# Patient Record
Sex: FEMALE | Race: WHITE | NOT HISPANIC OR LATINO | URBAN - METROPOLITAN AREA
[De-identification: names, ages, dates, MRNs, and addresses within clinical notes are randomized per-mention and may not be internally consistent; named-entity substitution may affect disease eponyms.]

---

## 2023-02-23 ENCOUNTER — APPOINTMENT (OUTPATIENT)
Dept: CT IMAGING | Facility: HOSPITAL | Age: 78
DRG: 884 | End: 2023-02-23
Payer: MEDICARE

## 2023-02-23 ENCOUNTER — HOSPITAL ENCOUNTER (INPATIENT)
Facility: HOSPITAL | Age: 78
LOS: 22 days | Discharge: NURSING, REHAB OR LONG-TERM CARE | DRG: 884 | End: 2023-03-17
Attending: INTERNAL MEDICINE | Admitting: INTERNAL MEDICINE
Payer: MEDICARE

## 2023-02-23 ENCOUNTER — APPOINTMENT (OUTPATIENT)
Dept: GENERAL RADIOLOGY | Facility: HOSPITAL | Age: 78
DRG: 884 | End: 2023-02-23
Payer: MEDICARE

## 2023-02-23 DIAGNOSIS — R41.841 COGNITIVE COMMUNICATION DEFICIT: Primary | ICD-10-CM

## 2023-02-23 DIAGNOSIS — S06.5XAA SUBDURAL HEMATOMA: ICD-10-CM

## 2023-02-23 DIAGNOSIS — R29.6 FREQUENT FALLS: ICD-10-CM

## 2023-02-23 DIAGNOSIS — I63.9 ACUTE ISCHEMIC STROKE: ICD-10-CM

## 2023-02-23 DIAGNOSIS — S62.102A CLOSED FRACTURE OF LEFT WRIST, INITIAL ENCOUNTER: ICD-10-CM

## 2023-02-23 DIAGNOSIS — G93.40 ENCEPHALOPATHY ACUTE: ICD-10-CM

## 2023-02-23 PROBLEM — C85.90 LYMPHOMA: Chronic | Status: ACTIVE | Noted: 2023-02-23

## 2023-02-23 PROBLEM — E03.9 HYPOTHYROIDISM: Status: ACTIVE | Noted: 2023-02-23

## 2023-02-23 PROBLEM — I10 HYPERTENSION: Chronic | Status: ACTIVE | Noted: 2023-02-23

## 2023-02-23 PROBLEM — F41.9 ANXIETY AND DEPRESSION: Status: ACTIVE | Noted: 2023-02-23

## 2023-02-23 PROBLEM — J44.9 COPD (CHRONIC OBSTRUCTIVE PULMONARY DISEASE): Status: ACTIVE | Noted: 2023-02-23

## 2023-02-23 PROBLEM — F19.90 ILLICIT DRUG USE: Status: ACTIVE | Noted: 2023-02-23

## 2023-02-23 PROBLEM — F41.9 ANXIETY AND DEPRESSION: Chronic | Status: ACTIVE | Noted: 2023-02-23

## 2023-02-23 PROBLEM — J44.9 COPD (CHRONIC OBSTRUCTIVE PULMONARY DISEASE): Chronic | Status: ACTIVE | Noted: 2023-02-23

## 2023-02-23 PROBLEM — F32.A ANXIETY AND DEPRESSION: Status: ACTIVE | Noted: 2023-02-23

## 2023-02-23 PROBLEM — M19.90 ARTHRITIS: Status: ACTIVE | Noted: 2023-02-23

## 2023-02-23 PROBLEM — C85.90 LYMPHOMA: Status: ACTIVE | Noted: 2023-02-23

## 2023-02-23 PROBLEM — M19.90 ARTHRITIS: Chronic | Status: ACTIVE | Noted: 2023-02-23

## 2023-02-23 PROBLEM — I10 HYPERTENSION: Status: ACTIVE | Noted: 2023-02-23

## 2023-02-23 PROBLEM — E03.9 HYPOTHYROIDISM: Chronic | Status: ACTIVE | Noted: 2023-02-23

## 2023-02-23 PROBLEM — F32.A ANXIETY AND DEPRESSION: Chronic | Status: ACTIVE | Noted: 2023-02-23

## 2023-02-23 LAB
ALBUMIN SERPL-MCNC: 4.2 G/DL (ref 3.5–5.2)
ALBUMIN/GLOB SERPL: 1.4 G/DL
ALP SERPL-CCNC: 70 U/L (ref 39–117)
ALT SERPL W P-5'-P-CCNC: 18 U/L (ref 1–33)
AMMONIA BLD-SCNC: 14 UMOL/L (ref 11–51)
AMPHET+METHAMPHET UR QL: NEGATIVE
AMPHETAMINES UR QL: NEGATIVE
ANION GAP SERPL CALCULATED.3IONS-SCNC: 11 MMOL/L (ref 5–15)
AST SERPL-CCNC: 23 U/L (ref 1–32)
BARBITURATES UR QL SCN: NEGATIVE
BENZODIAZ UR QL SCN: POSITIVE
BILIRUB SERPL-MCNC: 0.3 MG/DL (ref 0–1.2)
BUN SERPL-MCNC: 6 MG/DL (ref 8–23)
BUN/CREAT SERPL: 9.8 (ref 7–25)
BUPRENORPHINE SERPL-MCNC: NEGATIVE NG/ML
CALCIUM SPEC-SCNC: 9 MG/DL (ref 8.6–10.5)
CANNABINOIDS SERPL QL: NEGATIVE
CHLORIDE SERPL-SCNC: 101 MMOL/L (ref 98–107)
CO2 SERPL-SCNC: 26 MMOL/L (ref 22–29)
COCAINE UR QL: NEGATIVE
CREAT SERPL-MCNC: 0.61 MG/DL (ref 0.57–1)
D-LACTATE SERPL-SCNC: 2 MMOL/L (ref 0.5–2)
DEPRECATED RDW RBC AUTO: 47.5 FL (ref 37–54)
EGFRCR SERPLBLD CKD-EPI 2021: 93.4 ML/MIN/1.73
ERYTHROCYTE [DISTWIDTH] IN BLOOD BY AUTOMATED COUNT: 13.9 % (ref 12.3–15.4)
ETHANOL BLD-MCNC: <10 MG/DL (ref 0–10)
GLOBULIN UR ELPH-MCNC: 3 GM/DL
GLUCOSE BLDC GLUCOMTR-MCNC: 108 MG/DL (ref 70–130)
GLUCOSE BLDC GLUCOMTR-MCNC: 141 MG/DL (ref 70–130)
GLUCOSE SERPL-MCNC: 126 MG/DL (ref 65–99)
HCT VFR BLD AUTO: 37.8 % (ref 34–46.6)
HGB BLD-MCNC: 12.5 G/DL (ref 12–15.9)
MCH RBC QN AUTO: 30.7 PG (ref 26.6–33)
MCHC RBC AUTO-ENTMCNC: 33.1 G/DL (ref 31.5–35.7)
MCV RBC AUTO: 92.9 FL (ref 79–97)
METHADONE UR QL SCN: NEGATIVE
OPIATES UR QL: NEGATIVE
OXYCODONE UR QL SCN: POSITIVE
PCP UR QL SCN: NEGATIVE
PLATELET # BLD AUTO: 263 10*3/MM3 (ref 140–450)
PMV BLD AUTO: 10.1 FL (ref 6–12)
POTASSIUM SERPL-SCNC: 4.4 MMOL/L (ref 3.5–5.2)
PROCALCITONIN SERPL-MCNC: 0.02 NG/ML (ref 0–0.25)
PROPOXYPH UR QL: NEGATIVE
PROT SERPL-MCNC: 7.2 G/DL (ref 6–8.5)
RBC # BLD AUTO: 4.07 10*6/MM3 (ref 3.77–5.28)
SODIUM SERPL-SCNC: 138 MMOL/L (ref 136–145)
T4 FREE SERPL-MCNC: 1.19 NG/DL (ref 0.93–1.7)
TRICYCLICS UR QL SCN: POSITIVE
TSH SERPL DL<=0.05 MIU/L-ACNC: 2.57 UIU/ML (ref 0.27–4.2)
WBC NRBC COR # BLD: 11.18 10*3/MM3 (ref 3.4–10.8)

## 2023-02-23 PROCEDURE — 4A03X5D MEASUREMENT OF ARTERIAL FLOW, INTRACRANIAL, EXTERNAL APPROACH: ICD-10-PCS | Performed by: RADIOLOGY

## 2023-02-23 PROCEDURE — 25010000002 LORAZEPAM PER 2 MG: Performed by: INTERNAL MEDICINE

## 2023-02-23 PROCEDURE — 84145 PROCALCITONIN (PCT): CPT | Performed by: NURSE PRACTITIONER

## 2023-02-23 PROCEDURE — 84439 ASSAY OF FREE THYROXINE: CPT | Performed by: NURSE PRACTITIONER

## 2023-02-23 PROCEDURE — 70498 CT ANGIOGRAPHY NECK: CPT

## 2023-02-23 PROCEDURE — 99222 1ST HOSP IP/OBS MODERATE 55: CPT | Performed by: NURSE PRACTITIONER

## 2023-02-23 PROCEDURE — 0042T HC CT CEREBRAL PERFUSION W/WO CONTRAST: CPT

## 2023-02-23 PROCEDURE — 81003 URINALYSIS AUTO W/O SCOPE: CPT | Performed by: NURSE PRACTITIONER

## 2023-02-23 PROCEDURE — 71045 X-RAY EXAM CHEST 1 VIEW: CPT

## 2023-02-23 PROCEDURE — 70450 CT HEAD/BRAIN W/O DYE: CPT

## 2023-02-23 PROCEDURE — 25010000002 ACYCLOVIR PER 5 MG: Performed by: NURSE PRACTITIONER

## 2023-02-23 PROCEDURE — 82140 ASSAY OF AMMONIA: CPT | Performed by: NURSE PRACTITIONER

## 2023-02-23 PROCEDURE — 80053 COMPREHEN METABOLIC PANEL: CPT | Performed by: NURSE PRACTITIONER

## 2023-02-23 PROCEDURE — 87040 BLOOD CULTURE FOR BACTERIA: CPT | Performed by: NURSE PRACTITIONER

## 2023-02-23 PROCEDURE — 99291 CRITICAL CARE FIRST HOUR: CPT | Performed by: INTERNAL MEDICINE

## 2023-02-23 PROCEDURE — 73110 X-RAY EXAM OF WRIST: CPT

## 2023-02-23 PROCEDURE — 25010000002 ZIPRASIDONE MESYLATE PER 10 MG: Performed by: NURSE PRACTITIONER

## 2023-02-23 PROCEDURE — 70496 CT ANGIOGRAPHY HEAD: CPT

## 2023-02-23 PROCEDURE — 82077 ASSAY SPEC XCP UR&BREATH IA: CPT | Performed by: NURSE PRACTITIONER

## 2023-02-23 PROCEDURE — 85027 COMPLETE CBC AUTOMATED: CPT | Performed by: NURSE PRACTITIONER

## 2023-02-23 PROCEDURE — 84443 ASSAY THYROID STIM HORMONE: CPT | Performed by: NURSE PRACTITIONER

## 2023-02-23 PROCEDURE — 82962 GLUCOSE BLOOD TEST: CPT

## 2023-02-23 PROCEDURE — 83605 ASSAY OF LACTIC ACID: CPT | Performed by: NURSE PRACTITIONER

## 2023-02-23 PROCEDURE — 25010000002 AMPICILLIN PER 500 MG: Performed by: NURSE PRACTITIONER

## 2023-02-23 PROCEDURE — 80306 DRUG TEST PRSMV INSTRMNT: CPT | Performed by: NURSE PRACTITIONER

## 2023-02-23 PROCEDURE — 25010000002 VANCOMYCIN 10 G RECONSTITUTED SOLUTION: Performed by: NURSE PRACTITIONER

## 2023-02-23 PROCEDURE — 25010000002 DEXAMETHASONE SODIUM PHOSPHATE 100 MG/10ML SOLUTION: Performed by: NURSE PRACTITIONER

## 2023-02-23 PROCEDURE — 25510000001 IOPAMIDOL PER 1 ML: Performed by: INTERNAL MEDICINE

## 2023-02-23 PROCEDURE — 25010000002 HALOPERIDOL LACTATE PER 5 MG

## 2023-02-23 PROCEDURE — 25010000002 CEFTRIAXONE PER 250 MG: Performed by: NURSE PRACTITIONER

## 2023-02-23 PROCEDURE — 25010000002 DIAZEPAM PER 5 MG: Performed by: INTERNAL MEDICINE

## 2023-02-23 PROCEDURE — 25010000002 THIAMINE PER 100 MG: Performed by: INTERNAL MEDICINE

## 2023-02-23 PROCEDURE — 25010000002 DIAZEPAM PER 5 MG: Performed by: NURSE PRACTITIONER

## 2023-02-23 RX ORDER — HALOPERIDOL 5 MG/ML
2 INJECTION INTRAMUSCULAR ONCE
Status: COMPLETED | OUTPATIENT
Start: 2023-02-23 | End: 2023-02-23

## 2023-02-23 RX ORDER — SODIUM CHLORIDE 0.9 % (FLUSH) 0.9 %
10 SYRINGE (ML) INJECTION EVERY 12 HOURS SCHEDULED
Status: DISCONTINUED | OUTPATIENT
Start: 2023-02-23 | End: 2023-02-25

## 2023-02-23 RX ORDER — LORAZEPAM 2 MG/ML
2 INJECTION INTRAMUSCULAR
Status: DISCONTINUED | OUTPATIENT
Start: 2023-02-23 | End: 2023-02-28

## 2023-02-23 RX ORDER — WATER 1000 ML/1000ML
INJECTION, SOLUTION INTRAVENOUS
Status: COMPLETED
Start: 2023-02-23 | End: 2023-02-23

## 2023-02-23 RX ORDER — DIAZEPAM 5 MG/ML
10 INJECTION, SOLUTION INTRAMUSCULAR; INTRAVENOUS ONCE
Status: COMPLETED | OUTPATIENT
Start: 2023-02-23 | End: 2023-02-23

## 2023-02-23 RX ORDER — DIAZEPAM 5 MG/ML
5 INJECTION, SOLUTION INTRAMUSCULAR; INTRAVENOUS ONCE
Status: COMPLETED | OUTPATIENT
Start: 2023-02-23 | End: 2023-02-23

## 2023-02-23 RX ORDER — LORAZEPAM 2 MG/ML
1 INJECTION INTRAMUSCULAR
Status: DISCONTINUED | OUTPATIENT
Start: 2023-02-23 | End: 2023-02-28

## 2023-02-23 RX ORDER — LEVOTHYROXINE SODIUM 0.1 MG/1
100 TABLET ORAL
Status: DISCONTINUED | OUTPATIENT
Start: 2023-02-24 | End: 2023-03-17 | Stop reason: HOSPADM

## 2023-02-23 RX ORDER — ASPIRIN 300 MG/1
300 SUPPOSITORY RECTAL DAILY
Status: DISCONTINUED | OUTPATIENT
Start: 2023-02-23 | End: 2023-03-17 | Stop reason: HOSPADM

## 2023-02-23 RX ORDER — ATORVASTATIN CALCIUM 40 MG/1
80 TABLET, FILM COATED ORAL NIGHTLY
Status: DISCONTINUED | OUTPATIENT
Start: 2023-02-23 | End: 2023-03-17 | Stop reason: HOSPADM

## 2023-02-23 RX ORDER — ZIPRASIDONE MESYLATE 20 MG/ML
10 INJECTION, POWDER, LYOPHILIZED, FOR SOLUTION INTRAMUSCULAR ONCE AS NEEDED
Status: COMPLETED | OUTPATIENT
Start: 2023-02-23 | End: 2023-02-23

## 2023-02-23 RX ORDER — SODIUM CHLORIDE 0.9 % (FLUSH) 0.9 %
10 SYRINGE (ML) INJECTION AS NEEDED
Status: DISCONTINUED | OUTPATIENT
Start: 2023-02-23 | End: 2023-02-25

## 2023-02-23 RX ORDER — VANCOMYCIN HYDROCHLORIDE 1 G/200ML
1000 INJECTION, SOLUTION INTRAVENOUS EVERY 12 HOURS
Status: DISCONTINUED | OUTPATIENT
Start: 2023-02-24 | End: 2023-02-25

## 2023-02-23 RX ORDER — DEXMEDETOMIDINE HYDROCHLORIDE 4 UG/ML
.2-1.5 INJECTION, SOLUTION INTRAVENOUS
Status: DISCONTINUED | OUTPATIENT
Start: 2023-02-23 | End: 2023-02-25

## 2023-02-23 RX ORDER — LORAZEPAM 1 MG/1
2 TABLET ORAL
Status: DISCONTINUED | OUTPATIENT
Start: 2023-02-23 | End: 2023-02-28

## 2023-02-23 RX ORDER — THIAMINE HYDROCHLORIDE 100 MG/ML
100 INJECTION, SOLUTION INTRAMUSCULAR; INTRAVENOUS DAILY
Status: DISCONTINUED | OUTPATIENT
Start: 2023-02-23 | End: 2023-02-23

## 2023-02-23 RX ORDER — PANTOPRAZOLE SODIUM 40 MG/10ML
40 INJECTION, POWDER, LYOPHILIZED, FOR SOLUTION INTRAVENOUS
Status: DISCONTINUED | OUTPATIENT
Start: 2023-02-24 | End: 2023-02-27

## 2023-02-23 RX ORDER — LORAZEPAM 2 MG/ML
0.5 INJECTION INTRAMUSCULAR EVERY 6 HOURS
Status: DISCONTINUED | OUTPATIENT
Start: 2023-02-23 | End: 2023-02-23

## 2023-02-23 RX ORDER — LORAZEPAM 2 MG/ML
0.5 INJECTION INTRAMUSCULAR
Status: DISCONTINUED | OUTPATIENT
Start: 2023-02-23 | End: 2023-02-28

## 2023-02-23 RX ORDER — ASPIRIN 81 MG/1
81 TABLET, CHEWABLE ORAL DAILY
Status: DISCONTINUED | OUTPATIENT
Start: 2023-02-23 | End: 2023-03-17 | Stop reason: HOSPADM

## 2023-02-23 RX ORDER — LORAZEPAM 2 MG/ML
0.5 INJECTION INTRAMUSCULAR EVERY 8 HOURS
Status: DISCONTINUED | OUTPATIENT
Start: 2023-02-24 | End: 2023-02-23

## 2023-02-23 RX ORDER — LORAZEPAM 1 MG/1
1 TABLET ORAL
Status: DISCONTINUED | OUTPATIENT
Start: 2023-02-23 | End: 2023-02-28

## 2023-02-23 RX ORDER — LORAZEPAM 2 MG/ML
0.5 INJECTION INTRAMUSCULAR EVERY 8 HOURS
Status: DISCONTINUED | OUTPATIENT
Start: 2023-02-24 | End: 2023-02-25

## 2023-02-23 RX ORDER — LORAZEPAM 2 MG/ML
2 INJECTION INTRAMUSCULAR EVERY 6 HOURS
Status: DISCONTINUED | OUTPATIENT
Start: 2023-02-24 | End: 2023-02-24

## 2023-02-23 RX ORDER — LORAZEPAM 0.5 MG/1
0.5 TABLET ORAL
Status: DISCONTINUED | OUTPATIENT
Start: 2023-02-23 | End: 2023-02-28

## 2023-02-23 RX ORDER — SODIUM CHLORIDE 9 MG/ML
40 INJECTION, SOLUTION INTRAVENOUS AS NEEDED
Status: DISCONTINUED | OUTPATIENT
Start: 2023-02-23 | End: 2023-03-17 | Stop reason: HOSPADM

## 2023-02-23 RX ADMIN — CEFTRIAXONE 2 G: 2 INJECTION, POWDER, FOR SOLUTION INTRAMUSCULAR; INTRAVENOUS at 22:26

## 2023-02-23 RX ADMIN — AMPICILLIN 2 G: 2 INJECTION, POWDER, FOR SOLUTION INTRAVENOUS at 22:26

## 2023-02-23 RX ADMIN — LORAZEPAM 2 MG: 2 INJECTION INTRAMUSCULAR; INTRAVENOUS at 22:20

## 2023-02-23 RX ADMIN — LORAZEPAM 0.5 MG: 2 INJECTION INTRAMUSCULAR; INTRAVENOUS at 18:03

## 2023-02-23 RX ADMIN — ZIPRASIDONE MESYLATE 10 MG: 20 INJECTION, POWDER, LYOPHILIZED, FOR SOLUTION INTRAMUSCULAR at 18:10

## 2023-02-23 RX ADMIN — VANCOMYCIN HYDROCHLORIDE 2000 MG: 10 INJECTION, POWDER, LYOPHILIZED, FOR SOLUTION INTRAVENOUS at 22:24

## 2023-02-23 RX ADMIN — ASPIRIN 300 MG: 300 SUPPOSITORY RECTAL at 20:56

## 2023-02-23 RX ADMIN — DEXMEDETOMIDINE HYDROCHLORIDE 1.5 MCG/KG/HR: 400 INJECTION INTRAVENOUS at 20:22

## 2023-02-23 RX ADMIN — LORAZEPAM 1 MG: 2 INJECTION INTRAMUSCULAR; INTRAVENOUS at 21:04

## 2023-02-23 RX ADMIN — WATER 10 ML: 1 INJECTION INTRAMUSCULAR; INTRAVENOUS; SUBCUTANEOUS at 18:11

## 2023-02-23 RX ADMIN — DIAZEPAM 5 MG: 5 INJECTION, SOLUTION INTRAMUSCULAR; INTRAVENOUS at 17:44

## 2023-02-23 RX ADMIN — HALOPERIDOL LACTATE 2 MG: 5 INJECTION, SOLUTION INTRAMUSCULAR at 23:02

## 2023-02-23 RX ADMIN — LORAZEPAM 1 MG: 2 INJECTION INTRAMUSCULAR; INTRAVENOUS at 20:55

## 2023-02-23 RX ADMIN — IOPAMIDOL 200 ML: 755 INJECTION, SOLUTION INTRAVENOUS at 15:16

## 2023-02-23 RX ADMIN — DIAZEPAM 10 MG: 5 INJECTION, SOLUTION INTRAMUSCULAR; INTRAVENOUS at 19:00

## 2023-02-23 RX ADMIN — ACYCLOVIR SODIUM 960 MG: 50 INJECTION, SOLUTION INTRAVENOUS at 22:11

## 2023-02-23 RX ADMIN — LORAZEPAM 2 MG: 2 INJECTION INTRAMUSCULAR; INTRAVENOUS at 20:15

## 2023-02-23 RX ADMIN — THIAMINE HYDROCHLORIDE 500 MG: 100 INJECTION, SOLUTION INTRAMUSCULAR; INTRAVENOUS at 21:04

## 2023-02-23 RX ADMIN — DIAZEPAM 10 MG: 5 INJECTION, SOLUTION INTRAMUSCULAR; INTRAVENOUS at 18:20

## 2023-02-23 RX ADMIN — DEXAMETHASONE SODIUM PHOSPHATE 10 MG: 10 INJECTION, SOLUTION INTRAMUSCULAR; INTRAVENOUS at 20:57

## 2023-02-24 ENCOUNTER — APPOINTMENT (OUTPATIENT)
Dept: CARDIOLOGY | Facility: HOSPITAL | Age: 78
DRG: 884 | End: 2023-02-24
Payer: MEDICARE

## 2023-02-24 ENCOUNTER — APPOINTMENT (OUTPATIENT)
Dept: NEUROLOGY | Facility: HOSPITAL | Age: 78
DRG: 884 | End: 2023-02-24
Payer: MEDICARE

## 2023-02-24 ENCOUNTER — APPOINTMENT (OUTPATIENT)
Dept: GENERAL RADIOLOGY | Facility: HOSPITAL | Age: 78
DRG: 884 | End: 2023-02-24
Payer: MEDICARE

## 2023-02-24 LAB
ANION GAP SERPL CALCULATED.3IONS-SCNC: 12 MMOL/L (ref 5–15)
ASCENDING AORTA: 3.2 CM
BACTERIA UR QL AUTO: ABNORMAL /HPF
BH CV ECHO MEAS - AO MAX PG: 6.3 MMHG
BH CV ECHO MEAS - AO MEAN PG: 2.9 MMHG
BH CV ECHO MEAS - AO ROOT DIAM: 2.8 CM
BH CV ECHO MEAS - AO V2 MAX: 126 CM/SEC
BH CV ECHO MEAS - AO V2 VTI: 31.9 CM
BH CV ECHO MEAS - AVA(I,D): 2.19 CM2
BH CV ECHO MEAS - EDV(CUBED): 52.6 ML
BH CV ECHO MEAS - EDV(MOD-SP2): 53.6 ML
BH CV ECHO MEAS - EDV(MOD-SP4): 61.4 ML
BH CV ECHO MEAS - EF(MOD-BP): 67.2 %
BH CV ECHO MEAS - EF(MOD-SP2): 60.3 %
BH CV ECHO MEAS - EF(MOD-SP4): 73.6 %
BH CV ECHO MEAS - ESV(CUBED): 8.6 ML
BH CV ECHO MEAS - ESV(MOD-SP2): 21.3 ML
BH CV ECHO MEAS - ESV(MOD-SP4): 16.2 ML
BH CV ECHO MEAS - FS: 45.4 %
BH CV ECHO MEAS - IVS/LVPW: 1.1 CM
BH CV ECHO MEAS - IVSD: 1.1 CM
BH CV ECHO MEAS - LA DIMENSION: 3.8 CM
BH CV ECHO MEAS - LAT PEAK E' VEL: 10.4 CM/SEC
BH CV ECHO MEAS - LV DIASTOLIC VOL/BSA (35-75): 28.5 CM2
BH CV ECHO MEAS - LV MASS(C)D: 124.3 GRAMS
BH CV ECHO MEAS - LV MAX PG: 1.87 MMHG
BH CV ECHO MEAS - LV MEAN PG: 1 MMHG
BH CV ECHO MEAS - LV SYSTOLIC VOL/BSA (12-30): 7.5 CM2
BH CV ECHO MEAS - LV V1 MAX: 68.4 CM/SEC
BH CV ECHO MEAS - LV V1 VTI: 20.9 CM
BH CV ECHO MEAS - LVIDD: 3.7 CM
BH CV ECHO MEAS - LVIDS: 2.05 CM
BH CV ECHO MEAS - LVOT AREA: 3.3 CM2
BH CV ECHO MEAS - LVOT DIAM: 2.06 CM
BH CV ECHO MEAS - LVPWD: 1.01 CM
BH CV ECHO MEAS - MED PEAK E' VEL: 9.4 CM/SEC
BH CV ECHO MEAS - MV A MAX VEL: 112.7 CM/SEC
BH CV ECHO MEAS - MV DEC SLOPE: 284.5 CM/SEC2
BH CV ECHO MEAS - MV DEC TIME: 0.31 MSEC
BH CV ECHO MEAS - MV E MAX VEL: 90.8 CM/SEC
BH CV ECHO MEAS - MV E/A: 0.81
BH CV ECHO MEAS - MV MAX PG: 5 MMHG
BH CV ECHO MEAS - MV MEAN PG: 1.3 MMHG
BH CV ECHO MEAS - MV V2 VTI: 46.5 CM
BH CV ECHO MEAS - MVA(VTI): 1.5 CM2
BH CV ECHO MEAS - PA ACC TIME: 0.07 SEC
BH CV ECHO MEAS - PA PR(ACCEL): 48.9 MMHG
BH CV ECHO MEAS - PA V2 MAX: 91.8 CM/SEC
BH CV ECHO MEAS - PI END-D VEL: 121.6 CM/SEC
BH CV ECHO MEAS - RAP SYSTOLE: 3 MMHG
BH CV ECHO MEAS - RVSP: 20.9 MMHG
BH CV ECHO MEAS - SI(MOD-SP2): 15 ML/M2
BH CV ECHO MEAS - SI(MOD-SP4): 21 ML/M2
BH CV ECHO MEAS - SV(LVOT): 69.7 ML
BH CV ECHO MEAS - SV(MOD-SP2): 32.3 ML
BH CV ECHO MEAS - SV(MOD-SP4): 45.2 ML
BH CV ECHO MEAS - TR MAX PG: 17.9 MMHG
BH CV ECHO MEAS - TR MAX VEL: 210.5 CM/SEC
BH CV ECHO MEASUREMENTS AVERAGE E/E' RATIO: 9.17
BH CV ECHO SHUNT ASSESSMENT PERFORMED (HIDDEN SCRIPTING): 1
BH CV VAS BP LEFT ARM: NORMAL MMHG
BH CV XLRA - RV BASE: 3 CM
BH CV XLRA - RV LENGTH: 8.1 CM
BH CV XLRA - RV MID: 2.34 CM
BH CV XLRA - TDI S': 12.4 CM/SEC
BILIRUB UR QL STRIP: NEGATIVE
BILIRUB UR QL STRIP: NEGATIVE
BUN SERPL-MCNC: 9 MG/DL (ref 8–23)
BUN/CREAT SERPL: 12 (ref 7–25)
CALCIUM SPEC-SCNC: 8.3 MG/DL (ref 8.6–10.5)
CHLORIDE SERPL-SCNC: 102 MMOL/L (ref 98–107)
CHOLEST SERPL-MCNC: 229 MG/DL (ref 0–200)
CLARITY UR: ABNORMAL
CLARITY UR: CLEAR
CO2 SERPL-SCNC: 25 MMOL/L (ref 22–29)
COLOR UR: YELLOW
COLOR UR: YELLOW
CREAT SERPL-MCNC: 0.75 MG/DL (ref 0.57–1)
DEPRECATED RDW RBC AUTO: 49.4 FL (ref 37–54)
EGFRCR SERPLBLD CKD-EPI 2021: 83.1 ML/MIN/1.73
ERYTHROCYTE [DISTWIDTH] IN BLOOD BY AUTOMATED COUNT: 14 % (ref 12.3–15.4)
GLUCOSE BLDC GLUCOMTR-MCNC: 124 MG/DL (ref 70–130)
GLUCOSE BLDC GLUCOMTR-MCNC: 135 MG/DL (ref 70–130)
GLUCOSE BLDC GLUCOMTR-MCNC: 160 MG/DL (ref 70–130)
GLUCOSE BLDC GLUCOMTR-MCNC: 96 MG/DL (ref 70–130)
GLUCOSE SERPL-MCNC: 173 MG/DL (ref 65–99)
GLUCOSE UR STRIP-MCNC: NEGATIVE MG/DL
GLUCOSE UR STRIP-MCNC: NEGATIVE MG/DL
HBA1C MFR BLD: 5.4 % (ref 4.8–5.6)
HCT VFR BLD AUTO: 43.1 % (ref 34–46.6)
HDLC SERPL-MCNC: 63 MG/DL (ref 40–60)
HGB BLD-MCNC: 13.3 G/DL (ref 12–15.9)
HGB UR QL STRIP.AUTO: NEGATIVE
HGB UR QL STRIP.AUTO: NEGATIVE
HYALINE CASTS UR QL AUTO: ABNORMAL /LPF
IVRT: 113 MSEC
KETONES UR QL STRIP: NEGATIVE
KETONES UR QL STRIP: NEGATIVE
LDLC SERPL CALC-MCNC: 150 MG/DL (ref 0–100)
LDLC/HDLC SERPL: 2.35 {RATIO}
LEFT ATRIUM VOLUME INDEX: 24.4 ML/M2
LEUKOCYTE ESTERASE UR QL STRIP.AUTO: NEGATIVE
LEUKOCYTE ESTERASE UR QL STRIP.AUTO: NEGATIVE
MAGNESIUM SERPL-MCNC: 1.5 MG/DL (ref 1.6–2.4)
MAXIMAL PREDICTED HEART RATE: 145 BPM
MCH RBC QN AUTO: 29.7 PG (ref 26.6–33)
MCHC RBC AUTO-ENTMCNC: 30.9 G/DL (ref 31.5–35.7)
MCV RBC AUTO: 96.2 FL (ref 79–97)
NITRITE UR QL STRIP: NEGATIVE
NITRITE UR QL STRIP: NEGATIVE
PH UR STRIP.AUTO: 6 [PH] (ref 5–8)
PH UR STRIP.AUTO: <=5 [PH] (ref 5–8)
PHOSPHATE SERPL-MCNC: 3 MG/DL (ref 2.5–4.5)
PLATELET # BLD AUTO: 201 10*3/MM3 (ref 140–450)
PMV BLD AUTO: 9.7 FL (ref 6–12)
POTASSIUM SERPL-SCNC: 3.9 MMOL/L (ref 3.5–5.2)
PROT UR QL STRIP: ABNORMAL
PROT UR QL STRIP: NEGATIVE
RBC # BLD AUTO: 4.48 10*6/MM3 (ref 3.77–5.28)
RBC # UR STRIP: ABNORMAL /HPF
REF LAB TEST METHOD: ABNORMAL
SODIUM SERPL-SCNC: 139 MMOL/L (ref 136–145)
SP GR UR STRIP: 1.05 (ref 1–1.03)
SP GR UR STRIP: 1.07 (ref 1–1.03)
SQUAMOUS #/AREA URNS HPF: ABNORMAL /HPF
STRESS TARGET HR: 123 BPM
TRIGL SERPL-MCNC: 90 MG/DL (ref 0–150)
UROBILINOGEN UR QL STRIP: ABNORMAL
UROBILINOGEN UR QL STRIP: ABNORMAL
VLDLC SERPL-MCNC: 16 MG/DL (ref 5–40)
WBC # UR STRIP: ABNORMAL /HPF
WBC NRBC COR # BLD: 9.7 10*3/MM3 (ref 3.4–10.8)

## 2023-02-24 PROCEDURE — 80048 BASIC METABOLIC PNL TOTAL CA: CPT | Performed by: NURSE PRACTITIONER

## 2023-02-24 PROCEDURE — 99233 SBSQ HOSP IP/OBS HIGH 50: CPT | Performed by: PSYCHIATRY & NEUROLOGY

## 2023-02-24 PROCEDURE — 25010000002 DEXAMETHASONE SODIUM PHOSPHATE 100 MG/10ML SOLUTION: Performed by: NURSE PRACTITIONER

## 2023-02-24 PROCEDURE — 99291 CRITICAL CARE FIRST HOUR: CPT | Performed by: INTERNAL MEDICINE

## 2023-02-24 PROCEDURE — 84100 ASSAY OF PHOSPHORUS: CPT | Performed by: NURSE PRACTITIONER

## 2023-02-24 PROCEDURE — 25010000002 AMPICILLIN PER 500 MG: Performed by: NURSE PRACTITIONER

## 2023-02-24 PROCEDURE — 97165 OT EVAL LOW COMPLEX 30 MIN: CPT

## 2023-02-24 PROCEDURE — 25010000002 ACYCLOVIR PER 5 MG: Performed by: NURSE PRACTITIONER

## 2023-02-24 PROCEDURE — 25010000002 VANCOMYCIN PER 500 MG: Performed by: NURSE PRACTITIONER

## 2023-02-24 PROCEDURE — 80061 LIPID PANEL: CPT | Performed by: NURSE PRACTITIONER

## 2023-02-24 PROCEDURE — 25010000002 THIAMINE PER 100 MG: Performed by: INTERNAL MEDICINE

## 2023-02-24 PROCEDURE — 83735 ASSAY OF MAGNESIUM: CPT | Performed by: NURSE PRACTITIONER

## 2023-02-24 PROCEDURE — C1894 INTRO/SHEATH, NON-LASER: HCPCS

## 2023-02-24 PROCEDURE — 97161 PT EVAL LOW COMPLEX 20 MIN: CPT

## 2023-02-24 PROCEDURE — 25010000002 CEFTRIAXONE PER 250 MG: Performed by: NURSE PRACTITIONER

## 2023-02-24 PROCEDURE — 93306 TTE W/DOPPLER COMPLETE: CPT | Performed by: INTERNAL MEDICINE

## 2023-02-24 PROCEDURE — 92610 EVALUATE SWALLOWING FUNCTION: CPT

## 2023-02-24 PROCEDURE — C1751 CATH, INF, PER/CENT/MIDLINE: HCPCS

## 2023-02-24 PROCEDURE — 85027 COMPLETE CBC AUTOMATED: CPT | Performed by: NURSE PRACTITIONER

## 2023-02-24 PROCEDURE — 81001 URINALYSIS AUTO W/SCOPE: CPT | Performed by: PSYCHIATRY & NEUROLOGY

## 2023-02-24 PROCEDURE — 82962 GLUCOSE BLOOD TEST: CPT

## 2023-02-24 PROCEDURE — 95819 EEG AWAKE AND ASLEEP: CPT

## 2023-02-24 PROCEDURE — 02HV33Z INSERTION OF INFUSION DEVICE INTO SUPERIOR VENA CAVA, PERCUTANEOUS APPROACH: ICD-10-PCS | Performed by: INTERNAL MEDICINE

## 2023-02-24 PROCEDURE — 83036 HEMOGLOBIN GLYCOSYLATED A1C: CPT | Performed by: NURSE PRACTITIONER

## 2023-02-24 PROCEDURE — 92523 SPEECH SOUND LANG COMPREHEN: CPT

## 2023-02-24 PROCEDURE — 71045 X-RAY EXAM CHEST 1 VIEW: CPT

## 2023-02-24 PROCEDURE — 93306 TTE W/DOPPLER COMPLETE: CPT

## 2023-02-24 RX ORDER — SODIUM CHLORIDE 0.9 % (FLUSH) 0.9 %
10 SYRINGE (ML) INJECTION EVERY 12 HOURS SCHEDULED
Status: DISCONTINUED | OUTPATIENT
Start: 2023-02-24 | End: 2023-03-17 | Stop reason: HOSPADM

## 2023-02-24 RX ORDER — SODIUM CHLORIDE 0.9 % (FLUSH) 0.9 %
10 SYRINGE (ML) INJECTION AS NEEDED
Status: DISCONTINUED | OUTPATIENT
Start: 2023-02-24 | End: 2023-03-17 | Stop reason: HOSPADM

## 2023-02-24 RX ADMIN — DEXAMETHASONE SODIUM PHOSPHATE 10 MG: 10 INJECTION, SOLUTION INTRAMUSCULAR; INTRAVENOUS at 15:32

## 2023-02-24 RX ADMIN — ACYCLOVIR SODIUM 960 MG: 50 INJECTION, SOLUTION INTRAVENOUS at 21:15

## 2023-02-24 RX ADMIN — THIAMINE HYDROCHLORIDE 500 MG: 100 INJECTION, SOLUTION INTRAMUSCULAR; INTRAVENOUS at 11:16

## 2023-02-24 RX ADMIN — VANCOMYCIN HYDROCHLORIDE 1000 MG: 1 INJECTION, SOLUTION INTRAVENOUS at 20:07

## 2023-02-24 RX ADMIN — AMPICILLIN 2 G: 2 INJECTION, POWDER, FOR SOLUTION INTRAVENOUS at 00:37

## 2023-02-24 RX ADMIN — Medication 10 ML: at 20:13

## 2023-02-24 RX ADMIN — Medication 10 ML: at 13:58

## 2023-02-24 RX ADMIN — AMPICILLIN 2 G: 2 INJECTION, POWDER, FOR SOLUTION INTRAVENOUS at 12:47

## 2023-02-24 RX ADMIN — VANCOMYCIN HYDROCHLORIDE 1000 MG: 1 INJECTION, SOLUTION INTRAVENOUS at 14:39

## 2023-02-24 RX ADMIN — ACYCLOVIR SODIUM 960 MG: 50 INJECTION, SOLUTION INTRAVENOUS at 05:18

## 2023-02-24 RX ADMIN — CEFTRIAXONE 2 G: 2 INJECTION, POWDER, FOR SOLUTION INTRAMUSCULAR; INTRAVENOUS at 20:05

## 2023-02-24 RX ADMIN — PANTOPRAZOLE SODIUM 40 MG: 40 INJECTION, POWDER, FOR SOLUTION INTRAVENOUS at 05:18

## 2023-02-24 RX ADMIN — AMPICILLIN 2 G: 2 INJECTION, POWDER, FOR SOLUTION INTRAVENOUS at 03:11

## 2023-02-24 RX ADMIN — THIAMINE HYDROCHLORIDE 500 MG: 100 INJECTION, SOLUTION INTRAMUSCULAR; INTRAVENOUS at 20:11

## 2023-02-24 RX ADMIN — AMPICILLIN 2 G: 2 INJECTION, POWDER, FOR SOLUTION INTRAVENOUS at 15:51

## 2023-02-24 RX ADMIN — CEFTRIAXONE 2 G: 2 INJECTION, POWDER, FOR SOLUTION INTRAMUSCULAR; INTRAVENOUS at 09:50

## 2023-02-24 RX ADMIN — DEXMEDETOMIDINE 1.5 MCG/KG/HR: 100 INJECTION, SOLUTION, CONCENTRATE INTRAVENOUS at 00:29

## 2023-02-24 RX ADMIN — AMPICILLIN 2 G: 2 INJECTION, POWDER, FOR SOLUTION INTRAVENOUS at 20:05

## 2023-02-24 RX ADMIN — DEXAMETHASONE SODIUM PHOSPHATE 10 MG: 10 INJECTION, SOLUTION INTRAMUSCULAR; INTRAVENOUS at 02:21

## 2023-02-24 RX ADMIN — AMPICILLIN 2 G: 2 INJECTION, POWDER, FOR SOLUTION INTRAVENOUS at 10:28

## 2023-02-24 RX ADMIN — DEXAMETHASONE SODIUM PHOSPHATE 10 MG: 10 INJECTION, SOLUTION INTRAMUSCULAR; INTRAVENOUS at 20:10

## 2023-02-24 RX ADMIN — Medication 10 ML: at 09:56

## 2023-02-24 RX ADMIN — ATORVASTATIN CALCIUM 80 MG: 40 TABLET, FILM COATED ORAL at 20:04

## 2023-02-24 RX ADMIN — ASPIRIN 81 MG CHEWABLE TABLET 81 MG: 81 TABLET CHEWABLE at 09:49

## 2023-02-24 RX ADMIN — DEXAMETHASONE SODIUM PHOSPHATE 10 MG: 10 INJECTION, SOLUTION INTRAMUSCULAR; INTRAVENOUS at 09:14

## 2023-02-24 RX ADMIN — ACYCLOVIR SODIUM 960 MG: 50 INJECTION, SOLUTION INTRAVENOUS at 15:51

## 2023-02-25 ENCOUNTER — APPOINTMENT (OUTPATIENT)
Dept: MRI IMAGING | Facility: HOSPITAL | Age: 78
DRG: 884 | End: 2023-02-25
Payer: MEDICARE

## 2023-02-25 ENCOUNTER — APPOINTMENT (OUTPATIENT)
Dept: GENERAL RADIOLOGY | Facility: HOSPITAL | Age: 78
DRG: 884 | End: 2023-02-25
Payer: MEDICARE

## 2023-02-25 LAB
ANION GAP SERPL CALCULATED.3IONS-SCNC: 10 MMOL/L (ref 5–15)
BASOPHILS # BLD AUTO: 0.01 10*3/MM3 (ref 0–0.2)
BASOPHILS NFR BLD AUTO: 0.1 % (ref 0–1.5)
BUN SERPL-MCNC: 12 MG/DL (ref 8–23)
BUN/CREAT SERPL: 20.7 (ref 7–25)
CALCIUM SPEC-SCNC: 7.8 MG/DL (ref 8.6–10.5)
CHLORIDE SERPL-SCNC: 107 MMOL/L (ref 98–107)
CO2 SERPL-SCNC: 22 MMOL/L (ref 22–29)
CREAT SERPL-MCNC: 0.58 MG/DL (ref 0.57–1)
DEPRECATED RDW RBC AUTO: 50.6 FL (ref 37–54)
EGFRCR SERPLBLD CKD-EPI 2021: 94.5 ML/MIN/1.73
EOSINOPHIL # BLD AUTO: 0 10*3/MM3 (ref 0–0.4)
EOSINOPHIL NFR BLD AUTO: 0 % (ref 0.3–6.2)
ERYTHROCYTE [DISTWIDTH] IN BLOOD BY AUTOMATED COUNT: 14.3 % (ref 12.3–15.4)
GLUCOSE BLDC GLUCOMTR-MCNC: 103 MG/DL (ref 70–130)
GLUCOSE BLDC GLUCOMTR-MCNC: 113 MG/DL (ref 70–130)
GLUCOSE BLDC GLUCOMTR-MCNC: 131 MG/DL (ref 70–130)
GLUCOSE BLDC GLUCOMTR-MCNC: 133 MG/DL (ref 70–130)
GLUCOSE SERPL-MCNC: 132 MG/DL (ref 65–99)
HCT VFR BLD AUTO: 39.1 % (ref 34–46.6)
HGB BLD-MCNC: 12.1 G/DL (ref 12–15.9)
IMM GRANULOCYTES # BLD AUTO: 0.06 10*3/MM3 (ref 0–0.05)
IMM GRANULOCYTES NFR BLD AUTO: 0.4 % (ref 0–0.5)
LYMPHOCYTES # BLD AUTO: 0.59 10*3/MM3 (ref 0.7–3.1)
LYMPHOCYTES NFR BLD AUTO: 4.3 % (ref 19.6–45.3)
MAGNESIUM SERPL-MCNC: 1.4 MG/DL (ref 1.6–2.4)
MCH RBC QN AUTO: 29.7 PG (ref 26.6–33)
MCHC RBC AUTO-ENTMCNC: 30.9 G/DL (ref 31.5–35.7)
MCV RBC AUTO: 96.1 FL (ref 79–97)
MONOCYTES # BLD AUTO: 0.47 10*3/MM3 (ref 0.1–0.9)
MONOCYTES NFR BLD AUTO: 3.5 % (ref 5–12)
NEUTROPHILS NFR BLD AUTO: 12.48 10*3/MM3 (ref 1.7–7)
NEUTROPHILS NFR BLD AUTO: 91.7 % (ref 42.7–76)
NRBC BLD AUTO-RTO: 0 /100 WBC (ref 0–0.2)
PHOSPHATE SERPL-MCNC: 1.8 MG/DL (ref 2.5–4.5)
PHOSPHATE SERPL-MCNC: 1.9 MG/DL (ref 2.5–4.5)
PLATELET # BLD AUTO: 223 10*3/MM3 (ref 140–450)
PMV BLD AUTO: 9.8 FL (ref 6–12)
POTASSIUM SERPL-SCNC: 3.3 MMOL/L (ref 3.5–5.2)
POTASSIUM SERPL-SCNC: 4.8 MMOL/L (ref 3.5–5.2)
RBC # BLD AUTO: 4.07 10*6/MM3 (ref 3.77–5.28)
SODIUM SERPL-SCNC: 139 MMOL/L (ref 136–145)
VANCOMYCIN TROUGH SERPL-MCNC: 20.7 MCG/ML (ref 5–20)
WBC NRBC COR # BLD: 13.61 10*3/MM3 (ref 3.4–10.8)

## 2023-02-25 PROCEDURE — 0 GADOBENATE DIMEGLUMINE 529 MG/ML SOLUTION: Performed by: INTERNAL MEDICINE

## 2023-02-25 PROCEDURE — A9577 INJ MULTIHANCE: HCPCS | Performed by: INTERNAL MEDICINE

## 2023-02-25 PROCEDURE — 84100 ASSAY OF PHOSPHORUS: CPT | Performed by: INTERNAL MEDICINE

## 2023-02-25 PROCEDURE — 84132 ASSAY OF SERUM POTASSIUM: CPT

## 2023-02-25 PROCEDURE — 80202 ASSAY OF VANCOMYCIN: CPT | Performed by: NURSE PRACTITIONER

## 2023-02-25 PROCEDURE — 73100 X-RAY EXAM OF WRIST: CPT

## 2023-02-25 PROCEDURE — 85025 COMPLETE CBC W/AUTO DIFF WBC: CPT | Performed by: INTERNAL MEDICINE

## 2023-02-25 PROCEDURE — 83735 ASSAY OF MAGNESIUM: CPT | Performed by: INTERNAL MEDICINE

## 2023-02-25 PROCEDURE — 25010000002 LORAZEPAM PER 2 MG: Performed by: NURSE PRACTITIONER

## 2023-02-25 PROCEDURE — 84100 ASSAY OF PHOSPHORUS: CPT

## 2023-02-25 PROCEDURE — 25010000002 MAGNESIUM SULFATE 2 GM/50ML SOLUTION

## 2023-02-25 PROCEDURE — 25010000002 AMPICILLIN PER 500 MG: Performed by: NURSE PRACTITIONER

## 2023-02-25 PROCEDURE — 99232 SBSQ HOSP IP/OBS MODERATE 35: CPT | Performed by: INTERNAL MEDICINE

## 2023-02-25 PROCEDURE — 25010000002 CEFTRIAXONE PER 250 MG: Performed by: NURSE PRACTITIONER

## 2023-02-25 PROCEDURE — 25010000002 ACYCLOVIR PER 5 MG: Performed by: NURSE PRACTITIONER

## 2023-02-25 PROCEDURE — 25010000002 DEXAMETHASONE SODIUM PHOSPHATE 100 MG/10ML SOLUTION: Performed by: NURSE PRACTITIONER

## 2023-02-25 PROCEDURE — 80048 BASIC METABOLIC PNL TOTAL CA: CPT | Performed by: INTERNAL MEDICINE

## 2023-02-25 PROCEDURE — 25010000002 THIAMINE PER 100 MG: Performed by: INTERNAL MEDICINE

## 2023-02-25 PROCEDURE — 82962 GLUCOSE BLOOD TEST: CPT

## 2023-02-25 PROCEDURE — 70553 MRI BRAIN STEM W/O & W/DYE: CPT

## 2023-02-25 PROCEDURE — 99233 SBSQ HOSP IP/OBS HIGH 50: CPT | Performed by: PSYCHIATRY & NEUROLOGY

## 2023-02-25 PROCEDURE — 25010000002 VANCOMYCIN PER 500 MG: Performed by: NURSE PRACTITIONER

## 2023-02-25 RX ORDER — MAGNESIUM SULFATE HEPTAHYDRATE 40 MG/ML
2 INJECTION, SOLUTION INTRAVENOUS
Status: COMPLETED | OUTPATIENT
Start: 2023-02-25 | End: 2023-02-25

## 2023-02-25 RX ORDER — FENTANYL/ROPIVACAINE/NS/PF 2-625MCG/1
15 PLASTIC BAG, INJECTION (ML) EPIDURAL ONCE
Status: COMPLETED | OUTPATIENT
Start: 2023-02-25 | End: 2023-02-25

## 2023-02-25 RX ORDER — POTASSIUM CHLORIDE 750 MG/1
40 CAPSULE, EXTENDED RELEASE ORAL EVERY 4 HOURS
Status: COMPLETED | OUTPATIENT
Start: 2023-02-25 | End: 2023-02-25

## 2023-02-25 RX ORDER — OXYCODONE HYDROCHLORIDE AND ACETAMINOPHEN 5; 325 MG/1; MG/1
1 TABLET ORAL EVERY 4 HOURS PRN
Status: DISPENSED | OUTPATIENT
Start: 2023-02-25 | End: 2023-03-04

## 2023-02-25 RX ADMIN — CEFTRIAXONE 2 G: 2 INJECTION, POWDER, FOR SOLUTION INTRAMUSCULAR; INTRAVENOUS at 09:26

## 2023-02-25 RX ADMIN — Medication 10 ML: at 09:00

## 2023-02-25 RX ADMIN — OXYCODONE HYDROCHLORIDE AND ACETAMINOPHEN 1 TABLET: 5; 325 TABLET ORAL at 17:27

## 2023-02-25 RX ADMIN — DEXAMETHASONE SODIUM PHOSPHATE 10 MG: 10 INJECTION, SOLUTION INTRAMUSCULAR; INTRAVENOUS at 10:42

## 2023-02-25 RX ADMIN — ATORVASTATIN CALCIUM 80 MG: 40 TABLET, FILM COATED ORAL at 21:34

## 2023-02-25 RX ADMIN — ASPIRIN 81 MG CHEWABLE TABLET 81 MG: 81 TABLET CHEWABLE at 09:24

## 2023-02-25 RX ADMIN — THIAMINE HYDROCHLORIDE 500 MG: 100 INJECTION, SOLUTION INTRAMUSCULAR; INTRAVENOUS at 10:42

## 2023-02-25 RX ADMIN — POTASSIUM CHLORIDE 40 MEQ: 10 CAPSULE, COATED, EXTENDED RELEASE ORAL at 07:43

## 2023-02-25 RX ADMIN — AMPICILLIN 2 G: 2 INJECTION, POWDER, FOR SOLUTION INTRAVENOUS at 13:14

## 2023-02-25 RX ADMIN — LORAZEPAM 0.5 MG: 2 INJECTION INTRAMUSCULAR; INTRAVENOUS at 00:45

## 2023-02-25 RX ADMIN — POTASSIUM CHLORIDE 40 MEQ: 10 CAPSULE, COATED, EXTENDED RELEASE ORAL at 11:50

## 2023-02-25 RX ADMIN — ACYCLOVIR SODIUM 960 MG: 50 INJECTION, SOLUTION INTRAVENOUS at 05:34

## 2023-02-25 RX ADMIN — PANTOPRAZOLE SODIUM 40 MG: 40 INJECTION, POWDER, FOR SOLUTION INTRAVENOUS at 05:34

## 2023-02-25 RX ADMIN — OXYCODONE HYDROCHLORIDE AND ACETAMINOPHEN 1 TABLET: 5; 325 TABLET ORAL at 21:34

## 2023-02-25 RX ADMIN — LORAZEPAM 0.5 MG: 0.5 TABLET ORAL at 21:55

## 2023-02-25 RX ADMIN — MAGNESIUM SULFATE HEPTAHYDRATE 2 G: 40 INJECTION, SOLUTION INTRAVENOUS at 13:15

## 2023-02-25 RX ADMIN — DEXAMETHASONE SODIUM PHOSPHATE 10 MG: 10 INJECTION, SOLUTION INTRAMUSCULAR; INTRAVENOUS at 14:37

## 2023-02-25 RX ADMIN — MAGNESIUM SULFATE HEPTAHYDRATE 2 G: 40 INJECTION, SOLUTION INTRAVENOUS at 07:43

## 2023-02-25 RX ADMIN — SODIUM PHOSPHATE, MONOBASIC, MONOHYDRATE AND SODIUM PHOSPHATE, DIBASIC, ANHYDROUS 15 MMOL: 276; 142 INJECTION, SOLUTION INTRAVENOUS at 21:55

## 2023-02-25 RX ADMIN — OXYCODONE HYDROCHLORIDE AND ACETAMINOPHEN 1 TABLET: 5; 325 TABLET ORAL at 09:24

## 2023-02-25 RX ADMIN — MAGNESIUM SULFATE HEPTAHYDRATE 2 G: 40 INJECTION, SOLUTION INTRAVENOUS at 10:47

## 2023-02-25 RX ADMIN — ACYCLOVIR SODIUM 960 MG: 50 INJECTION, SOLUTION INTRAVENOUS at 14:37

## 2023-02-25 RX ADMIN — AMPICILLIN 2 G: 2 INJECTION, POWDER, FOR SOLUTION INTRAVENOUS at 03:25

## 2023-02-25 RX ADMIN — POTASSIUM PHOSPHATE, MONOBASIC POTASSIUM PHOSPHATE, DIBASIC 15 MMOL: 224; 236 INJECTION, SOLUTION, CONCENTRATE INTRAVENOUS at 08:12

## 2023-02-25 RX ADMIN — GADOBENATE DIMEGLUMINE 20 ML: 529 INJECTION, SOLUTION INTRAVENOUS at 12:58

## 2023-02-25 RX ADMIN — AMPICILLIN 2 G: 2 INJECTION, POWDER, FOR SOLUTION INTRAVENOUS at 00:45

## 2023-02-25 RX ADMIN — LEVOTHYROXINE SODIUM 100 MCG: 0.1 TABLET ORAL at 05:34

## 2023-02-25 RX ADMIN — DEXAMETHASONE SODIUM PHOSPHATE 10 MG: 10 INJECTION, SOLUTION INTRAMUSCULAR; INTRAVENOUS at 03:27

## 2023-02-25 RX ADMIN — AMPICILLIN 2 G: 2 INJECTION, POWDER, FOR SOLUTION INTRAVENOUS at 09:24

## 2023-02-25 RX ADMIN — Medication 10 ML: at 21:34

## 2023-02-25 RX ADMIN — VANCOMYCIN HYDROCHLORIDE 1000 MG: 1 INJECTION, SOLUTION INTRAVENOUS at 13:14

## 2023-02-26 LAB
ANION GAP SERPL CALCULATED.3IONS-SCNC: 9 MMOL/L (ref 5–15)
BASOPHILS # BLD AUTO: 0.01 10*3/MM3 (ref 0–0.2)
BASOPHILS NFR BLD AUTO: 0.1 % (ref 0–1.5)
BUN SERPL-MCNC: 11 MG/DL (ref 8–23)
BUN/CREAT SERPL: 17.2 (ref 7–25)
CALCIUM SPEC-SCNC: 7.9 MG/DL (ref 8.6–10.5)
CHLORIDE SERPL-SCNC: 106 MMOL/L (ref 98–107)
CO2 SERPL-SCNC: 25 MMOL/L (ref 22–29)
CREAT SERPL-MCNC: 0.64 MG/DL (ref 0.57–1)
DEPRECATED RDW RBC AUTO: 51 FL (ref 37–54)
EGFRCR SERPLBLD CKD-EPI 2021: 92.3 ML/MIN/1.73
EOSINOPHIL # BLD AUTO: 0.01 10*3/MM3 (ref 0–0.4)
EOSINOPHIL NFR BLD AUTO: 0.1 % (ref 0.3–6.2)
ERYTHROCYTE [DISTWIDTH] IN BLOOD BY AUTOMATED COUNT: 14.6 % (ref 12.3–15.4)
GLUCOSE BLDC GLUCOMTR-MCNC: 102 MG/DL (ref 70–130)
GLUCOSE BLDC GLUCOMTR-MCNC: 73 MG/DL (ref 70–130)
GLUCOSE BLDC GLUCOMTR-MCNC: 79 MG/DL (ref 70–130)
GLUCOSE SERPL-MCNC: 98 MG/DL (ref 65–99)
HCT VFR BLD AUTO: 37.9 % (ref 34–46.6)
HGB BLD-MCNC: 12 G/DL (ref 12–15.9)
IMM GRANULOCYTES # BLD AUTO: 0.11 10*3/MM3 (ref 0–0.05)
IMM GRANULOCYTES NFR BLD AUTO: 0.8 % (ref 0–0.5)
LYMPHOCYTES # BLD AUTO: 1.3 10*3/MM3 (ref 0.7–3.1)
LYMPHOCYTES NFR BLD AUTO: 10 % (ref 19.6–45.3)
MAGNESIUM SERPL-MCNC: 2.2 MG/DL (ref 1.6–2.4)
MCH RBC QN AUTO: 30.2 PG (ref 26.6–33)
MCHC RBC AUTO-ENTMCNC: 31.7 G/DL (ref 31.5–35.7)
MCV RBC AUTO: 95.2 FL (ref 79–97)
MONOCYTES # BLD AUTO: 0.99 10*3/MM3 (ref 0.1–0.9)
MONOCYTES NFR BLD AUTO: 7.6 % (ref 5–12)
NEUTROPHILS NFR BLD AUTO: 10.56 10*3/MM3 (ref 1.7–7)
NEUTROPHILS NFR BLD AUTO: 81.4 % (ref 42.7–76)
NRBC BLD AUTO-RTO: 0 /100 WBC (ref 0–0.2)
PHOSPHATE SERPL-MCNC: 2.2 MG/DL (ref 2.5–4.5)
PHOSPHATE SERPL-MCNC: 2.4 MG/DL (ref 2.5–4.5)
PLATELET # BLD AUTO: 238 10*3/MM3 (ref 140–450)
PMV BLD AUTO: 10.1 FL (ref 6–12)
POTASSIUM SERPL-SCNC: 3.8 MMOL/L (ref 3.5–5.2)
RBC # BLD AUTO: 3.98 10*6/MM3 (ref 3.77–5.28)
SODIUM SERPL-SCNC: 140 MMOL/L (ref 136–145)
WBC NRBC COR # BLD: 12.98 10*3/MM3 (ref 3.4–10.8)

## 2023-02-26 PROCEDURE — 84100 ASSAY OF PHOSPHORUS: CPT | Performed by: HOSPITALIST

## 2023-02-26 PROCEDURE — 82962 GLUCOSE BLOOD TEST: CPT

## 2023-02-26 PROCEDURE — 83735 ASSAY OF MAGNESIUM: CPT | Performed by: INTERNAL MEDICINE

## 2023-02-26 PROCEDURE — 99232 SBSQ HOSP IP/OBS MODERATE 35: CPT | Performed by: NURSE PRACTITIONER

## 2023-02-26 PROCEDURE — 84100 ASSAY OF PHOSPHORUS: CPT | Performed by: INTERNAL MEDICINE

## 2023-02-26 PROCEDURE — 80048 BASIC METABOLIC PNL TOTAL CA: CPT | Performed by: INTERNAL MEDICINE

## 2023-02-26 PROCEDURE — 99232 SBSQ HOSP IP/OBS MODERATE 35: CPT | Performed by: HOSPITALIST

## 2023-02-26 PROCEDURE — 85025 COMPLETE CBC W/AUTO DIFF WBC: CPT | Performed by: INTERNAL MEDICINE

## 2023-02-26 RX ORDER — HYDROXYZINE HYDROCHLORIDE 25 MG/1
25 TABLET, FILM COATED ORAL NIGHTLY PRN
Status: DISCONTINUED | OUTPATIENT
Start: 2023-02-26 | End: 2023-03-14

## 2023-02-26 RX ORDER — HYDROXYZINE PAMOATE 25 MG/1
25 CAPSULE ORAL NIGHTLY PRN
Status: DISCONTINUED | OUTPATIENT
Start: 2023-02-26 | End: 2023-02-26 | Stop reason: CLARIF

## 2023-02-26 RX ADMIN — POTASSIUM & SODIUM PHOSPHATES POWDER PACK 280-160-250 MG 2 PACKET: 280-160-250 PACK at 11:29

## 2023-02-26 RX ADMIN — ASPIRIN 81 MG CHEWABLE TABLET 81 MG: 81 TABLET CHEWABLE at 09:07

## 2023-02-26 RX ADMIN — HYDROXYZINE HYDROCHLORIDE 25 MG: 25 TABLET ORAL at 21:34

## 2023-02-26 RX ADMIN — OXYCODONE HYDROCHLORIDE AND ACETAMINOPHEN 1 TABLET: 5; 325 TABLET ORAL at 21:40

## 2023-02-26 RX ADMIN — Medication 10 ML: at 21:40

## 2023-02-26 RX ADMIN — ATORVASTATIN CALCIUM 80 MG: 40 TABLET, FILM COATED ORAL at 21:34

## 2023-02-26 RX ADMIN — LEVOTHYROXINE SODIUM 100 MCG: 0.1 TABLET ORAL at 05:41

## 2023-02-26 RX ADMIN — OXYCODONE HYDROCHLORIDE AND ACETAMINOPHEN 1 TABLET: 5; 325 TABLET ORAL at 05:41

## 2023-02-26 RX ADMIN — Medication 10 ML: at 09:07

## 2023-02-26 RX ADMIN — OXYCODONE HYDROCHLORIDE AND ACETAMINOPHEN 1 TABLET: 5; 325 TABLET ORAL at 17:26

## 2023-02-26 RX ADMIN — PANTOPRAZOLE SODIUM 40 MG: 40 INJECTION, POWDER, FOR SOLUTION INTRAVENOUS at 05:41

## 2023-02-26 RX ADMIN — OXYCODONE HYDROCHLORIDE AND ACETAMINOPHEN 1 TABLET: 5; 325 TABLET ORAL at 10:03

## 2023-02-27 ENCOUNTER — APPOINTMENT (OUTPATIENT)
Dept: GENERAL RADIOLOGY | Facility: HOSPITAL | Age: 78
DRG: 884 | End: 2023-02-27
Payer: MEDICARE

## 2023-02-27 LAB
ANION GAP SERPL CALCULATED.3IONS-SCNC: 7 MMOL/L (ref 5–15)
APPEARANCE CSF: CLEAR
APPEARANCE CSF: CLEAR
BASOPHILS # BLD AUTO: 0.02 10*3/MM3 (ref 0–0.2)
BASOPHILS NFR BLD AUTO: 0.2 % (ref 0–1.5)
BUN SERPL-MCNC: 9 MG/DL (ref 8–23)
BUN/CREAT SERPL: 13.6 (ref 7–25)
C GATTII+NEOFOR DNA CSF QL NAA+NON-PROBE: NOT DETECTED
CALCIUM SPEC-SCNC: 8.4 MG/DL (ref 8.6–10.5)
CHLORIDE SERPL-SCNC: 103 MMOL/L (ref 98–107)
CMV DNA CSF QL NAA+PROBE: NOT DETECTED
CO2 SERPL-SCNC: 30 MMOL/L (ref 22–29)
COLOR CSF: COLORLESS
COLOR CSF: COLORLESS
COLOR SPUN CSF: COLORLESS
COLOR SPUN CSF: COLORLESS
CREAT SERPL-MCNC: 0.66 MG/DL (ref 0.57–1)
CRYPTOC AG CSF QL LA: NEGATIVE
DEPRECATED RDW RBC AUTO: 50.2 FL (ref 37–54)
E COLI K1 DNA CSF QL NAA+NON-PROBE: NOT DETECTED
EGFRCR SERPLBLD CKD-EPI 2021: 91.6 ML/MIN/1.73
EOSINOPHIL # BLD AUTO: 0.1 10*3/MM3 (ref 0–0.4)
EOSINOPHIL NFR BLD AUTO: 1 % (ref 0.3–6.2)
ERYTHROCYTE [DISTWIDTH] IN BLOOD BY AUTOMATED COUNT: 14.5 % (ref 12.3–15.4)
EV RNA CSF QL NAA+PROBE: NOT DETECTED
GLUCOSE BLDC GLUCOMTR-MCNC: 75 MG/DL (ref 70–130)
GLUCOSE BLDC GLUCOMTR-MCNC: 82 MG/DL (ref 70–130)
GLUCOSE BLDC GLUCOMTR-MCNC: 91 MG/DL (ref 70–130)
GLUCOSE CSF-MCNC: 51 MG/DL (ref 40–70)
GLUCOSE SERPL-MCNC: 97 MG/DL (ref 65–99)
GP B STREP DNA SPEC QL NAA+PROBE: NOT DETECTED
HAEM INFLU SEROTYP DNA SPEC NAA+PROBE: NOT DETECTED
HCT VFR BLD AUTO: 40.5 % (ref 34–46.6)
HGB BLD-MCNC: 12.9 G/DL (ref 12–15.9)
HHV6 DNA CSF QL NAA+PROBE: NOT DETECTED
HSV1 DNA CSF QL NAA+PROBE: NOT DETECTED
HSV2 DNA CSF QL NAA+PROBE: NOT DETECTED
IMM GRANULOCYTES # BLD AUTO: 0.03 10*3/MM3 (ref 0–0.05)
IMM GRANULOCYTES NFR BLD AUTO: 0.3 % (ref 0–0.5)
L MONOCYTOG RRNA SPEC QL PROBE: NOT DETECTED
LYMPHOCYTES # BLD AUTO: 1.79 10*3/MM3 (ref 0.7–3.1)
LYMPHOCYTES NFR BLD AUTO: 17.2 % (ref 19.6–45.3)
MCH RBC QN AUTO: 29.7 PG (ref 26.6–33)
MCHC RBC AUTO-ENTMCNC: 31.9 G/DL (ref 31.5–35.7)
MCV RBC AUTO: 93.3 FL (ref 79–97)
MONOCYTES # BLD AUTO: 0.94 10*3/MM3 (ref 0.1–0.9)
MONOCYTES NFR BLD AUTO: 9.1 % (ref 5–12)
N MEN DNA SPEC QL NAA+PROBE: NOT DETECTED
NEUTROPHILS NFR BLD AUTO: 7.5 10*3/MM3 (ref 1.7–7)
NEUTROPHILS NFR BLD AUTO: 72.2 % (ref 42.7–76)
NRBC BLD AUTO-RTO: 0 /100 WBC (ref 0–0.2)
PARECHOVIRUS A RNA CSF QL NAA+NON-PROBE: NOT DETECTED
PLATELET # BLD AUTO: 235 10*3/MM3 (ref 140–450)
PMV BLD AUTO: 9.6 FL (ref 6–12)
POTASSIUM SERPL-SCNC: 4 MMOL/L (ref 3.5–5.2)
PROT CSF-MCNC: 24.3 MG/DL (ref 15–45)
RBC # BLD AUTO: 4.34 10*6/MM3 (ref 3.77–5.28)
RBC # CSF MANUAL: 155 /MM3 (ref 0–5)
RBC # CSF MANUAL: 5 /MM3 (ref 0–5)
S PNEUM DNA CSF QL NAA+NON-PROBE: NOT DETECTED
SODIUM SERPL-SCNC: 140 MMOL/L (ref 136–145)
SPECIMEN VOL CSF: 10.5 ML
SPECIMEN VOL CSF: 10.5 ML
TUBE # CSF: 1
TUBE # CSF: 4
VZV DNA CSF QL NAA+PROBE: NOT DETECTED
WBC # CSF MANUAL: 2 /MM3 (ref 0–5)
WBC # CSF MANUAL: 3 /MM3 (ref 0–5)
WBC NRBC COR # BLD: 10.38 10*3/MM3 (ref 3.4–10.8)

## 2023-02-27 PROCEDURE — 82945 GLUCOSE OTHER FLUID: CPT | Performed by: PSYCHIATRY & NEUROLOGY

## 2023-02-27 PROCEDURE — 92507 TX SP LANG VOICE COMM INDIV: CPT

## 2023-02-27 PROCEDURE — 84157 ASSAY OF PROTEIN OTHER: CPT | Performed by: PSYCHIATRY & NEUROLOGY

## 2023-02-27 PROCEDURE — 87015 SPECIMEN INFECT AGNT CONCNTJ: CPT | Performed by: PSYCHIATRY & NEUROLOGY

## 2023-02-27 PROCEDURE — 92526 ORAL FUNCTION THERAPY: CPT

## 2023-02-27 PROCEDURE — 99232 SBSQ HOSP IP/OBS MODERATE 35: CPT | Performed by: NURSE PRACTITIONER

## 2023-02-27 PROCEDURE — 87070 CULTURE OTHR SPECIMN AEROBIC: CPT | Performed by: PSYCHIATRY & NEUROLOGY

## 2023-02-27 PROCEDURE — 0 LIDOCAINE 1 % SOLUTION: Performed by: PHYSICIAN ASSISTANT

## 2023-02-27 PROCEDURE — 89050 BODY FLUID CELL COUNT: CPT | Performed by: PSYCHIATRY & NEUROLOGY

## 2023-02-27 PROCEDURE — 80048 BASIC METABOLIC PNL TOTAL CA: CPT | Performed by: INTERNAL MEDICINE

## 2023-02-27 PROCEDURE — 97110 THERAPEUTIC EXERCISES: CPT

## 2023-02-27 PROCEDURE — 82962 GLUCOSE BLOOD TEST: CPT

## 2023-02-27 PROCEDURE — 88112 CYTOPATH CELL ENHANCE TECH: CPT

## 2023-02-27 PROCEDURE — 87327 CRYPTOCOCCUS NEOFORM AG IA: CPT | Performed by: PSYCHIATRY & NEUROLOGY

## 2023-02-27 PROCEDURE — 87205 SMEAR GRAM STAIN: CPT | Performed by: PSYCHIATRY & NEUROLOGY

## 2023-02-27 PROCEDURE — 86592 SYPHILIS TEST NON-TREP QUAL: CPT | Performed by: PSYCHIATRY & NEUROLOGY

## 2023-02-27 PROCEDURE — 99232 SBSQ HOSP IP/OBS MODERATE 35: CPT | Performed by: HOSPITALIST

## 2023-02-27 PROCEDURE — 87102 FUNGUS ISOLATION CULTURE: CPT | Performed by: PSYCHIATRY & NEUROLOGY

## 2023-02-27 PROCEDURE — 97116 GAIT TRAINING THERAPY: CPT

## 2023-02-27 PROCEDURE — 85025 COMPLETE CBC W/AUTO DIFF WBC: CPT | Performed by: INTERNAL MEDICINE

## 2023-02-27 PROCEDURE — 87483 CNS DNA AMP PROBE TYPE 12-25: CPT | Performed by: PSYCHIATRY & NEUROLOGY

## 2023-02-27 RX ORDER — OMEPRAZOLE 40 MG/1
1 CAPSULE, DELAYED RELEASE ORAL DAILY
COMMUNITY
Start: 2022-12-19

## 2023-02-27 RX ORDER — IBUPROFEN 800 MG/1
1 TABLET ORAL 3 TIMES DAILY
COMMUNITY
Start: 2023-02-14 | End: 2023-03-17 | Stop reason: HOSPADM

## 2023-02-27 RX ORDER — LIDOCAINE HYDROCHLORIDE 10 MG/ML
5 INJECTION, SOLUTION INFILTRATION; PERINEURAL ONCE
Status: COMPLETED | OUTPATIENT
Start: 2023-02-27 | End: 2023-02-27

## 2023-02-27 RX ORDER — LEVOTHYROXINE SODIUM 0.1 MG/1
1 TABLET ORAL DAILY
COMMUNITY
Start: 2022-11-17

## 2023-02-27 RX ORDER — AMITRIPTYLINE HYDROCHLORIDE 100 MG/1
1 TABLET, FILM COATED ORAL
COMMUNITY
Start: 2022-11-17 | End: 2023-03-17 | Stop reason: HOSPADM

## 2023-02-27 RX ORDER — OXYCODONE HYDROCHLORIDE AND ACETAMINOPHEN 5; 325 MG/1; MG/1
1 TABLET ORAL EVERY 6 HOURS PRN
COMMUNITY
Start: 2023-02-13 | End: 2023-03-17 | Stop reason: HOSPADM

## 2023-02-27 RX ORDER — TRAZODONE HYDROCHLORIDE 150 MG/1
1 TABLET ORAL
COMMUNITY
Start: 2022-11-17 | End: 2023-03-17 | Stop reason: HOSPADM

## 2023-02-27 RX ORDER — PROMETHAZINE HYDROCHLORIDE 25 MG/1
25 TABLET ORAL
COMMUNITY
Start: 2022-12-19

## 2023-02-27 RX ORDER — PANTOPRAZOLE SODIUM 40 MG/1
40 TABLET, DELAYED RELEASE ORAL
Status: DISCONTINUED | OUTPATIENT
Start: 2023-02-28 | End: 2023-03-17 | Stop reason: HOSPADM

## 2023-02-27 RX ORDER — FUROSEMIDE 20 MG/1
1 TABLET ORAL DAILY
COMMUNITY
Start: 2022-11-17 | End: 2023-03-17 | Stop reason: HOSPADM

## 2023-02-27 RX ORDER — CALCIUM GLUCONATE 45(500) MG
TABLET ORAL
COMMUNITY
End: 2023-03-17 | Stop reason: HOSPADM

## 2023-02-27 RX ADMIN — OXYCODONE HYDROCHLORIDE AND ACETAMINOPHEN 1 TABLET: 5; 325 TABLET ORAL at 05:55

## 2023-02-27 RX ADMIN — OXYCODONE HYDROCHLORIDE AND ACETAMINOPHEN 1 TABLET: 5; 325 TABLET ORAL at 15:34

## 2023-02-27 RX ADMIN — HYDROXYZINE HYDROCHLORIDE 25 MG: 25 TABLET ORAL at 21:06

## 2023-02-27 RX ADMIN — PANTOPRAZOLE SODIUM 40 MG: 40 INJECTION, POWDER, FOR SOLUTION INTRAVENOUS at 05:55

## 2023-02-27 RX ADMIN — LIDOCAINE HYDROCHLORIDE 5 ML: 10 INJECTION, SOLUTION INFILTRATION; PERINEURAL at 12:45

## 2023-02-27 RX ADMIN — ATORVASTATIN CALCIUM 80 MG: 40 TABLET, FILM COATED ORAL at 21:07

## 2023-02-27 RX ADMIN — Medication 10 ML: at 21:07

## 2023-02-27 RX ADMIN — OXYCODONE HYDROCHLORIDE AND ACETAMINOPHEN 1 TABLET: 5; 325 TABLET ORAL at 21:06

## 2023-02-27 RX ADMIN — LEVOTHYROXINE SODIUM 100 MCG: 0.1 TABLET ORAL at 05:55

## 2023-02-28 LAB
ALBUMIN SERPL-MCNC: 3.5 G/DL (ref 3.5–5.2)
ALBUMIN/GLOB SERPL: 1.5 G/DL
ALP SERPL-CCNC: 68 U/L (ref 39–117)
ALT SERPL W P-5'-P-CCNC: 19 U/L (ref 1–33)
ANION GAP SERPL CALCULATED.3IONS-SCNC: 10 MMOL/L (ref 5–15)
AST SERPL-CCNC: 25 U/L (ref 1–32)
BACTERIA SPEC AEROBE CULT: NORMAL
BACTERIA SPEC AEROBE CULT: NORMAL
BILIRUB SERPL-MCNC: 0.3 MG/DL (ref 0–1.2)
BUN SERPL-MCNC: 9 MG/DL (ref 8–23)
BUN/CREAT SERPL: 17.3 (ref 7–25)
CALCIUM SPEC-SCNC: 8.7 MG/DL (ref 8.6–10.5)
CHLORIDE SERPL-SCNC: 103 MMOL/L (ref 98–107)
CO2 SERPL-SCNC: 27 MMOL/L (ref 22–29)
CREAT SERPL-MCNC: 0.52 MG/DL (ref 0.57–1)
DEPRECATED RDW RBC AUTO: 48.4 FL (ref 37–54)
EGFRCR SERPLBLD CKD-EPI 2021: 97 ML/MIN/1.73
ERYTHROCYTE [DISTWIDTH] IN BLOOD BY AUTOMATED COUNT: 14.3 % (ref 12.3–15.4)
GLOBULIN UR ELPH-MCNC: 2.4 GM/DL
GLUCOSE SERPL-MCNC: 96 MG/DL (ref 65–99)
HCT VFR BLD AUTO: 42.3 % (ref 34–46.6)
HGB BLD-MCNC: 13.5 G/DL (ref 12–15.9)
MCH RBC QN AUTO: 29.3 PG (ref 26.6–33)
MCHC RBC AUTO-ENTMCNC: 31.9 G/DL (ref 31.5–35.7)
MCV RBC AUTO: 91.8 FL (ref 79–97)
PLATELET # BLD AUTO: 245 10*3/MM3 (ref 140–450)
PMV BLD AUTO: 9.7 FL (ref 6–12)
POTASSIUM SERPL-SCNC: 3.7 MMOL/L (ref 3.5–5.2)
PROT SERPL-MCNC: 5.9 G/DL (ref 6–8.5)
RBC # BLD AUTO: 4.61 10*6/MM3 (ref 3.77–5.28)
REF LAB TEST METHOD: NORMAL
SODIUM SERPL-SCNC: 140 MMOL/L (ref 136–145)
WBC NRBC COR # BLD: 10.7 10*3/MM3 (ref 3.4–10.8)

## 2023-02-28 PROCEDURE — 97116 GAIT TRAINING THERAPY: CPT

## 2023-02-28 PROCEDURE — 99232 SBSQ HOSP IP/OBS MODERATE 35: CPT | Performed by: NURSE PRACTITIONER

## 2023-02-28 PROCEDURE — 92507 TX SP LANG VOICE COMM INDIV: CPT

## 2023-02-28 PROCEDURE — 99232 SBSQ HOSP IP/OBS MODERATE 35: CPT | Performed by: HOSPITALIST

## 2023-02-28 PROCEDURE — 85027 COMPLETE CBC AUTOMATED: CPT | Performed by: HOSPITALIST

## 2023-02-28 PROCEDURE — 80053 COMPREHEN METABOLIC PANEL: CPT | Performed by: HOSPITALIST

## 2023-02-28 PROCEDURE — 97535 SELF CARE MNGMENT TRAINING: CPT

## 2023-02-28 RX ORDER — CHOLECALCIFEROL (VITAMIN D3) 125 MCG
10 CAPSULE ORAL NIGHTLY
Status: DISCONTINUED | OUTPATIENT
Start: 2023-02-28 | End: 2023-03-17 | Stop reason: HOSPADM

## 2023-02-28 RX ADMIN — OXYCODONE HYDROCHLORIDE AND ACETAMINOPHEN 1 TABLET: 5; 325 TABLET ORAL at 21:39

## 2023-02-28 RX ADMIN — Medication 10 MG: at 20:08

## 2023-02-28 RX ADMIN — Medication 10 ML: at 20:09

## 2023-02-28 RX ADMIN — ASPIRIN 81 MG CHEWABLE TABLET 81 MG: 81 TABLET CHEWABLE at 09:39

## 2023-02-28 RX ADMIN — SERTRALINE HYDROCHLORIDE 50 MG: 50 TABLET ORAL at 14:18

## 2023-02-28 RX ADMIN — Medication 10 ML: at 09:39

## 2023-02-28 RX ADMIN — LEVOTHYROXINE SODIUM 100 MCG: 0.1 TABLET ORAL at 05:27

## 2023-02-28 RX ADMIN — HYDROXYZINE HYDROCHLORIDE 25 MG: 25 TABLET ORAL at 21:39

## 2023-02-28 RX ADMIN — ATORVASTATIN CALCIUM 80 MG: 40 TABLET, FILM COATED ORAL at 20:08

## 2023-02-28 RX ADMIN — OXYCODONE HYDROCHLORIDE AND ACETAMINOPHEN 1 TABLET: 5; 325 TABLET ORAL at 17:01

## 2023-02-28 RX ADMIN — PANTOPRAZOLE SODIUM 40 MG: 40 TABLET, DELAYED RELEASE ORAL at 05:27

## 2023-02-28 RX ADMIN — OXYCODONE HYDROCHLORIDE AND ACETAMINOPHEN 1 TABLET: 5; 325 TABLET ORAL at 09:39

## 2023-03-01 LAB
ALBUMIN SERPL-MCNC: 3.8 G/DL (ref 3.5–5.2)
ALBUMIN/GLOB SERPL: 1.7 G/DL
ALP SERPL-CCNC: 80 U/L (ref 39–117)
ALT SERPL W P-5'-P-CCNC: 22 U/L (ref 1–33)
ANION GAP SERPL CALCULATED.3IONS-SCNC: 11 MMOL/L (ref 5–15)
AST SERPL-CCNC: 31 U/L (ref 1–32)
BILIRUB SERPL-MCNC: 0.3 MG/DL (ref 0–1.2)
BUN SERPL-MCNC: 11 MG/DL (ref 8–23)
BUN/CREAT SERPL: 20 (ref 7–25)
CALCIUM SPEC-SCNC: 8.5 MG/DL (ref 8.6–10.5)
CHLORIDE SERPL-SCNC: 105 MMOL/L (ref 98–107)
CO2 SERPL-SCNC: 24 MMOL/L (ref 22–29)
CREAT SERPL-MCNC: 0.55 MG/DL (ref 0.57–1)
DEPRECATED RDW RBC AUTO: 50.1 FL (ref 37–54)
EGFRCR SERPLBLD CKD-EPI 2021: 95.7 ML/MIN/1.73
ERYTHROCYTE [DISTWIDTH] IN BLOOD BY AUTOMATED COUNT: 14.5 % (ref 12.3–15.4)
GLOBULIN UR ELPH-MCNC: 2.3 GM/DL
GLUCOSE SERPL-MCNC: 97 MG/DL (ref 65–99)
HCT VFR BLD AUTO: 43.5 % (ref 34–46.6)
HGB BLD-MCNC: 13.8 G/DL (ref 12–15.9)
MCH RBC QN AUTO: 29.8 PG (ref 26.6–33)
MCHC RBC AUTO-ENTMCNC: 31.7 G/DL (ref 31.5–35.7)
MCV RBC AUTO: 94 FL (ref 79–97)
PLATELET # BLD AUTO: 264 10*3/MM3 (ref 140–450)
PMV BLD AUTO: 10 FL (ref 6–12)
POTASSIUM SERPL-SCNC: 4.2 MMOL/L (ref 3.5–5.2)
PROT SERPL-MCNC: 6.1 G/DL (ref 6–8.5)
RBC # BLD AUTO: 4.63 10*6/MM3 (ref 3.77–5.28)
REAGIN AB CSF QL: NON REACTIVE
SODIUM SERPL-SCNC: 140 MMOL/L (ref 136–145)
WBC NRBC COR # BLD: 10.79 10*3/MM3 (ref 3.4–10.8)

## 2023-03-01 PROCEDURE — 92507 TX SP LANG VOICE COMM INDIV: CPT | Performed by: SPEECH-LANGUAGE PATHOLOGIST

## 2023-03-01 PROCEDURE — 85027 COMPLETE CBC AUTOMATED: CPT | Performed by: HOSPITALIST

## 2023-03-01 PROCEDURE — 99231 SBSQ HOSP IP/OBS SF/LOW 25: CPT | Performed by: HOSPITALIST

## 2023-03-01 PROCEDURE — 97116 GAIT TRAINING THERAPY: CPT

## 2023-03-01 PROCEDURE — 97535 SELF CARE MNGMENT TRAINING: CPT

## 2023-03-01 PROCEDURE — 80053 COMPREHEN METABOLIC PANEL: CPT | Performed by: HOSPITALIST

## 2023-03-01 PROCEDURE — 97530 THERAPEUTIC ACTIVITIES: CPT

## 2023-03-01 RX ADMIN — ASPIRIN 81 MG CHEWABLE TABLET 81 MG: 81 TABLET CHEWABLE at 09:00

## 2023-03-01 RX ADMIN — OXYCODONE HYDROCHLORIDE AND ACETAMINOPHEN 1 TABLET: 5; 325 TABLET ORAL at 15:15

## 2023-03-01 RX ADMIN — Medication 10 ML: at 20:02

## 2023-03-01 RX ADMIN — Medication 10 MG: at 20:02

## 2023-03-01 RX ADMIN — LEVOTHYROXINE SODIUM 100 MCG: 0.1 TABLET ORAL at 05:56

## 2023-03-01 RX ADMIN — OXYCODONE HYDROCHLORIDE AND ACETAMINOPHEN 1 TABLET: 5; 325 TABLET ORAL at 09:30

## 2023-03-01 RX ADMIN — SERTRALINE HYDROCHLORIDE 50 MG: 50 TABLET ORAL at 09:00

## 2023-03-01 RX ADMIN — ATORVASTATIN CALCIUM 80 MG: 40 TABLET, FILM COATED ORAL at 20:02

## 2023-03-01 RX ADMIN — PANTOPRAZOLE SODIUM 40 MG: 40 TABLET, DELAYED RELEASE ORAL at 05:56

## 2023-03-01 NOTE — PLAN OF CARE
Goal Outcome Evaluation:  Plan of Care Reviewed With: patient        Progress: improving   SLP treatment completed. Will continue to address cog-comm deficits. Please see note for further details and recommendations.

## 2023-03-01 NOTE — THERAPY TREATMENT NOTE
Patient Name: Dorothy Zavala  : 1945    MRN: [id number]                              Today's Date: 3/1/2023       Admit Date: 2023    Visit Dx:     ICD-10-CM ICD-9-CM   1. Cognitive communication deficit  R41.841 799.52   2. Dysphagia, unspecified type  R13.10 787.20     Patient Active Problem List   Diagnosis   • Suspected acute ischemic stroke    • Hypertension   • Anxiety and depression   • Hypothyroidism   • Arthritis   • COPD   • B-cell lymphoma    • ? Subdural hematoma   • Frequent falls   • Left wrist fracture   • Illicit drug use (UDS + opiates, BZDs, and TCAs)   • Encephalopathy acute     Past Medical History:   Diagnosis Date   • Anxiety and depression 2023   • Arthritis 2023   • B-cell lymphoma  2023   • COPD 2023   • Hypertension 2023   • Hypothyroidism 2023     History reviewed. No pertinent surgical history.   General Information     Row Name 23 [facility]          OT Time and Intention    Document Type therapy note (daily note)  -AN     Mode of Treatment occupational therapy  -AN     Row Name 23 [facility]          General Information    Patient Profile Reviewed yes  -AN     Existing Precautions/Restrictions fall;other (see comments)  NWB L wrist in brace, ok to WB through elbow  -AN     Barriers to Rehab medically complex  -AN     Row Name 23 [facility]          Cognition    Orientation Status (Cognition) oriented x 4  -AN     Row Name 23 [facility]          Safety Issues, Functional Mobility    Safety Issues Affecting Function (Mobility) safety precautions follow-through/compliance;safety precaution awareness  -AN     Impairments Affecting Function (Mobility) balance;coordination;strength;pain;range of motion (ROM);endurance/activity tolerance;motor planning  -AN     Cognitive Impairments, Mobility Safety/Performance safety precaution awareness;safety precaution follow-through  -AN     Comment, Safety Issues/Impairments (Mobility) cues to maintain NWB of L  wrist throughout with all functional tasks  -AN           User Key  (r) = Recorded By, (t) = Taken By, (c) = Cosigned By    Initials Name Provider Type    Alejandra Calderon OT Occupational Therapist                 Mobility/ADL's     Row Name 03/01/23 1434          Bed Mobility    Bed Mobility supine-sit  -AN     Supine-Sit [place] (Bed Mobility) supervision;verbal cues  -AN     Bed Mobility, Safety Issues impaired trunk control for bed mobility;decreased use of arms for pushing/pulling  -AN     Assistive Device (Bed Mobility) head of bed elevated  -AN     Row Name 03/01/23 1434          Transfers    Transfers sit-stand transfer;stand-sit transfer;bed-chair transfer  -AN     Comment, (Transfers) cues to maintain NWB of L wrist with functional transfers  -AN     Row Name 03/01/23 [facility]          Bed-Chair Transfer    Bed-Chair [place] (Transfers) contact guard  -AN     Assistive Device (Bed-Chair Transfers) other (see comments)  RUE support  -AN     Row Name 03/01/23 [facility]          Sit-Stand Transfer    Sit-Stand [place] (Transfers) contact guard;verbal cues  -AN     Row Name 03/01/23 [facility]          Stand-Sit Transfer    Stand-Sit [place] (Transfers) contact guard;verbal cues  -AN     Row Name 03/01/23 143          Functional Mobility    Functional Mobility- Ind. Level contact guard assist  -AN     Functional Mobility-Distance (Feet) --  household distance  -AN     Functional Mobility- Comment pt ambulated household distance in prep for sink side ADLs requiring RUE assist for stability  -AN     Row Name 03/01/23 1434          Activities of Daily Living    BADL Assessment/Intervention upper body dressing;grooming  -AN     Row Name 03/01/23 1434          Lower Body Dressing Assessment/Training    [place] Level (Lower Body Dressing) don;socks;dependent (less than 25% patient effort)  -AN     Position (Lower Body Dressing) edge of bed sitting  -AN     Row Name 03/01/23 1434          Upper Body  Dressing Assessment/Training    [place] Level (Upper Body Dressing) don;minimum assist (75% patient effort)  gown  -AN     Position (Upper Body Dressing) edge of bed sitting  -AN     Row Name 03/01/23 1434          Grooming Assessment/Training    [place] Level (Grooming) oral care regimen;standby assist  -AN     Position (Grooming) sink side  -AN           User Key  (r) = Recorded By, (t) = Taken By, (c) = Cosigned By    Initials Name Provider Type    Alejandra Calderon OT Occupational Therapist               Obj/Interventions     Row Name 03/01/23 1438          Balance    Balance Assessment sitting static balance;sitting dynamic balance;sit to stand dynamic balance;standing static balance;standing dynamic balance  -AN     Static Sitting Balance supervision  -AN     Dynamic Sitting Balance supervision  -AN     Position, Sitting Balance sitting edge of bed  -AN     Sit to Stand Dynamic Balance verbal cues;contact guard  -AN     Static Standing Balance contact guard  -AN     Dynamic Standing Balance contact guard;verbal cues  -AN     Position/Device Used, Standing Balance supported  -AN     Balance Interventions standing;sit to stand;supported;static;dynamic;minimal challenge;occupation based/functional task  -AN           User Key  (r) = Recorded By, (t) = Taken By, (c) = Cosigned By    Initials Name Provider Type    Alejandra Calderon OT Occupational Therapist               Goals/Plan    No documentation.                Clinical Impression     Row Name 03/01/23 1439          Pain Assessment    Additional Documentation Pain Scale: FACES Pre/Post-Treatment (Group)  -AN     Row Name 03/01/23 1439          Pain Scale: FACES Pre/Post-Treatment    Pain: FACES Scale, Pretreatment 2-->hurts little bit  -AN     Posttreatment Pain Rating 2-->hurts little bit  -AN     Pain Location - Side/Orientation Left  -AN     Pain Location upper  -AN     Pain Location - wrist  -AN     Pre/Posttreatment Pain Comment  tolerated  -AN     Row Name 03/01/23 1439          Plan of Care Review    Plan of Care Reviewed With patient  -AN     Progress improving  -AN     Outcome Evaluation Pt continues to be limited by impaired balance and decreased activity tolerance warranting skilled OT services. Pt requires frequent cues to maintain NWB of LUE throughout with functional tasks. Cont POC.  -AN     Row Name 03/01/23 1439          Therapy Plan Review/Discharge Plan (OT)    Anticipated Discharge Disposition (OT) skilled nursing facility  -AN     Row Name 03/01/23 1439          Vital Signs    Pre Systolic BP Rehab --  VSS  -AN     O2 Delivery Pre Treatment room air  -AN     O2 Delivery Intra Treatment room air  -AN     O2 Delivery Post Treatment room air  -AN     Pre Patient Position Supine  -AN     Intra Patient Position Standing  -AN     Post Patient Position Sitting  -AN     Row Name 03/01/23 1439          Positioning and Restraints    Pre-Treatment Position in bed  -AN     Post Treatment Position chair  -AN     In Chair notified nsg;reclined;call light within reach;exit alarm on;encouraged to call for assist;legs elevated;waffle cushion  -AN           User Key  (r) = Recorded By, (t) = Taken By, (c) = Cosigned By    Initials Name Provider Type    AN Alejandra Jaimes, OT Occupational Therapist               Outcome Measures     Row Name 03/01/23 9426          How much help from another is currently needed...    Putting on and taking off regular lower body clothing? 2  -AN     Bathing (including washing, rinsing, and drying) 2  -AN     Toileting (which includes using toilet bed pan or urinal) 3  -AN     Putting on and taking off regular upper body clothing 3  -AN     Taking care of personal grooming (such as brushing teeth) 3  -AN     Eating meals 3  -AN     AM-PAC 6 Clicks Score (OT) 16  -AN     Row Name 03/01/23 1450          Functional Assessment    Outcome Measure Options AM-PAC 6 Clicks Daily Activity (OT)  -AN           User Key   (r) = Recorded By, (t) = Taken By, (c) = Cosigned By    Initials Name Provider Type    Alejandra Calderon OT Occupational Therapist                Occupational Therapy Education     Title: PT OT SLP Therapies (In Progress)     Topic: Occupational Therapy (In Progress)     Point: ADL training (Done)     Description:   Instruct learner(s) on proper safety adaptation and remediation techniques during self care or transfers.   Instruct in proper use of assistive devices.              Learning Progress Summary           Patient Acceptance, E, VU by AN at 3/1/2023 1455    Acceptance, E, VU by  at 2/28/2023 1025    Acceptance, E, NR by  at 2/24/2023 1425                   Point: Home exercise program (Not Started)     Description:   Instruct learner(s) on appropriate technique for monitoring, assisting and/or progressing therapeutic exercises/activities.              Learner Progress:  Not documented in this visit.          Point: Precautions (Done)     Description:   Instruct learner(s) on prescribed precautions during self-care and functional transfers.              Learning Progress Summary           Patient Acceptance, E, VU by  at 3/1/2023 1455    Acceptance, E, VU by AN at 2/28/2023 1025    Acceptance, E, NR by  at 2/24/2023 1425                   Point: Body mechanics (Done)     Description:   Instruct learner(s) on proper positioning and spine alignment during self-care, functional mobility activities and/or exercises.              Learning Progress Summary           Patient Acceptance, E, VU by AN at 3/1/2023 1455    Acceptance, E, VU by AN at 2/28/2023 1025    Acceptance, E, NR by  at 2/24/2023 1425                               User Key     Initials Effective Dates Name Provider Type Discipline    ANITA 09/02/21 -  Maci Flores OT Occupational Therapist OT    UTE 09/21/21 -  Alejandra Jaimes OT Occupational Therapist OT              OT Recommendation and Plan     Plan of Care Review  Plan of Care  Reviewed With: patient  Progress: improving  Outcome Evaluation: Pt continues to be limited by impaired balance and decreased activity tolerance warranting skilled OT services. Pt requires frequent cues to maintain NWB of LUE throughout with functional tasks. Cont POC.     Time Calculation:    Time Calculation- OT     Row Name 03/01/23 1455             Time Calculation- OT    OT Start Time 1350  -AN      OT Received On 03/01/23  -AN         Timed Charges    73744 - OT Self Care/Mgmt Minutes 15  -AN         Total Minutes    Timed Charges Total Minutes 15  -AN       Total Minutes 15  -AN            User Key  (r) = Recorded By, (t) = Taken By, (c) = Cosigned By    Initials Name Provider Type    AN Alejandra Jaimes OT Occupational Therapist              Therapy Charges for Today     Code Description Service Date Service Provider Modifiers Qty    57175528555 HC OT SELF CARE/MGMT/TRAIN EA 15 MIN 2/28/2023 Alejandra Jaimes OT GO 2    42364948851 HC OT SELF CARE/MGMT/TRAIN EA 15 MIN 3/1/2023 Alejandra Jaimes OT GO 1               Alejandraneeta Jaimes OT  3/1/2023

## 2023-03-01 NOTE — PLAN OF CARE
Goal Outcome Evaluation:  Plan of Care Reviewed With: patient        Progress: improving  Outcome Evaluation: Pt continues to be limited by impaired balance and decreased activity tolerance warranting skilled OT services. Pt requires frequent cues to maintain NWB of LUE throughout with functional tasks. Cont POC.

## 2023-03-01 NOTE — PLAN OF CARE
Goal Outcome Evaluation:  Plan of Care Reviewed With: patient        Progress: no change  Outcome Evaluation: Pt needed cues to maintain NWB L wrist. Pt practiced sit <> stand x5 reps from recliner without LUE use, progressed to no UE use at all. Cues for safety with cane placement during t/f's. Pt amb in hallway with cane in RUE. Pt unsteady, even with cane, and needed cues to avoid using LUE on railing in the hallway. Distance limited by c/o dizziness. Pt able to sit down and was taken back to room in recliner. BP elev. If continues with unsteadiness, may need trial of RW with LUE platform.

## 2023-03-01 NOTE — THERAPY TREATMENT NOTE
Acute Care - Speech Language Pathology Treatment Note  [facility]     Patient Name: Dorothy Zavala  : 1945  MRN: [id number]  Today's Date: 3/1/2023               Admit Date: 2023     Visit Dx:    ICD-10-CM ICD-9-CM   1. Cognitive communication deficit  R41.841 799.52   2. Dysphagia, unspecified type  R13.10 787.20     Patient Active Problem List   Diagnosis   • Suspected acute ischemic stroke    • Hypertension   • Anxiety and depression   • Hypothyroidism   • Arthritis   • COPD   • B-cell lymphoma    • ? Subdural hematoma   • Frequent falls   • Left wrist fracture   • Illicit drug use (UDS + opiates, BZDs, and TCAs)   • Encephalopathy acute     Past Medical History:   Diagnosis Date   • Anxiety and depression 2023   • Arthritis 2023   • B-cell lymphoma  2023   • COPD 2023   • Hypertension 2023   • Hypothyroidism 2023     History reviewed. No pertinent surgical history.    SLP Recommendation and Plan                 Anticipated Discharge Disposition (SLP): unknown, anticipate therapy at next level of care (23)        Predicted Duration Therapy Intervention (Days): until discharge (23)     Daily Summary of Progress (SLP): progress toward functional goals is good (23)           Treatment Assessment (SLP): continued, cognitive-linguistic disorder (23)  Treatment Assessment Comments (SLP): Pt w/ good participation during tx today. pt reports some improvement and is eager for discharge (23)  Plan for Continued Treatment (SLP): continue treatment per plan of care (23)  Plan of Care Reviewed With: patient (23)  Progress: improving (23)      SLP EVALUATION (last 72 hours)     SLP SLC Evaluation     Row Name 23       Communication Assessment/Intervention    Document Type therapy note (daily note)  -CJ    Subjective Information no complaints  -CJ    Patient Observations  alert;cooperative;agree to therapy  -    Patient/Family/Caregiver Comments/Observations no family present  -    Patient Effort good  -    Symptoms Noted During/After Treatment none  -       Pain    Additional Documentation --       Pain Scale: FACES Pre/Post-Treatment    Pain: FACES Scale, Pretreatment 0-->no hurt  -CJ    Posttreatment Pain Rating 0-->no hurt  -       SLP Treatment Clinical Impressions    Treatment Assessment (SLP) continued;cognitive-linguistic disorder  -    Treatment Assessment Comments (SLP) Pt w/ good participation during tx today. pt reports some improvement and is eager for discharge  -    Daily Summary of Progress (SLP) progress toward functional goals is good  -    Plan for Continued Treatment (SLP) continue treatment per plan of care  -    Care Plan Review evaluation/treatment results reviewed;care plan/treatment goals reviewed  -       Recommendations    Therapy Frequency (SLP SLC) 5 days per week  -    Predicted Duration Therapy Intervention (Days) until discharge  -    Anticipated Discharge Disposition (SLP) unknown;anticipate therapy at next level of care  -          User Key  (r) = Recorded By, (t) = Taken By, (c) = Cosigned By    Initials Name Effective Dates     Mena Muhammad, MS CCC-SLP 06/16/21 -     MP Alexander Olmstead, MS CCC-SLP 12/28/21 -     LL Sonal Franz, Speech Therapy Student 01/20/23 -                    EDUCATION  The patient has been educated in the following areas:     Cognitive Impairment Communication Impairment.           [facility] GOALS     Row Name 03/01/23 0905 02/28/23 1045 02/27/23 1550       (LTG) Patient will demonstrate functional swallow for    Diet Texture (Demonstrate functional swallow) -- -- regular textures  -    Liquid viscosity (Demonstrate functional swallow) -- -- thin liquids  -    [place] (Demonstrate functional swallow) -- -- independently (over 90% accuracy)  -    Time Frame (Demonstrate functional  swallow) -- -- 1 week  -    Progress/Outcomes (Demonstrate functional swallow) -- -- goal met  -    Comment (Demonstrate functional swallow) -- -- No s/s of aspiration w/ thin liquid, regular solid, or mixed consistency trials  -       ([facility]) Patient will tolerate trials of    Consistencies Trialed (Tolerate trials) -- -- regular textures;thin liquids  -    Desired Outcome (Tolerate trials) -- -- without signs/symptoms of aspiration  -    [place] (Tolerate trials) -- -- with minimal cues (75-90% accuracy)  -    Time Frame (Tolerate trials) -- -- 1 week  -    Progress/Outcomes (Tolerate trials) -- -- goal met  -       Patient will demonstrate functional speech skills for return to discharge environment    [place] -- with minimal cues  -MP (r) LL (t) MP (c) with minimal cues  -    Time frame -- by discharge  -MP (r) LL (t) MP (c) by [facility]  -    Progress/Outcomes -- goal met  -MP (r) LL (t) MP (c) continuing progress toward goal  -       Patient will demonstrate functional language skills for return to discharge environment     [place] with minimal cues  -CJ with minimal cues  -MP (r) LL (t) MP (c) with minimal cues  -    Time frame by discharge  -CJ by discharge  -MP (r) LL (t) MP (c) by discharge  -    Progress/Outcomes continuing progress toward goal  - continuing progress toward goal  -MP (r) LL (t) MP (c) continuing progress toward goal  -       Patient will demonstrate functional cognitive-linguistic skills for return to discharge environment    [place] -- with minimal cues  -MP (r) LL (t) MP (c) with minimal cues  -    Time frame -- by discharge  -MP (r) LL (t) MP (c) by [facility]  -    Progress/Outcomes -- continuing progress toward goal  -MP (r) LL (t) MP (c) continuing progress toward goal  -       Connected Speech to Express Thoughts Goal 1 (SLP)    Improve Narrative Discourse to Express Thoughts By Goal 1 (SLP) giving multiple definitions of a  word;describing a picture;explaining a proverb or idiom;conversational task on a given topic;80%;with minimal cues (75-90%)  -CJ giving multiple definitions of a word;describing a picture;explaining a proverb or idiom;conversational task on a given topic;80%;with minimal cues (75-90%)  -MP (r) LL (t) MP (c) giving multiple definitions of a word;describing a picture;explaining a proverb or idiom;conversational task on a given topic;80%;with minimal cues (75-90%)  -MH    Time Frame (Connected Speech Goal 1, SLP) short term goal (STG)  -CJ short term goal (STG)  -MP (r) LL (t) MP (c) short term goal (STG)  -MH    Progress (Connected Speech Goal 1, SLP) 50%;70%;with minimal cues (75-90%)  -CJ 0%;60%  -MP (r) LL (t) MP (c) 70%;with minimal cues (75-90%)  -MH    Progress/Outcomes (Connected Speech Goal 1, SLP) continuing progress toward goal  -CJ continuing progress toward goal  -MP (r) LL (t) MP (c) continuing progress toward goal  -MH    Comment (Connected Speech Goal 1, SLP) -- 0/2 proverbs/idiom, 3/5 multiple defintions  -MP (r) LL (t) MP (c) Conversational task on given topic  -       Articulation Goal 1 (SLP)    Improve Articulation Goal 1 (SLP) -- by over-articulating at phrase level;by over-articulating in connected speech;80%;with minimal cues (75-90%)  -MP (r) LL (t) MP (c) by over-articulating at phrase level;by over-articulating in connected speech;80%;with minimal cues (75-90%)  -MH    Time Frame (Articulation Goal 1, SLP) -- short term goal (STG)  -MP (r) LL (t) MP (c) short term goal (STG)  -MH    Progress (Articulation Goal 1, SLP) -- 100%;independently (over 90% accuracy)  -MP (r) LL (t) MP (c) 80%;with minimal cues (75-90%)  -MH    Progress/Outcomes (Articulation Goal 1, SLP) -- goal met  -MP (r) LL (t) MP (c) continuing progress toward goal  -MH    Comment (Articulation Goal 1, SLP) -- 100% intelligbility to unfamiliar listener.  -MP (r) LL (t) MP (c) --       Awareness of Basic Personal Information  Goal 1 (SLP)    Improve Awareness of Basic Personal Information Goal 1 (SLP) naming self, family members;answering open-ended questions regarding personal/biographical information;80%;with minimal cues (75-90%)  -CJ naming self, family members;answering open-ended questions regarding personal/biographical information;80%;with minimal cues (75-90%)  -MP (r) LL (t) MP (c) naming self, family members;answering open-ended questions regarding personal/biographical information;80%;with minimal cues (75-90%)  -MH    Time Frame (Awareness of Basic Personal Information Goal 1, SLP) short term goal (STG)  -CJ short term goal (STG)  -MP (r) LL (t) MP (c) short term goal (STG)  -MH    Progress (Awareness of Basic Personal Information Goal 1, SLP) 70%;with minimal cues (75-90%)  -CJ -- 70%;with minimal cues (75-90%)  -MH    Progress/Outcomes (Awareness of Basic Personal Information Goal 1, SLP) continuing progress toward goal  -CJ goal ongoing  -MP (r) LL (t) MP (c) continuing progress toward goal  -MH       Orientation Goal 1 ([facility])    Improve Orientation Through Goal 1 (SLP) -- demonstrating orientation to place;demonstrating orientation to disease/impairment;80%;with minimal cues (75-90%)  -MP (r) LL (t) MP (c) demonstrating orientation to place;demonstrating orientation to disease/impairment;80%;with minimal cues (75-90%)  -MH    Time Frame (Orientation Goal 1, SLP) -- short term goal (STG)  -MP (r) LL (t) MP (c) short term goal (STG)  -MH    Progress (Orientation Goal 1, SLP) -- 100%;independently (over 90% accuracy)  -MP (r) LL (t) MP (c) 100%;with minimal cues (75-90%)  -MH    Progress/Outcomes (Orientation Goal 1, SLP) -- goal met  -MP (r) LL (t) MP (c) continuing progress toward goal  -MH    Comment (Orientation Goal 1, SLP) -- -- Oriented to place & impairment. F/u for carryover  -MH          User Key  (r) = Recorded By, (t) = Taken By, (c) = Cosigned By    Initials Name Provider Type    Mena Caceres, MS CCC-SLP  Speech and Language Pathologist    Alexander Herrera, MS CCC-SLP Speech and Language Pathologist    MH Estephanie Magaña, MS, CFY-SLP Speech and Language Pathologist    LL Sonal Franz, Speech Therapy Student SLP Student                        Time Calculation:      Time Calculation- SLP     Row Name 03/01/23 1318             Time Calculation- SLP    SLP Start Time 0905  -      SLP Received On 03/01/23  -         Untimed Charges    88515-ZY Treatment/ST Modification Prosth Aug Alter  38  -CJ         Total Minutes    Untimed Charges Total Minutes 38  -CJ       Total Minutes 38  -CJ            User Key  (r) = Recorded By, (t) = Taken By, (c) = Cosigned By    Initials Name Provider Type    Mena Caceres MS CCC-SLP Speech and Language Pathologist                Therapy Charges for Today     Code Description Service Date Service Provider Modifiers Qty    [id number]  ST TREATMENT SPEECH 3 3/1/2023 Mena Muhammad MS CCC-SLP GN 1                     Mena Muhammad MS CCC-SLP  3/1/2023

## 2023-03-01 NOTE — PLAN OF CARE
Goal Outcome Evaluation:   Patient Aox4 upon assessment. Patient took PO medications whole with water with no complications noted. Purewick is in use. Bed alarm is on. Call light is within reach.        Progress: no change

## 2023-03-01 NOTE — CASE MANAGEMENT/SOCIAL WORK
Continued Stay Note  [facility]     Patient Name: Dorothy Zavala  MRN: [id number]  Today's Date: 3/1/2023    Admit Date: 2/23/2023    Plan: [facility]   Discharge Plan     Row Name 03/01/23 1402       Plan    Plan [facility]    Patient/Family in Agreement with Plan yes    Plan Comments Faxed in add'l updated clinicals to Jacqueline at [facility] for review. CM continuing to follow.    Final Discharge Disposition Code 03 - skilled nursing facility (SNF)                            Expected Discharge Date and Time     Expected Discharge Date Expected Discharge Time    Mar 3, 2023             Cortney Coleman RN

## 2023-03-01 NOTE — THERAPY TREATMENT NOTE
Patient Name: Dorothy Zavala  : 1945    MRN: [id number]                              Today's Date: 3/1/2023       Admit Date: 2023    Visit Dx:     ICD-10-CM ICD-9-CM   1. Cognitive communication deficit  R41.841 799.52   2. Dysphagia, unspecified type  R13.10 787.20     Patient Active Problem List   Diagnosis   • Suspected acute ischemic stroke    • Hypertension   • Anxiety and depression   • Hypothyroidism   • Arthritis   • COPD   • B-cell lymphoma    • ? Subdural hematoma   • Frequent falls   • Left wrist fracture   • Illicit drug use (UDS + opiates, BZDs, and TCAs)   • Encephalopathy acute     Past Medical History:   Diagnosis Date   • Anxiety and depression 2023   • Arthritis 2023   • B-cell lymphoma  2023   • COPD 2023   • Hypertension 2023   • Hypothyroidism 2023     History reviewed. No pertinent surgical history.   General Information     Row Name 23 1532          Physical Therapy Time and Intention    Document Type therapy note (daily note)  -     Mode of Treatment physical therapy  -     Row Name 23 1532          General Information    Existing Precautions/Restrictions fall;other (see comments)  NWB L wrist in brace, OK to WB through elbow  -     Row Name 23 1532          Cognition    Orientation Status (Cognition) oriented x 4  -     Row Name 23 1532          Safety Issues, Functional Mobility    Safety Issues Affecting Function (Mobility) safety precaution awareness  -     Impairments Affecting Function (Mobility) balance;strength;pain;endurance/activity tolerance  -           User Key  (r) = Recorded By, (t) = Taken By, (c) = Cosigned By    Initials Name Provider Type    SJ Alyssia Saini, PT Physical Therapist               Mobility     Row Name 23 1614          Bed Mobility    Comment, (Bed Mobility) UIC  -     Row Name 23 1614          Transfers    Comment, (Transfers) Pt needed cues to maintain NWB L wrist.  Pt practiced sit <> stand x5 reps from recliner without LUE use, progressed to no UE use at all. Cues for safety with cane placement during t/f's.  -     Row Name 03/01/23 1614          Sit-Stand Transfer    Sit-Stand Nobles (Transfers) standby assist  -     Assistive Device (Sit-Stand Transfers) cane, straight  -SJ     Row Name 03/01/23 1614          Gait/Stairs (Locomotion)    Nobles Level (Gait) verbal cues;minimum assist (75% patient effort)  -     Assistive Device (Gait) cane, straight  -SJ     Distance in Feet (Gait) 55  -SJ     Deviations/Abnormal Patterns (Gait) bilateral deviations;cuba decreased;gait speed decreased;stride length decreased;weight shifting decreased  -     Bilateral Gait Deviations forward flexed posture  -     Comment, (Gait/Stairs) Pt amb in hallway with cane in RUE. Pt unsteady, even with cane, and needed cues to avoid using LUE on railing in the hallway. Distance limited by c/o dizziness. Pt able to sit down and was taken back to room in recliner. BP elev.  -     Row Name 03/01/23 1614          Mobility    Extremity Weight-bearing Status left upper extremity  -SJ     Left Upper Extremity (Weight-bearing Status) non weight-bearing (NWB)  NWB L wrist  -SJ           User Key  (r) = Recorded By, (t) = Taken By, (c) = Cosigned By    Initials Name Provider Type    Alyssia Villalobos, PT Physical Therapist               Obj/Interventions    No documentation.                Goals/Plan    No documentation.                Clinical Impression     Row Name 03/01/23 1618          Pain    Pretreatment Pain Rating 8/10  -SJ     Posttreatment Pain Rating 8/10  -SJ     Pain Location - Side/Orientation Left  -SJ     Pain Location - wrist  -SJ     Pain Intervention(s) Repositioned;Medication (See MAR)  -     Row Name 03/01/23 1618          Plan of Care Review    Plan of Care Reviewed With patient  -     Progress no change  -     Outcome Evaluation Pt needed cues to  maintain NWB L wrist. Pt practiced sit <> stand x5 reps from recliner without LUE use, progressed to no UE use at all. Cues for safety with cane placement during t/f's. Pt amb in hallway with cane in RUE. Pt unsteady, even with cane, and needed cues to avoid using LUE on railing in the hallway. Distance limited by c/o dizziness. Pt able to sit down and was taken back to room in recliner. BP elev. If continues with unsteadiness, may need trial of RW with LUE platform.  -     Row Name 03/01/23 1618          Vital Signs    Pre Systolic BP Rehab 145  -SJ     Pre Treatment Diastolic BP 81  -SJ     Intra Systolic BP Rehab 169  -SJ     Intra Treatment Diastolic BP 88  -SJ     Post SpO2 (%) 95  -SJ     O2 Delivery Post Treatment room air  -SJ     Pre Patient Position Sitting  -SJ     Post Patient Position Sitting  -     Row Name 03/01/23 1618          Positioning and Restraints    Pre-Treatment Position sitting in chair/recliner  -SJ     Post Treatment Position chair  -SJ     In Chair notified nsg;reclined;call light within reach;encouraged to call for assist;exit alarm on;waffle cushion;heels elevated  -SJ           User Key  (r) = Recorded By, (t) = Taken By, (c) = Cosigned By    Initials Name Provider Type    Alyssia Villalobos, PT Physical Therapist               Outcome Measures     Row Name 03/01/23 1623          How much help from another person do you currently need...    Turning from your back to your side while in flat bed without using bedrails? 4  -SJ     Moving from lying on back to sitting on the side of a flat bed without bedrails? 4  -SJ     Moving to and from a bed to a chair (including a wheelchair)? 3  -SJ     Standing up from a chair using your arms (e.g., wheelchair, bedside chair)? 4  -SJ     Climbing 3-5 steps with a railing? 3  -SJ     To walk in hospital room? 3  -SJ     AM-PAC 6 Clicks Score (PT) 21  -     Highest level of mobility 6 --> Walked 10 steps or more  -     Row Name 03/01/23  1455          Functional Assessment    Outcome Measure Options AM-PAC 6 Clicks Daily Activity (OT)  -AN           User Key  (r) = Recorded By, (t) = Taken By, (c) = Cosigned By    Initials Name Provider Type    Alyssia Villalobos, PT Physical Therapist    Alejandra Calderon, OT Occupational Therapist                             Physical Therapy Education     Title: PT OT SLP Therapies (In Progress)     Topic: Physical Therapy (Done)     Point: Mobility training (Done)     Learning Progress Summary           Patient Acceptance, E, VU by  at 3/1/2023 1623    Acceptance, E, NR by AS at 2/27/2023 0958    Acceptance, E, VU by CM at 2/24/2023 1449   Family Acceptance, E, VU by CM at 2/24/2023 1449                   Point: Home exercise program (Done)     Learning Progress Summary           Patient Acceptance, E, VU by  at 3/1/2023 1623    Acceptance, E, NR by AS at 2/27/2023 0958                   Point: Body mechanics (Done)     Learning Progress Summary           Patient Acceptance, E, VU by  at 3/1/2023 1623    Acceptance, E, NR by AS at 2/27/2023 0958                   Point: Precautions (Done)     Learning Progress Summary           Patient Acceptance, E, VU by  at 3/1/2023 1623    Acceptance, E, NR by AS at 2/27/2023 0958    Acceptance, E, VU by CM at 2/24/2023 1449   Family Acceptance, E, VU by CM at 2/24/2023 1449                               User Key     Initials Effective Dates Name Provider Type [facility] 02/03/23 -  Alyssia Saini, PT Physical Therapist PT    AS 02/03/23 -  Danisha Rai, PTA Physical Therapist Assistant PT    CM 09/22/22 -  Kiana Davis PT Physical Therapist PT              PT Recommendation and Plan     Plan of Care Reviewed With: patient  Progress: no change  Outcome Evaluation: Pt needed cues to maintain NWB L wrist. Pt practiced sit <> stand x5 reps from recliner without LUE use, progressed to no UE use at all. Cues for safety with cane placement during  t/f's. Pt amb in hallway with cane in RUE. Pt unsteady, even with cane, and needed cues to avoid using LUE on railing in the hallway. Distance limited by c/o dizziness. Pt able to sit down and was taken back to room in recliner. BP elev. If continues with unsteadiness, may need trial of RW with LUE platform.     Time Calculation:    PT Charges     Row Name 03/01/23 1623             Time Calculation    Start Time 1532  -SJ      PT Received On 03/01/23  -      PT Goal Re-Cert Due Date 03/06/23  -         Time Calculation- PT    Total Timed Code Minutes- PT 23 minute(s)  -SJ         Timed Charges    97484 - Gait Training Minutes  18  -SJ      88332 - PT Therapeutic Activity Minutes 5  -SJ         Total Minutes    Timed Charges Total Minutes 23  -SJ       Total Minutes 23  -SJ            User Key  (r) = Recorded By, (t) = Taken By, (c) = Cosigned By    Initials Name Provider Type     Alyssia Saini, PT Physical Therapist              Therapy Charges for Today     Code Description Service Date Service Provider Modifiers Qty    71107922380 HC GAIT TRAINING EA 15 MIN 3/1/2023 Alyssia Saini, PT GP 1    46143202137 HC PT THERAPEUTIC ACT EA 15 MIN 3/1/2023 Alyssia Saini, PT GP 1          PT G-Codes  Outcome Measure Options: AM-PAC 6 Clicks Daily Activity (OT)  AM-PAC 6 Clicks Score (PT): 21  AM-PAC 6 Clicks Score (OT): 16  Modified Barnwell Scale: 4 - Moderately severe disability.  Unable to walk without assistance, and unable to attend to own bodily needs without assistance.  PT Discharge Summary  Anticipated Discharge Disposition (PT): skilled nursing facility    Alyssia Saini PT  3/1/2023

## 2023-03-01 NOTE — PROGRESS NOTES
[facility] Medicine Services  PROGRESS NOTE    Patient Name: Dorothy Zavala  : 1945  MRN: [id number]    Date of Admission: 2023  Primary Care Physician: Provider, No Known    Subjective   Subjective     CC: AMS    HPI:   Patient resting in bed. States she continued to have diarrhea overnight and was given imodium this morning, which has helped. Ongoing left wrist pain present.    ROS:  Gen- No fevers, chills  CV- No chest pain, palpitations  Resp- No cough, dyspnea  GI- No N/V, abd pain  +wrist pain  +diarrhea    Objective   Objective     Vital Signs:   Temp:  [98 °F (36.7 °C)-98.4 °F (36.9 °C)] 98.2 °F (36.8 °C)  Heart Rate:  [74-99] 93  Resp:  [16-18] 18  BP: (133-183)/(83-94) 133/83  Flow (L/min):  [2] 2     Physical Exam:  Constitutional: No acute distress, awake, alert  HENT: NCAT, mucous membranes moist  Respiratory: Clear to auscultation bilaterally, respiratory effort normal   Cardiovascular: RRR, no murmurs, rubs, or gallops  Gastrointestinal: Positive bowel sounds, soft, nontender, nondistended  Musculoskeletal: No bilateral ankle edema, left wrist in soft brace  Psychiatric: Appropriate affect, cooperative  Neurologic: Oriented x 3, strength symmetric in all extremities, Cranial Nerves grossly intact to confrontation, speech clear  Skin: No rashes      Results Reviewed:  LAB RESULTS:      Lab 23  0529 23  0659 23  1429 23  0624 23  0546 23  0534 23  2122   WBC 10.79 10.70 10.38 12.98* 13.61*   < >  --    HEMOGLOBIN 13.8 13.5 12.9 12.0 12.1   < >  --    HEMATOCRIT 43.5 42.3 40.5 37.9 39.1   < >  --    PLATELETS 264 245 235 238 223   < >  --    NEUTROS ABS  --   --  7.50* 10.56* 12.48*  --   --    IMMATURE GRANS (ABS)  --   --  0.03 0.11* 0.06*  --   --    LYMPHS ABS  --   --  1.79 1.30 0.59*  --   --    MONOS ABS  --   --  0.94* 0.99* 0.47  --   --    EOS ABS  --   --  0.10 0.01 0.00  --   --    MCV 94.0 91.8 93.3 95.2 96.1   < >   --    PROCALCITONIN  --   --   --   --   --   --  0.02   LACTATE  --   --   --   --   --   --  2.0    < > = values in this interval not displayed.         Lab 03/01/23  0529 02/28/23  0659 02/27/23  1429 02/26/23  1837 02/26/23  0623 02/25/23  1431 02/25/23  0551 02/24/23  0534 02/24/23  0533 02/23/23  1708   SODIUM 140 140 140  --  140  --  139  --  139 138   POTASSIUM 4.2 3.7 4.0  --  3.8 4.8 3.3*  --  3.9 4.4   CHLORIDE 105 103 103  --  106  --  107  --  102 101   CO2 24.0 27.0 30.0*  --  25.0  --  22.0  --  25.0 26.0   ANION GAP 11.0 10.0 7.0  --  9.0  --  10.0  --  12.0 11.0   BUN 11 9 9  --  11  --  12  --  9 6*   CREATININE 0.55* 0.52* 0.66  --  0.64  --  0.58  --  0.75 0.61   EGFR 95.7 97.0 91.6  --  92.3  --  94.5  --  83.1 93.4   GLUCOSE 97 96 97  --  98  --  132*  --  173* 126*   CALCIUM 8.5* 8.7 8.4*  --  7.9*  --  7.8*  --  8.3* 9.0   MAGNESIUM  --   --   --   --  2.2  --  1.4*  --  1.5*  --    PHOSPHORUS  --   --   --  2.4* 2.2* 1.9* 1.8*  --  3.0  --    HEMOGLOBIN A1C  --   --   --   --   --   --   --  5.40  --   --    TSH  --   --   --   --   --   --   --   --   --  2.570         Lab 03/01/23 0529 02/28/23  0659 02/23/23  1708   TOTAL PROTEIN 6.1 5.9* 7.2   ALBUMIN 3.8 3.5 4.2   GLOBULIN 2.3 2.4 3.0   ALT (SGPT) 22 19 18   AST (SGOT) 31 25 23   BILIRUBIN 0.3 0.3 0.3   ALK PHOS 80 68 70             Lab 02/24/23  0533   CHOLESTEROL 229*   LDL CHOL 150*   HDL CHOL 63*   TRIGLYCERIDES 90             Brief Urine Lab Results  (Last result in the past 365 days)      Color   Clarity   Blood   Leuk Est   Nitrite   Protein   CREAT   Urine HCG        02/24/23 1449 Yellow   Cloudy   Negative   Negative   Negative   Trace                 Microbiology Results Abnormal     Procedure Component Value - Date/Time    Culture, CSF - Cerebrospinal Fluid, Lumbar Puncture [[id number]] Collected: 02/27/23 1246    Lab Status: Preliminary result Specimen: Cerebrospinal Fluid from Lumbar Puncture Updated: 03/01/23 0946      CSF Culture No growth at 2 days     Gram Stain Rare (1+) WBCs seen      No organisms seen    Blood Culture - Blood, Hand, Right [[id number]]  (Normal) Collected: 02/23/23 2122    Lab Status: Final result Specimen: Blood from Hand, Right Updated: 02/28/23 2146     Blood Culture No growth at 5 days    Narrative:      Aerobic bottle only      Blood Culture - Blood, Hand, Left [[id number]]  (Normal) Collected: 02/23/23 2122    Lab Status: Final result Specimen: Blood from Hand, Left Updated: 02/28/23 2146     Blood Culture No growth at 5 days    Narrative:      Aerobic bottle only      Meningitis / Encephalitis Panel, PCR - Cerebrospinal Fluid, Lumbar Puncture [[id number]]  (Normal) Collected: 02/27/23 1246    Lab Status: Final result Specimen: Cerebrospinal Fluid from Lumbar Puncture Updated: 02/27/23 1458     ESCHERICHIA COLI K1, PCR Not Detected     HAEMOPHILUS INFLUENZAE, PCR Not Detected     LISTERIA MONOCYTOGENES, PCR Not Detected     NEISSERIA MENINGITIDIS, PCR Not Detected     STREPTOCOCCUS AGALACTIAE, PCR Not Detected     STREPTOCOCCUS PNEUMONIAE, PCR Not Detected     CYTOMEGALOVIRUS (CMV), PCR Not Detected     ENTEROVIRUS, PCR Not Detected     HERPES SIMPLEX VIRUS 1 (HSV-1), PCR Not Detected     HERPES SIMPLEX VIRUS 2 (HSV-2), PCR Not Detected     HUMAN PARECHOVIRUS, PCR Not Detected     VARICELLA ZOSTER VIRUS (VZV), PCR Not Detected     CRYPTOCOCCUS NEOFORMANS / GATTII, PCR Not Detected     HUMAN HERPES VIRUS 6 PCR Not Detected    Cryptococcal AG, CSF - Cerebrospinal Fluid, Lumbar Puncture [[id number]]  (Normal) Collected: 02/27/23 1246    Lab Status: Final result Specimen: Cerebrospinal Fluid from Lumbar Puncture Updated: 02/27/23 1435     Cryptococcal Antigen, CSF Negative          IR LUMBAR PUNCTURE DIAGNOSTIC    Result Date: 3/1/2023  IR LUMBAR PUNCTURE DIAGNOSTIC Date of Exam: 2/27/2023 12:15 PM EST Indication: encephalopathy. Comparison: None available. Technique: A detailed explanation of the procedure,  including the risks, benefits, and alternatives was provided.  A preprocedure timeout was performed. Procedure, risks, complications, and side effects of lumbar puncture were discussed and reviewed in detail with the patient including the possibility for bleeding, infection, needle damage to surrounding structures, and the possibility of a spinal headache. The patient indicated understanding and consented to the procedure. The patient was placed in the prone position on the table. The back was prepped and draped in a sterile fashion. 1% lidocaine was used as a local anesthetic to the skin and subcutaneous tissues. Under fluoroscopic guidance a 22-gauge spinal needle was advanced at the L3-L4 level to the CSF space. Opening pressure was measured recorded at 14 cm H2O. Approximately 10 cc of clear colorless CSF was returned and collected in 4 numbered vials. The sample vials were labeled and sent to pathology for further analysis. Closing pressure was measured and recorded at 12 cm H2O. The needle was removed. The patient tolerated the procedure well, and no immediate complications occurred. Fluoroscopic Time: 12 seconds Number of images saved: 1 Findings: Opening pressure was 14 cm H2O. Closing pressure was 12 cm H2O.     Impression: Impression: Successful fluoroscopic-guided lumbar puncture as above. Opening pressure was 14 cm H2O. Closing pressure was 12 cm H2O. Electronically Signed: Ang Dorman  3/1/2023 3:09 AM EST  Workstation ID: [id number]      Results for orders placed during the hospital encounter of 02/23/23    Adult Transthoracic Echo Complete W/ Cont if Necessary Per Protocol (With Agitated Saline)    Interpretation Summary  •  Left ventricular systolic function is normal. Calculated left ventricular EF = 67.2%  •  Left ventricular diastolic function was normal.  •  Saline test results are negative for right to left atrial level shunt.  •  Estimated right ventricular systolic pressure from tricuspid  regurgitation is normal (<35 mmHg).      Current medications:  Scheduled Meds:aspirin, 81 mg, Oral, Daily   Or  aspirin, 300 mg, Rectal, Daily  atorvastatin, 80 mg, Oral, Nightly  levothyroxine, 100 mcg, Oral, Q AM  melatonin, 10 mg, Oral, Nightly  pantoprazole, 40 mg, Oral, Q AM  sertraline, 50 mg, Oral, Daily  sodium chloride, 10 mL, Intravenous, Q12H      Continuous Infusions:   PRN Meds:.•  hydrOXYzine  •  Magnesium Standard Dose Replacement - Initiate Nurse / BPA Driven Protocol  •  oxyCODONE-acetaminophen  •  Phosphorus Replacement - Initiate Nurse / BPA Driven Protocol  •  Potassium Replacement - Initiate Nurse / BPA Driven Protocol  •  sodium chloride  •  sodium chloride    Assessment & Plan   Assessment & Plan     Active Hospital Problems    Diagnosis  POA   • **Encephalopathy acute [G93.40]  Yes   • Suspected acute ischemic stroke  [I63.9]  Yes   • Hypertension [I10]  Yes   • Anxiety and depression [F41.9, F32.A]  Yes   • Hypothyroidism [E03.9]  Yes   • Arthritis [M19.90]  Yes   • COPD [J44.9]  Yes   • B-cell lymphoma  [C85.90]  Yes   • ? Subdural hematoma [S06.5XAA]  Yes   • Frequent falls [R29.6]  Not Applicable   • Left wrist fracture [S62.102A]  Yes   • Illicit drug use (UDS + opiates, BZDs, and TCAs) [F19.90]  Yes      Resolved Hospital Problems   No resolved problems to display.        Brief Hospital Course to date:  77 y.o. 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 Dukes in [facility]), current falls with numerous fractures, who presents to [facility] on 2/23/2023 was transferred from [facility] for evaluation of stroke.     Stroke imaging was negative.     Per report, history from son (who is apparently estranged from his mother) suggests that she has had declining neurologic status.     Patient started on antibiotics  initially for empiric coverage of meningitis per neurology.  They no longer feels her symptoms are concerning for meningitis and their note states that it is okay to stop antibiotics, off antibiotics and observing     Encephalopathy is improved but neurology concerned for chronic meningitis. LP in IR planned for 2/27/23.    This patient's problems and plans were partially entered by my partner and updated as appropriate by me 03/01/23.    Encephalopathy  - improved  - possible alcohol/medication withdrawal  - neurology concerned for chronic meningitis. LP per IR on 2/27/23 stable- culture negative at 48 hrs and meningitis/encephalitis panel negative  - PT/OT/SLP    Depression  - patient complaining of depression but states she no longer wants to take her Trazodone.  - plan to start Zoloft 50mg PO daily    Possible CVA/TIA  -asa/statin  -stroke team following    Hpyothyroidism  -synthroid    Lymphoma  -needs oncology follow up    Wrist fracture, L  -with recent cast/splint removal, ask orthopedic surgery if need to replace splint/cast  - seen by orthopedic surgery, Dr. Ramos-  Recommends patient return in 1 to 2 weeks to clinic for repeat radiographs.  Okay for weightbearing through elbow left upper extremity, otherwise nonweightbearing left upper extremity     Expected Discharge Location and Transportation: Rehab  Expected Discharge   Expected Discharge Date and Time     Expected Discharge Date Expected Discharge Time    Mar 3, 2023            DVT prophylaxis:  Mechanical DVT prophylaxis orders are present.     AM-PAC 6 Clicks Score (PT): 18 (02/28/23 1146)    CODE STATUS:   Code Status and Medical Interventions:   Ordered at: 02/23/23 1517     Code Status (Patient has no pulse and is not breathing):    CPR (Attempt to Resuscitate)     Medical Interventions (Patient has pulse or is breathing):    Full Support       Sarah Valle DO  03/01/23

## 2023-03-02 LAB
ALBUMIN SERPL-MCNC: 3.7 G/DL (ref 3.5–5.2)
ALBUMIN/GLOB SERPL: 1.5 G/DL
ALP SERPL-CCNC: 78 U/L (ref 39–117)
ALT SERPL W P-5'-P-CCNC: 22 U/L (ref 1–33)
ANION GAP SERPL CALCULATED.3IONS-SCNC: 9 MMOL/L (ref 5–15)
AST SERPL-CCNC: 21 U/L (ref 1–32)
BACTERIA SPEC AEROBE CULT: NORMAL
BILIRUB SERPL-MCNC: 0.4 MG/DL (ref 0–1.2)
BUN SERPL-MCNC: 14 MG/DL (ref 8–23)
BUN/CREAT SERPL: 21.5 (ref 7–25)
CALCIUM SPEC-SCNC: 9 MG/DL (ref 8.6–10.5)
CHLORIDE SERPL-SCNC: 102 MMOL/L (ref 98–107)
CO2 SERPL-SCNC: 27 MMOL/L (ref 22–29)
CREAT SERPL-MCNC: 0.65 MG/DL (ref 0.57–1)
DEPRECATED RDW RBC AUTO: 48.8 FL (ref 37–54)
EGFRCR SERPLBLD CKD-EPI 2021: 91.9 ML/MIN/1.73
ERYTHROCYTE [DISTWIDTH] IN BLOOD BY AUTOMATED COUNT: 14.3 % (ref 12.3–15.4)
GLOBULIN UR ELPH-MCNC: 2.4 GM/DL
GLUCOSE SERPL-MCNC: 95 MG/DL (ref 65–99)
GRAM STN SPEC: NORMAL
GRAM STN SPEC: NORMAL
HCT VFR BLD AUTO: 43.6 % (ref 34–46.6)
HGB BLD-MCNC: 13.8 G/DL (ref 12–15.9)
MCH RBC QN AUTO: 29.4 PG (ref 26.6–33)
MCHC RBC AUTO-ENTMCNC: 31.7 G/DL (ref 31.5–35.7)
MCV RBC AUTO: 92.8 FL (ref 79–97)
PLATELET # BLD AUTO: 273 10*3/MM3 (ref 140–450)
PMV BLD AUTO: 9.9 FL (ref 6–12)
POTASSIUM SERPL-SCNC: 4.4 MMOL/L (ref 3.5–5.2)
PROT SERPL-MCNC: 6.1 G/DL (ref 6–8.5)
RBC # BLD AUTO: 4.7 10*6/MM3 (ref 3.77–5.28)
SODIUM SERPL-SCNC: 138 MMOL/L (ref 136–145)
WBC NRBC COR # BLD: 11.09 10*3/MM3 (ref 3.4–10.8)

## 2023-03-02 PROCEDURE — 85027 COMPLETE CBC AUTOMATED: CPT | Performed by: HOSPITALIST

## 2023-03-02 PROCEDURE — 99231 SBSQ HOSP IP/OBS SF/LOW 25: CPT | Performed by: HOSPITALIST

## 2023-03-02 PROCEDURE — 80053 COMPREHEN METABOLIC PANEL: CPT | Performed by: HOSPITALIST

## 2023-03-02 PROCEDURE — 92507 TX SP LANG VOICE COMM INDIV: CPT

## 2023-03-02 RX ADMIN — ATORVASTATIN CALCIUM 80 MG: 40 TABLET, FILM COATED ORAL at 21:20

## 2023-03-02 RX ADMIN — HYDROXYZINE HYDROCHLORIDE 25 MG: 25 TABLET ORAL at 21:21

## 2023-03-02 RX ADMIN — LEVOTHYROXINE SODIUM 100 MCG: 0.1 TABLET ORAL at 05:16

## 2023-03-02 RX ADMIN — SERTRALINE HYDROCHLORIDE 50 MG: 50 TABLET ORAL at 08:20

## 2023-03-02 RX ADMIN — Medication 10 ML: at 08:21

## 2023-03-02 RX ADMIN — Medication 10 ML: at 21:22

## 2023-03-02 RX ADMIN — Medication 10 MG: at 21:21

## 2023-03-02 RX ADMIN — OXYCODONE HYDROCHLORIDE AND ACETAMINOPHEN 1 TABLET: 5; 325 TABLET ORAL at 21:27

## 2023-03-02 RX ADMIN — PANTOPRAZOLE SODIUM 40 MG: 40 TABLET, DELAYED RELEASE ORAL at 05:16

## 2023-03-02 RX ADMIN — OXYCODONE HYDROCHLORIDE AND ACETAMINOPHEN 1 TABLET: 5; 325 TABLET ORAL at 17:54

## 2023-03-02 RX ADMIN — OXYCODONE HYDROCHLORIDE AND ACETAMINOPHEN 1 TABLET: 5; 325 TABLET ORAL at 08:20

## 2023-03-02 RX ADMIN — ASPIRIN 81 MG CHEWABLE TABLET 81 MG: 81 TABLET CHEWABLE at 08:20

## 2023-03-02 NOTE — PROGRESS NOTES
[facility] Medicine Services  PROGRESS NOTE    Patient Name: Dorothy Zavala  : 1945  MRN: [id number]    Date of Admission: 2023  Primary Care Physician: Provider, No Known    Subjective   Subjective     CC: AMS    HPI:   Patient resting in bed. States diarrhea is much better after taking Imodium. She states she continues to have left wrist pain.    ROS:  Gen- No fevers, chills  CV- No chest pain, palpitations  Resp- No cough, dyspnea  GI- No N/V, abd pain  +wrist pain    Objective   Objective     Vital Signs:   Temp:  [97.6 °F (36.4 °C)-98.3 °F (36.8 °C)] 98.3 °F (36.8 °C)  Heart Rate:  [] 72  Resp:  [18-20] 18  BP: (149-169)/(78-98) 149/78     Physical Exam:  Constitutional: No acute distress, awake, alert  HENT: NCAT, mucous membranes moist  Respiratory: Clear to auscultation bilaterally, respiratory effort normal   Cardiovascular: RRR, no murmurs, rubs, or gallops  Gastrointestinal: Positive bowel sounds, soft, nontender, nondistended  Musculoskeletal: No bilateral ankle edema, left wrist in soft brace  Psychiatric: Appropriate affect, cooperative  Neurologic: Oriented x 3, strength symmetric in all extremities, Cranial Nerves grossly intact to confrontation, speech clear  Skin: No rashes      Results Reviewed:  LAB RESULTS:      Lab 23  0600 23  0529 23  0659 23  1429 23  0624 23  0546 23  0534 23  2122   WBC 11.09* 10.79 10.70 10.38 12.98* 13.61*   < >  --    HEMOGLOBIN 13.8 13.8 13.5 12.9 12.0 12.1   < >  --    HEMATOCRIT 43.6 43.5 42.3 40.5 37.9 39.1   < >  --    PLATELETS 273 264 245 235 238 223   < >  --    NEUTROS ABS  --   --   --  7.50* 10.56* 12.48*  --   --    IMMATURE GRANS (ABS)  --   --   --  0.03 0.11* 0.06*  --   --    LYMPHS ABS  --   --   --  1.79 1.30 0.59*  --   --    MONOS ABS  --   --   --  0.94* 0.99* 0.47  --   --    EOS ABS  --   --   --  0.10 0.01 0.00  --   --    MCV 92.8 94.0 91.8 93.3 95.2 96.1    < >  --    PROCALCITONIN  --   --   --   --   --   --   --  0.02   LACTATE  --   --   --   --   --   --   --  2.0    < > = values in this interval not displayed.         Lab 03/02/23  0600 03/01/23  0529 02/28/23  0659 02/27/23  1429 02/26/23  1837 02/26/23  0623 02/25/23  1431 02/25/23  0551 02/24/23  0534 02/24/23  0533 02/23/23  1708   SODIUM 138 140 140 140  --  140  --  139  --  139 138   POTASSIUM 4.4 4.2 3.7 4.0  --  3.8 4.8 3.3*  --  3.9 4.4   CHLORIDE 102 105 103 103  --  106  --  107  --  102 101   CO2 27.0 24.0 27.0 30.0*  --  25.0  --  22.0  --  25.0 26.0   ANION GAP 9.0 11.0 10.0 7.0  --  9.0  --  10.0  --  12.0 11.0   BUN 14 11 9 9  --  11  --  12  --  9 6*   CREATININE 0.65 0.55* 0.52* 0.66  --  0.64  --  0.58  --  0.75 0.61   EGFR 91.9 95.7 97.0 91.6  --  92.3  --  94.5  --  83.1 93.4   GLUCOSE 95 97 96 97  --  98  --  132*  --  173* 126*   CALCIUM 9.0 8.5* 8.7 8.4*  --  7.9*  --  7.8*  --  8.3* 9.0   MAGNESIUM  --   --   --   --   --  2.2  --  1.4*  --  1.5*  --    PHOSPHORUS  --   --   --   --  2.4* 2.2* 1.9* 1.8*  --  3.0  --    HEMOGLOBIN A1C  --   --   --   --   --   --   --   --  5.40  --   --    TSH  --   --   --   --   --   --   --   --   --   --  2.570         Lab 03/02/23  0600 03/01/23  0529 02/28/23  0659 02/23/23  1708   TOTAL PROTEIN 6.1 6.1 5.9* 7.2   ALBUMIN 3.7 3.8 3.5 4.2   GLOBULIN 2.4 2.3 2.4 3.0   ALT (SGPT) 22 22 19 18   AST (SGOT) 21 31 25 23   BILIRUBIN 0.4 0.3 0.3 0.3   ALK PHOS 78 80 68 70             Lab 02/24/23  0533   CHOLESTEROL 229*   LDL CHOL 150*   HDL CHOL 63*   TRIGLYCERIDES 90             Brief Urine Lab Results  (Last result in the past 365 days)      Color   Clarity   Blood   Leuk Est   Nitrite   Protein   CREAT   Urine HCG        02/24/23 1449 Yellow   Cloudy   Negative   Negative   Negative   Trace                 Microbiology Results Abnormal     Procedure Component Value - Date/Time    Culture, CSF - Cerebrospinal Fluid, Lumbar Puncture [[id number]]  Collected: 02/27/23 1246    Lab Status: Final result Specimen: Cerebrospinal Fluid from Lumbar Puncture Updated: 03/02/23 1008     CSF Culture No growth at 3 days     Gram Stain Rare (1+) WBCs seen      No organisms seen    Blood Culture - Blood, Hand, Right [[id number]]  (Normal) Collected: 02/23/23 2122    Lab Status: Final result Specimen: Blood from Hand, Right Updated: 02/28/23 2146     Blood Culture No growth at 5 days    Narrative:      Aerobic bottle only      Blood Culture - Blood, Hand, Left [[id number]]  (Normal) Collected: 02/23/23 2122    Lab Status: Final result Specimen: Blood from Hand, Left Updated: 02/28/23 2146     Blood Culture No growth at 5 days    Narrative:      Aerobic bottle only      Meningitis / Encephalitis Panel, PCR - Cerebrospinal Fluid, Lumbar Puncture [[id number]]  (Normal) Collected: 02/27/23 1246    Lab Status: Final result Specimen: Cerebrospinal Fluid from Lumbar Puncture Updated: 02/27/23 1458     ESCHERICHIA COLI K1, PCR Not Detected     HAEMOPHILUS INFLUENZAE, PCR Not Detected     LISTERIA MONOCYTOGENES, PCR Not Detected     NEISSERIA MENINGITIDIS, PCR Not Detected     STREPTOCOCCUS AGALACTIAE, PCR Not Detected     STREPTOCOCCUS PNEUMONIAE, PCR Not Detected     CYTOMEGALOVIRUS (CMV), PCR Not Detected     ENTEROVIRUS, PCR Not Detected     HERPES SIMPLEX VIRUS 1 (HSV-1), PCR Not Detected     HERPES SIMPLEX VIRUS 2 (HSV-2), PCR Not Detected     HUMAN PARECHOVIRUS, PCR Not Detected     VARICELLA ZOSTER VIRUS (VZV), PCR Not Detected     CRYPTOCOCCUS NEOFORMANS / GATTII, PCR Not Detected     HUMAN HERPES VIRUS 6 PCR Not Detected    Cryptococcal AG, CSF - Cerebrospinal Fluid, Lumbar Puncture [[id number]]  (Normal) Collected: 02/27/23 1246    Lab Status: Final result Specimen: Cerebrospinal Fluid from Lumbar Puncture Updated: 02/27/23 1435     Cryptococcal Antigen, CSF Negative          No radiology results from the last 24 hrs    Results for orders placed during the hospital encounter  of 02/23/23    Adult Transthoracic Echo Complete W/ Cont if Necessary Per Protocol (With Agitated Saline)    Interpretation Summary  •  Left ventricular systolic function is normal. Calculated left ventricular EF = 67.2%  •  Left ventricular diastolic function was normal.  •  Saline test results are negative for right to left atrial level shunt.  •  Estimated right ventricular systolic pressure from tricuspid regurgitation is normal (<35 mmHg).      Current medications:  Scheduled Meds:aspirin, 81 mg, Oral, Daily   Or  aspirin, 300 mg, Rectal, Daily  atorvastatin, 80 mg, Oral, Nightly  levothyroxine, 100 mcg, Oral, Q AM  melatonin, 10 mg, Oral, Nightly  pantoprazole, 40 mg, Oral, Q AM  sertraline, 50 mg, Oral, Daily  sodium chloride, 10 mL, Intravenous, Q12H      Continuous Infusions:   PRN Meds:.•  hydrOXYzine  •  Magnesium Standard Dose Replacement - Initiate Nurse / BPA Driven Protocol  •  oxyCODONE-acetaminophen  •  Phosphorus Replacement - Initiate Nurse / BPA Driven Protocol  •  Potassium Replacement - Initiate Nurse / BPA Driven Protocol  •  sodium chloride  •  sodium chloride    Assessment & Plan   Assessment & Plan     Active Hospital Problems    Diagnosis  POA   • **Encephalopathy acute [G93.40]  Yes   • Suspected acute ischemic stroke  [I63.9]  Yes   • Hypertension [I10]  Yes   • Anxiety and depression [F41.9, F32.A]  Yes   • Hypothyroidism [E03.9]  Yes   • Arthritis [M19.90]  Yes   • COPD [J44.9]  Yes   • B-cell lymphoma  [C85.90]  Yes   • ? Subdural hematoma [S06.5XAA]  Yes   • Frequent falls [R29.6]  Not Applicable   • Left wrist fracture [S62.102A]  Yes   • Illicit drug use (UDS + opiates, BZDs, and TCAs) [F19.90]  Yes      Resolved Hospital Problems   No resolved problems to display.        Brief Hospital Course to date:  77 y.o. with history of hypothyroidism, chronic back pain, osteoarthritis, COPD, anxiety/depression, suspected low-grade B-cell lymphoma (diagnosed 2021 via gastric biopsy  previously followed by Dr. Dukes in [facility]), current falls with numerous fractures, who presents to [facility] on 2/23/2023 was transferred from [facility] for evaluation of stroke.     Stroke imaging was negative.     Per report, history from son (who is apparently estranged from his mother) suggests that she has had declining neurologic status.     Patient started on antibiotics initially for empiric coverage of meningitis per neurology.  They no longer feels her symptoms are concerning for meningitis and their note states that it is okay to stop antibiotics, off antibiotics and observing     Encephalopathy is improved but neurology concerned for chronic meningitis. LP in IR planned for 2/27/23.    This patient's problems and plans were partially entered by my partner and updated as appropriate by me 03/02/23.    Encephalopathy  - improved  - possible alcohol/medication withdrawal  - neurology concerned for chronic meningitis. LP per IR on 2/27/23 stable- culture negative at 48 hrs and meningitis/encephalitis panel negative  - PT/OT/SLP    Depression  - patient complaining of depression but states she no longer wants to take her Trazodone.  - plan to start Zoloft 50mg PO daily    Possible CVA/TIA  -asa/statin  -stroke team following    Hpyothyroidism  -synthroid    Lymphoma  -needs oncology follow up    Wrist fracture, L  -with recent cast/splint removal, ask orthopedic surgery if need to replace splint/cast  - seen by orthopedic surgery, Dr. Ramos-  Recommends patient return in 1 to 2 weeks to clinic for repeat radiographs.  Okay for weightbearing through elbow left upper extremity, otherwise nonweightbearing left upper extremity     Expected Discharge Location and Transportation: Rehab- [facility] pending  Expected Discharge   Expected Discharge Date and Time     Expected Discharge Date Expected Discharge Time    Mar 3, 2023            DVT prophylaxis:  Mechanical DVT  prophylaxis orders are present.     AM-PAC 6 Clicks Score (PT): 21 (03/02/23 0820)    CODE STATUS:   Code Status and Medical Interventions:   Ordered at: 02/23/23 1511     Code Status (Patient has no pulse and is not breathing):    CPR (Attempt to Resuscitate)     Medical Interventions (Patient has pulse or is breathing):    Full Support       Sarah Valle DO  03/02/23

## 2023-03-02 NOTE — THERAPY TREATMENT NOTE
Acute Care - Speech Language Pathology Treatment Note  [facility]     Patient Name: Dorothy Zavala  : 1945  MRN: [id number]  Today's Date: 3/2/2023               Admit Date: 2023     Visit Dx:    ICD-10-CM ICD-9-CM   1. Cognitive communication deficit  R41.841 799.52   2. Dysphagia, unspecified type  R13.10 787.20     Patient Active Problem List   Diagnosis   • Suspected acute ischemic stroke    • Hypertension   • Anxiety and depression   • Hypothyroidism   • Arthritis   • COPD   • B-cell lymphoma    • ? Subdural hematoma   • Frequent falls   • Left wrist fracture   • Illicit drug use (UDS + opiates, BZDs, and TCAs)   • Encephalopathy acute     Past Medical History:   Diagnosis Date   • Anxiety and depression 2023   • Arthritis 2023   • B-cell lymphoma  2023   • COPD 2023   • Hypertension 2023   • Hypothyroidism 2023     History reviewed. No pertinent surgical history.    SLP Recommendation and Plan                 Anticipated Discharge Disposition (SLP): unknown, anticipate therapy at next level of care (23 1400)        Predicted Duration Therapy Intervention (Days): until discharge (23 1400)     Daily Summary of Progress (SLP): progress toward functional goals is good (23 1400)           Treatment Assessment (SLP): continued, cognitive-linguistic disorder (23 1400)     Plan for Continued Treatment (SLP): continue treatment per plan of care (23 1400)  Plan of Care Reviewed With: patient (23 0771)      SLP EVALUATION (last 72 hours)     SLP SLC Evaluation     Row Name 23 1400 23 0905 23 1045             Communication Assessment/Intervention    Document Type therapy note (daily note)  -MP therapy note (daily note)  -CJ therapy note (daily note)  -MP (r) LL (t) MP (c)      Subjective Information no complaints  -MP no complaints  -CJ no complaints  -MP (r) LL (t) MP (c)      Patient Observations alert;cooperative  -MP  alert;cooperative;agree to therapy  -CJ alert;cooperative  -MP (r) LL (t) MP (c)      Patient/Family/Caregiver Comments/Observations No family present  -MP no family present  -CJ No family present  -MP (r) LL (t) MP (c)      Patient Effort good  -MP good  -CJ good  -MP (r) LL (t) MP (c)      Symptoms Noted During/After Treatment -- none  -CJ --         Pain    Additional Documentation Pain Scale: FACES Pre/Post-Treatment (Group)  -MP -- Pain Scale: FACES Pre/Post-Treatment (Group)  -MP (r) LL (t) MP (c)         Pain Scale: FACES Pre/Post-Treatment    Pain: FACES Scale, Pretreatment 0-->no hurt  -MP 0-->no hurt  -CJ 0-->no hurt  -MP (r) LL (t) MP (c)      Posttreatment Pain Rating 0-->no hurt  -MP 0-->no hurt  -CJ 0-->no hurt  -MP (r) LL (t) MP (c)         SLP Treatment Clinical Impressions    Treatment Assessment (SLP) continued;cognitive-linguistic disorder  -MP continued;cognitive-linguistic disorder  -CJ continued;cognitive-linguistic disorder;no clinical signs of;dysarthria  -MP (r) LL (t) MP (c)      Treatment Assessment Comments (SLP) -- Pt w/ good participation during tx today. pt reports some improvement and is eager for discharge  -CJ Targeted cognitive/communication tasks. Improvements in dysarthria.  -MP (r) LL (t) MP (c)      Daily Summary of Progress (SLP) progress toward functional goals is good  -MP progress toward functional goals is good  -CJ progress toward functional goals is good  -MP (r) LL (t) MP (c)      Plan for Continued Treatment (SLP) continue treatment per plan of care  -MP continue treatment per plan of care  -CJ continue treatment per plan of care  -MP (r) LL (t) MP (c)      Care Plan Review care plan/treatment goals reviewed;patient/other agree to care plan  -MP evaluation/treatment results reviewed;care plan/treatment goals reviewed  -CJ care plan/treatment goals reviewed  -MP (r) LL (t) MP (c)         Recommendations    Therapy Frequency (SLP SLC) 5 days per week  -MP 5 days per week   -CJ 5 days per week  -MP (r) LL (t) MP (c)      Predicted Duration Therapy Intervention (Days) until discharge  -MP until discharge  -CJ until discharge  -MP (r) LL (t) MP (c)      Anticipated Discharge Disposition (SLP) unknown;anticipate therapy at next level of care  -MP unknown;anticipate therapy at next level of care  -CJ unknown;anticipate therapy at next level of care  -MP (r) LL (t) MP (c)            User Key  (r) = Recorded By, (t) = Taken By, (c) = Cosigned By    Initials Name Effective Dates    CJ Mena Muhammad, MS CCC-SLP 06/16/21 -     MP Alexander Olmstead, MS CCC-SLP 12/28/21 -     LL Sonal Franz, Speech Therapy Student 01/20/23 -                    EDUCATION  The patient has been educated in the following areas:     Cognitive Impairment Communication Impairment.           SLP GOALS     Row Name 03/02/23 1400 03/01/23 0905 02/28/23 1045       Patient will demonstrate functional speech skills for return to discharge environment    [place] -- -- with minimal cues  -MP (r) LL (t) MP (c)    Time frame -- -- by discharge  -MP (r) LL (t) MP (c)    Progress/Outcomes -- -- goal met  -MP (r) LL (t) MP (c)       Patient will demonstrate functional language skills for return to discharge environment     [place] with minimal cues  -MP with minimal cues  -CJ with minimal cues  -MP (r) LL (t) MP (c)    Time frame by discharge  -MP by discharge  -CJ by discharge  -MP (r) LL (t) MP (c)    Progress/Outcomes continuing progress toward goal  -MP continuing progress toward goal  -CJ continuing progress toward goal  -MP (r) LL (t) MP (c)       Patient will demonstrate functional cognitive-linguistic skills for return to discharge environment    [place] with minimal cues  -MP -- with minimal cues  -MP (r) LL (t) MP (c)    Time frame by discharge  -MP -- by discharge  -MP (r) LL (t) MP (c)    Progress/Outcomes continuing progress toward goal  -MP -- continuing progress toward goal  -MP (r) LL (t)  MP (c)       Connected Speech to Express Thoughts Goal 1 (SLP)    Improve Narrative Discourse to Express Thoughts By Goal 1 (SLP) giving multiple definitions of a word;describing a picture;explaining a proverb or idiom;conversational task on a given topic;80%;with minimal cues (75-90%)  -MP giving multiple definitions of a word;describing a picture;explaining a proverb or idiom;conversational task on a given topic;80%;with minimal cues (75-90%)  -CJ giving multiple definitions of a word;describing a picture;explaining a proverb or idiom;conversational task on a given topic;80%;with minimal cues (75-90%)  -MP (r) LL (t) MP (c)    Time Frame (Connected Speech Goal 1, SLP) short term goal (STG)  -MP short term goal (STG)  -CJ short term goal (STG)  -MP (r) LL (t) MP (c)    Progress (Connected Speech Goal 1, SLP) 50%;60%;with minimal cues (75-90%)  -MP 50%;70%;with minimal cues (75-90%)  -CJ 0%;60%  -MP (r) LL (t) MP (c)    Progress/Outcomes (Connected Speech Goal 1, SLP) continuing progress toward goal  -MP continuing progress toward goal  -CJ continuing progress toward goal  -MP (r) LL (t) MP (c)    Comment (Connected Speech Goal 1, SLP) Multiple definitions, proverbs  -MP -- 0/2 proverbs/idiom, 3/5 multiple defintions  -MP (r) LL (t) MP (c)       Articulation Goal 1 (SLP)    Improve Articulation Goal 1 (SLP) -- -- by over-articulating at phrase level;by over-articulating in connected speech;80%;with minimal cues (75-90%)  -MP (r) LL (t) MP (c)    Time Frame (Articulation Goal 1, SLP) -- -- short term goal (STG)  -MP (r) LL (t) MP (c)    Progress (Articulation Goal 1, SLP) -- -- 100%;independently (over 90% accuracy)  -MP (r) LL (t) MP (c)    Progress/Outcomes (Articulation Goal 1, SLP) -- -- goal met  -MP (r) LL (t) MP (c)    Comment (Articulation Goal 1, SLP) -- -- 100% intelligbility to unfamiliar listener.  -MP (r) LL (t) MP (c)       Awareness of Basic Personal Information Goal 1 (SLP)    Improve Awareness of  Basic Personal Information Goal 1 (SLP) naming self, family members;answering open-ended questions regarding personal/biographical information;80%;with minimal cues (75-90%)  -MP naming self, family members;answering open-ended questions regarding personal/biographical information;80%;with minimal cues (75-90%)  -CJ naming self, family members;answering open-ended questions regarding personal/biographical information;80%;with minimal cues (75-90%)  -MP (r) LL (t) MP (c)    Time Frame (Awareness of Basic Personal Information Goal 1, SLP) short term goal (STG)  -MP short term goal (STG)  -CJ short term goal (STG)  -MP (r) LL (t) MP (c)    Progress (Awareness of Basic Personal Information Goal 1, SLP) -- 70%;with minimal cues (75-90%)  -CJ --    Progress/Outcomes (Awareness of Basic Personal Information Goal 1, SLP) goal ongoing  -MP continuing progress toward goal  -CJ goal ongoing  -MP (r) LL (t) MP (c)       Orientation Goal 1 ([facility])    Improve Orientation Through Goal 1 (SLP) -- -- demonstrating orientation to place;demonstrating orientation to disease/impairment;80%;with minimal cues (75-90%)  -MP (r) LL (t) MP (c)    Time Frame (Orientation Goal 1, SLP) -- -- short term goal (STG)  -MP (r) LL (t) MP (c)    Progress (Orientation Goal 1, SLP) -- -- 100%;independently (over 90% accuracy)  -MP (r) LL (t) MP (c)    Progress/Outcomes (Orientation Goal 1, SLP) -- -- goal met  -MP (r) LL (t) MP (c)    Row Name 02/27/23 1550             ([facility]) Patient will demonstrate functional swallow for    Diet Texture (Demonstrate functional swallow) regular textures  -      Liquid viscosity (Demonstrate functional swallow) thin liquids  -      [place] (Demonstrate functional swallow) independently (over 90% accuracy)  -      Time Frame (Demonstrate functional swallow) 1 week  -      Progress/Outcomes (Demonstrate functional swallow) goal met  -      Comment (Demonstrate functional swallow) No s/s of aspiration w/ thin  liquid, regular solid, or mixed consistency trials  -         (STG) Patient will tolerate trials of    Consistencies Trialed (Tolerate trials) regular textures;thin liquids  -      Desired Outcome (Tolerate trials) without signs/symptoms of aspiration  -      [place] (Tolerate trials) with minimal cues (75-90% accuracy)  -      Time Frame (Tolerate trials) 1 week  -      Progress/Outcomes (Tolerate trials) goal met  -MH         Patient will demonstrate functional speech skills for return to discharge environment    [place] with minimal cues  -MH      Time frame by discharge  -MH      Progress/Outcomes continuing progress toward goal  -MH         Patient will demonstrate functional language skills for return to discharge environment     [place] with minimal cues  -MH      Time frame by discharge  -MH      Progress/Outcomes continuing progress toward goal  -MH         Patient will demonstrate functional cognitive-linguistic skills for return to discharge environment    [place] with minimal cues  -MH      Time frame by discharge  -MH      Progress/Outcomes continuing progress toward goal  -MH         Connected Speech to Express Thoughts Goal 1 (SLP)    Improve Narrative Discourse to Express Thoughts By Goal 1 (SLP) giving multiple definitions of a word;describing a picture;explaining a proverb or idiom;conversational task on a given topic;80%;with minimal cues (75-90%)  -      Time Frame (Connected Speech Goal 1, SLP) short term goal (STG)  -MH      Progress (Connected Speech Goal 1, SLP) 70%;with minimal cues (75-90%)  -      Progress/Outcomes (Connected Speech Goal 1, SLP) continuing progress toward goal  -      Comment (Connected Speech Goal 1, SLP) Conversational task on given topic  -         Articulation Goal 1 (SLP)    Improve Articulation Goal 1 (SLP) by over-articulating at phrase level;by over-articulating in connected speech;80%;with minimal cues (75-90%)  -      Time  Frame (Articulation Goal 1, SLP) short term goal (STG)  -MH      Progress (Articulation Goal 1, SLP) 80%;with minimal cues (75-90%)  -MH      Progress/Outcomes (Articulation Goal 1, SLP) continuing progress toward goal  -MH         Awareness of Basic Personal Information Goal 1 (SLP)    Improve Awareness of Basic Personal Information Goal 1 (SLP) naming self, family members;answering open-ended questions regarding personal/biographical information;80%;with minimal cues (75-90%)  -MH      Time Frame (Awareness of Basic Personal Information Goal 1, SLP) short term goal (STG)  -MH      Progress (Awareness of Basic Personal Information Goal 1, SLP) 70%;with minimal cues (75-90%)  -MH      Progress/Outcomes (Awareness of Basic Personal Information Goal 1, SLP) continuing progress toward goal  -MH         Orientation Goal 1 (SLP)    Improve Orientation Through Goal 1 (SLP) demonstrating orientation to place;demonstrating orientation to disease/impairment;80%;with minimal cues (75-90%)  -MH      Time Frame (Orientation Goal 1, SLP) short term goal (STG)  -MH      Progress (Orientation Goal 1, SLP) 100%;with minimal cues (75-90%)  -MH      Progress/Outcomes (Orientation Goal 1, SLP) continuing progress toward goal  -MH      Comment (Orientation Goal 1, SLP) Oriented to place & impairment. F/u for carryover  -MH            User Key  (r) = Recorded By, (t) = Taken By, (c) = Cosigned By    Initials Name Provider Type    Mena Caceres, MS CCC-SLP Speech and Language Pathologist    Alexander Herrera MS CCC-SLP Speech and Language Pathologist    Estephanie Chou, MS, CFY-SLP Speech and Language Pathologist     Sonal Franz, Speech Therapy Student SLP Student                        Time Calculation:      Time Calculation- SLP     Row Name 03/02/23 1421             Time Calculation- SLP    SLP Start Time 1400  -MP      SLP Received On 03/02/23  -MP         Untimed Charges    [id number] Treatment/ST  Modification Prosth Aug Alter  25  -MP         Total Minutes    Untimed Charges Total Minutes 25  -MP       Total Minutes 25  -MP            User Key  (r) = Recorded By, (t) = Taken By, (c) = Cosigned By    Initials Name Provider Type    Alexander Herrera MS CCC-SLP Speech and Language Pathologist                Therapy Charges for Today     Code Description Service Date Service Provider Modifiers Qty    [id number]  ST TREATMENT SPEECH 2 3/2/2023 Alexander Olmstead MS CCC-SLP GN 1                     Alexander [place] MS CCC-IQRA  3/2/2023

## 2023-03-02 NOTE — PLAN OF CARE
Goal Outcome Evaluation:  Plan of Care Reviewed With: patient        Progress: improving  Outcome Evaluation: Patient A&O x4, up with stand-by assist, waiting for insurance approval for discharge. PRN pain medication given and effective. Left wrist brace in place.

## 2023-03-02 NOTE — PAYOR COMM NOTE
"Dorothy Garcia (77 y.o. Female)     Date of Birth   1945    Social Security Number       Address   [address] [place] [address]    Home Phone   [phone]    MRN   [id number]       Lutheran   Unknown    Marital Status                               Admission Date   23    Admission Type   Urgent    Admitting Provider   Sarah Valle DO    Attending Provider   Sarah Valle DO    Department, Room/Bed   [facility] 3E, S331/1       Discharge Date       Discharge Disposition       Discharge Destination                               Attending Provider: Sarah Valle DO    Allergies: Penicillins    Isolation: None   Infection: None   Code Status: CPR    Ht: 172.7 cm (67.99\")   Wt: 105 kg (231 lb 5 oz)    Admission Cmt: None   Principal Problem: Encephalopathy acute [G93.40]                 Active Insurance as of 2023     Primary Coverage     Payor Plan Insurance Group Employer/Plan Group    ANTHEM MEDICARE REPLACEMENT ANTHEM MEDICARE ADVANTAGE [id number]     Payor Plan Address Payor Plan Phone Number Payor Plan Fax Number Effective Dates    [address] [phone]  2023 - None Entered    [facility] [id number]       Subscriber Name Subscriber Birth Date Member ID       DOROTHY GARCIA 1945 [id number]                 Emergency Contacts      (Rel.) Home Phone Work Phone Mobile Phone    Asa Garcia (Son) -- -- [phone]    Yazan Chapman (Brother) -- -- [phone]               Physician Progress Notes (last 24 hours)      Sarah Valle DO at 23 1207              [facility] Medicine Services  PROGRESS NOTE    Patient Name: Dorothy Garcia  : 1945  MRN: [id number]    Date of Admission: 2023  Primary Care Physician: Provider, No Known    Subjective   Subjective     CC: AMS    HPI:   Patient resting in bed. States she continued to have diarrhea overnight and was given imodium this morning, which has " helped. Ongoing left wrist pain present.    ROS:  Gen- No fevers, chills  CV- No chest pain, palpitations  Resp- No cough, dyspnea  GI- No N/V, abd pain  +wrist pain  +diarrhea    Objective   Objective     Vital Signs:   Temp:  [98 °F (36.7 °C)-98.4 °F (36.9 °C)] 98.2 °F (36.8 °C)  Heart Rate:  [74-99] 93  Resp:  [16-18] 18  BP: (133-183)/(83-94) 133/83  Flow (L/min):  [2] 2     Physical Exam:  Constitutional: No acute distress, awake, alert  HENT: NCAT, mucous membranes moist  Respiratory: Clear to auscultation bilaterally, respiratory effort normal   Cardiovascular: RRR, no murmurs, rubs, or gallops  Gastrointestinal: Positive bowel sounds, soft, nontender, nondistended  Musculoskeletal: No bilateral ankle edema, left wrist in soft brace  Psychiatric: Appropriate affect, cooperative  Neurologic: Oriented x 3, strength symmetric in all extremities, Cranial Nerves grossly intact to confrontation, speech clear  Skin: No rashes      Results Reviewed:  LAB RESULTS:      Lab 03/01/23  0529 02/28/23  0659 02/27/23  1429 02/26/23  0624 02/25/23  0546 02/24/23  0534 02/23/23  2122   WBC 10.79 10.70 10.38 12.98* 13.61*   < >  --    HEMOGLOBIN 13.8 13.5 12.9 12.0 12.1   < >  --    HEMATOCRIT 43.5 42.3 40.5 37.9 39.1   < >  --    PLATELETS 264 245 235 238 223   < >  --    NEUTROS ABS  --   --  7.50* 10.56* 12.48*  --   --    IMMATURE GRANS (ABS)  --   --  0.03 0.11* 0.06*  --   --    LYMPHS ABS  --   --  1.79 1.30 0.59*  --   --    MONOS ABS  --   --  0.94* 0.99* 0.47  --   --    EOS ABS  --   --  0.10 0.01 0.00  --   --    MCV 94.0 91.8 93.3 95.2 96.1   < >  --    PROCALCITONIN  --   --   --   --   --   --  0.02   LACTATE  --   --   --   --   --   --  2.0    < > = values in this interval not displayed.         Lab 03/01/23  0529 02/28/23  0659 02/27/23  1429 02/26/23  1837 02/26/23  0623 02/25/23  1431 02/25/23  0551 02/24/23  0534 02/24/23  0533 02/23/23  1708   SODIUM 140 140 140  --  140  --  139  --  139 138   POTASSIUM  4.2 3.7 4.0  --  3.8 4.8 3.3*  --  3.9 4.4   CHLORIDE 105 103 103  --  106  --  107  --  102 101   CO2 24.0 27.0 30.0*  --  25.0  --  22.0  --  25.0 26.0   ANION GAP 11.0 10.0 7.0  --  9.0  --  10.0  --  12.0 11.0   BUN 11 9 9  --  11  --  12  --  9 6*   CREATININE 0.55* 0.52* 0.66  --  0.64  --  0.58  --  0.75 0.61   EGFR 95.7 97.0 91.6  --  92.3  --  94.5  --  83.1 93.4   GLUCOSE 97 96 97  --  98  --  132*  --  173* 126*   CALCIUM 8.5* 8.7 8.4*  --  7.9*  --  7.8*  --  8.3* 9.0   MAGNESIUM  --   --   --   --  2.2  --  1.4*  --  1.5*  --    PHOSPHORUS  --   --   --  2.4* 2.2* 1.9* 1.8*  --  3.0  --    HEMOGLOBIN A1C  --   --   --   --   --   --   --  5.40  --   --    TSH  --   --   --   --   --   --   --   --   --  2.570         Lab 03/01/23  0529 02/28/23  0659 02/23/23  1708   TOTAL PROTEIN 6.1 5.9* 7.2   ALBUMIN 3.8 3.5 4.2   GLOBULIN 2.3 2.4 3.0   ALT (SGPT) 22 19 18   AST (SGOT) 31 25 23   BILIRUBIN 0.3 0.3 0.3   ALK PHOS 80 68 70             Lab 02/24/23  0533   CHOLESTEROL 229*   LDL CHOL 150*   HDL CHOL 63*   TRIGLYCERIDES 90             Brief Urine Lab Results  (Last result in the past 365 days)      Color   Clarity   Blood   Leuk Est   Nitrite   Protein   CREAT   Urine HCG        02/24/23 1449 Yellow   Cloudy   Negative   Negative   Negative   Trace                 Microbiology Results Abnormal     Procedure Component Value - Date/Time    Culture, CSF - Cerebrospinal Fluid, Lumbar Puncture [[id number]] Collected: 02/27/23 1246    Lab Status: Preliminary result Specimen: Cerebrospinal Fluid from Lumbar Puncture Updated: 03/01/23 0946     CSF Culture No growth at 2 days     Gram Stain Rare (1+) WBCs seen      No organisms seen    Blood Culture - Blood, Hand, Right [[id number]]  (Normal) Collected: 02/23/23 2122    Lab Status: Final result Specimen: Blood from Hand, Right Updated: 02/28/23 2146     Blood Culture No growth at 5 days    Narrative:      Aerobic bottle only      Blood Culture - Blood, Hand, Left  [[id number]]  (Normal) Collected: 02/23/23 2122    Lab Status: Final result Specimen: Blood from Hand, Left Updated: 02/28/23 2146     Blood Culture No growth at 5 days    Narrative:      Aerobic bottle only      Meningitis / Encephalitis Panel, PCR - Cerebrospinal Fluid, Lumbar Puncture [[id number]]  (Normal) Collected: 02/27/23 1246    Lab Status: Final result Specimen: Cerebrospinal Fluid from Lumbar Puncture Updated: 02/27/23 1458     ESCHERICHIA COLI K1, PCR Not Detected     HAEMOPHILUS INFLUENZAE, PCR Not Detected     LISTERIA MONOCYTOGENES, PCR Not Detected     NEISSERIA MENINGITIDIS, PCR Not Detected     STREPTOCOCCUS AGALACTIAE, PCR Not Detected     STREPTOCOCCUS PNEUMONIAE, PCR Not Detected     CYTOMEGALOVIRUS (CMV), PCR Not Detected     ENTEROVIRUS, PCR Not Detected     HERPES SIMPLEX VIRUS 1 (HSV-1), PCR Not Detected     HERPES SIMPLEX VIRUS 2 (HSV-2), PCR Not Detected     HUMAN PARECHOVIRUS, PCR Not Detected     VARICELLA ZOSTER VIRUS (VZV), PCR Not Detected     CRYPTOCOCCUS NEOFORMANS / GATTII, PCR Not Detected     HUMAN HERPES VIRUS 6 PCR Not Detected    Cryptococcal AG, CSF - Cerebrospinal Fluid, Lumbar Puncture [[id number]]  (Normal) Collected: 02/27/23 1246    Lab Status: Final result Specimen: Cerebrospinal Fluid from Lumbar Puncture Updated: 02/27/23 1435     Cryptococcal Antigen, CSF Negative          IR LUMBAR PUNCTURE DIAGNOSTIC    Result Date: 3/1/2023  IR LUMBAR PUNCTURE DIAGNOSTIC Date of Exam: 2/27/2023 12:15 PM EST Indication: encephalopathy. Comparison: None available. Technique: A detailed explanation of the procedure, including the risks, benefits, and alternatives was provided.  A preprocedure timeout was performed. Procedure, risks, complications, and side effects of lumbar puncture were discussed and reviewed in detail with the patient including the possibility for bleeding, infection, needle damage to surrounding structures, and the possibility of a spinal headache. The patient  indicated understanding and consented to the procedure. The patient was placed in the prone position on the table. The back was prepped and draped in a sterile fashion. 1% lidocaine was used as a local anesthetic to the skin and subcutaneous tissues. Under fluoroscopic guidance a 22-gauge spinal needle was advanced at the L3-L4 level to the CSF space. Opening pressure was measured recorded at 14 cm H2O. Approximately 10 cc of clear colorless CSF was returned and collected in 4 numbered vials. The sample vials were labeled and sent to pathology for further analysis. Closing pressure was measured and recorded at 12 cm H2O. The needle was removed. The patient tolerated the procedure well, and no immediate complications occurred. Fluoroscopic Time: 12 seconds Number of images saved: 1 Findings: Opening pressure was 14 cm H2O. Closing pressure was 12 cm H2O.     Impression: Impression: Successful fluoroscopic-guided lumbar puncture as above. Opening pressure was 14 cm H2O. Closing pressure was 12 cm H2O. Electronically Signed: Ang Dorman  3/1/2023 3:09 AM EST  Workstation ID: [id number]      Results for orders placed during the hospital encounter of 02/23/23    Adult Transthoracic Echo Complete W/ Cont if Necessary Per Protocol (With Agitated Saline)    Interpretation Summary  •  Left ventricular systolic function is normal. Calculated left ventricular EF = 67.2%  •  Left ventricular diastolic function was normal.  •  Saline test results are negative for right to left atrial level shunt.  •  Estimated right ventricular systolic pressure from tricuspid regurgitation is normal (<35 mmHg).      Current medications:  Scheduled Meds:aspirin, 81 mg, Oral, Daily   Or  aspirin, 300 mg, Rectal, Daily  atorvastatin, 80 mg, Oral, Nightly  levothyroxine, 100 mcg, Oral, Q AM  melatonin, 10 mg, Oral, Nightly  pantoprazole, 40 mg, Oral, Q AM  sertraline, 50 mg, Oral, Daily  sodium chloride, 10 mL, Intravenous, Q12H      Continuous  Infusions:   PRN Meds:.•  hydrOXYzine  •  Magnesium Standard Dose Replacement - Initiate Nurse / BPA Driven Protocol  •  oxyCODONE-acetaminophen  •  Phosphorus Replacement - Initiate Nurse / BPA Driven Protocol  •  Potassium Replacement - Initiate Nurse / BPA Driven Protocol  •  sodium chloride  •  sodium chloride    Assessment & Plan   Assessment & Plan     Active Hospital Problems    Diagnosis  POA   • **Encephalopathy acute [G93.40]  Yes   • Suspected acute ischemic stroke  [I63.9]  Yes   • Hypertension [I10]  Yes   • Anxiety and depression [F41.9, F32.A]  Yes   • Hypothyroidism [E03.9]  Yes   • Arthritis [M19.90]  Yes   • COPD [J44.9]  Yes   • B-cell lymphoma  [C85.90]  Yes   • ? Subdural hematoma [S06.5XAA]  Yes   • Frequent falls [R29.6]  Not Applicable   • Left wrist fracture [S62.102A]  Yes   • Illicit drug use (UDS + opiates, BZDs, and TCAs) [F19.90]  Yes      Resolved Hospital Problems   No resolved problems to display.        Brief Hospital Course to date:  77 y.o. 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 Dukes in [facility]), current falls with numerous fractures, who presents to [facility] on 2/23/2023 was transferred from [facility] for evaluation of stroke.     Stroke imaging was negative.     Per report, history from son (who is apparently estranged from his mother) suggests that she has had declining neurologic status.     Patient started on antibiotics initially for empiric coverage of meningitis per neurology.  They no longer feels her symptoms are concerning for meningitis and their note states that it is okay to stop antibiotics, off antibiotics and observing     Encephalopathy is improved but neurology concerned for chronic meningitis. LP in IR planned for 2/27/23.    This patient's problems and plans were partially  entered by my partner and updated as appropriate by me 03/01/23.    Encephalopathy  - improved  - possible alcohol/medication withdrawal  - neurology concerned for chronic meningitis. LP per IR on 2/27/23 stable- culture negative at 48 hrs and meningitis/encephalitis panel negative  - PT/OT/SLP    Depression  - patient complaining of depression but states she no longer wants to take her Trazodone.  - plan to start Zoloft 50mg PO daily    Possible CVA/TIA  -asa/statin  -stroke team following    Hpyothyroidism  -synthroid    Lymphoma  -needs oncology follow up    Wrist fracture, L  -with recent cast/splint removal, ask orthopedic surgery if need to replace splint/cast  - seen by orthopedic surgery, Dr. Ramos-  Recommends patient return in 1 to 2 weeks to clinic for repeat radiographs.  Okay for weightbearing through elbow left upper extremity, otherwise nonweightbearing left upper extremity     Expected Discharge Location and Transportation: Rehab  Expected Discharge   Expected Discharge Date and Time     Expected Discharge Date Expected Discharge Time    Mar 3, 2023            DVT prophylaxis:  Mechanical DVT prophylaxis orders are present.     AM-PAC 6 Clicks Score (PT): 18 (02/28/23 1146)    CODE STATUS:   Code Status and Medical Interventions:   Ordered at: 02/23/23 1517     Code Status (Patient has no pulse and is not breathing):    CPR (Attempt to Resuscitate)     Medical Interventions (Patient has pulse or is breathing):    Full Support       Sarah Valle DO  03/01/23        Electronically signed by Sarah Valle DO at 03/01/23 9057

## 2023-03-03 LAB
ALBUMIN SERPL-MCNC: 3.9 G/DL (ref 3.5–5.2)
ALBUMIN/GLOB SERPL: 1.4 G/DL
ALP SERPL-CCNC: 83 U/L (ref 39–117)
ALT SERPL W P-5'-P-CCNC: 18 U/L (ref 1–33)
ANION GAP SERPL CALCULATED.3IONS-SCNC: 13 MMOL/L (ref 5–15)
AST SERPL-CCNC: 20 U/L (ref 1–32)
BILIRUB SERPL-MCNC: 0.5 MG/DL (ref 0–1.2)
BUN SERPL-MCNC: 15 MG/DL (ref 8–23)
BUN/CREAT SERPL: 23.4 (ref 7–25)
CALCIUM SPEC-SCNC: 9.4 MG/DL (ref 8.6–10.5)
CHLORIDE SERPL-SCNC: 101 MMOL/L (ref 98–107)
CO2 SERPL-SCNC: 24 MMOL/L (ref 22–29)
CREAT SERPL-MCNC: 0.64 MG/DL (ref 0.57–1)
DEPRECATED RDW RBC AUTO: 48.4 FL (ref 37–54)
EGFRCR SERPLBLD CKD-EPI 2021: 92.3 ML/MIN/1.73
ERYTHROCYTE [DISTWIDTH] IN BLOOD BY AUTOMATED COUNT: 14.5 % (ref 12.3–15.4)
GLOBULIN UR ELPH-MCNC: 2.7 GM/DL
GLUCOSE SERPL-MCNC: 98 MG/DL (ref 65–99)
HCT VFR BLD AUTO: 42.2 % (ref 34–46.6)
HGB BLD-MCNC: 13.4 G/DL (ref 12–15.9)
MCH RBC QN AUTO: 29.1 PG (ref 26.6–33)
MCHC RBC AUTO-ENTMCNC: 31.8 G/DL (ref 31.5–35.7)
MCV RBC AUTO: 91.7 FL (ref 79–97)
PLATELET # BLD AUTO: 298 10*3/MM3 (ref 140–450)
PMV BLD AUTO: 10.1 FL (ref 6–12)
POTASSIUM SERPL-SCNC: 4.5 MMOL/L (ref 3.5–5.2)
PROT SERPL-MCNC: 6.6 G/DL (ref 6–8.5)
RBC # BLD AUTO: 4.6 10*6/MM3 (ref 3.77–5.28)
SODIUM SERPL-SCNC: 138 MMOL/L (ref 136–145)
WBC NRBC COR # BLD: 11.16 10*3/MM3 (ref 3.4–10.8)

## 2023-03-03 PROCEDURE — 97530 THERAPEUTIC ACTIVITIES: CPT

## 2023-03-03 PROCEDURE — 99232 SBSQ HOSP IP/OBS MODERATE 35: CPT | Performed by: INTERNAL MEDICINE

## 2023-03-03 PROCEDURE — 80053 COMPREHEN METABOLIC PANEL: CPT | Performed by: HOSPITALIST

## 2023-03-03 PROCEDURE — 85027 COMPLETE CBC AUTOMATED: CPT | Performed by: HOSPITALIST

## 2023-03-03 RX ADMIN — OXYCODONE HYDROCHLORIDE AND ACETAMINOPHEN 1 TABLET: 5; 325 TABLET ORAL at 13:13

## 2023-03-03 RX ADMIN — ASPIRIN 81 MG CHEWABLE TABLET 81 MG: 81 TABLET CHEWABLE at 08:51

## 2023-03-03 RX ADMIN — Medication 10 ML: at 20:15

## 2023-03-03 RX ADMIN — PANTOPRAZOLE SODIUM 40 MG: 40 TABLET, DELAYED RELEASE ORAL at 06:02

## 2023-03-03 RX ADMIN — OXYCODONE HYDROCHLORIDE AND ACETAMINOPHEN 1 TABLET: 5; 325 TABLET ORAL at 08:51

## 2023-03-03 RX ADMIN — OXYCODONE HYDROCHLORIDE AND ACETAMINOPHEN 1 TABLET: 5; 325 TABLET ORAL at 18:26

## 2023-03-03 RX ADMIN — LEVOTHYROXINE SODIUM 100 MCG: 0.1 TABLET ORAL at 06:02

## 2023-03-03 RX ADMIN — HYDROXYZINE HYDROCHLORIDE 25 MG: 25 TABLET ORAL at 20:15

## 2023-03-03 RX ADMIN — ATORVASTATIN CALCIUM 80 MG: 40 TABLET, FILM COATED ORAL at 20:15

## 2023-03-03 RX ADMIN — Medication 10 ML: at 08:51

## 2023-03-03 RX ADMIN — SERTRALINE HYDROCHLORIDE 50 MG: 50 TABLET ORAL at 08:51

## 2023-03-03 RX ADMIN — Medication 10 MG: at 20:15

## 2023-03-03 NOTE — PROGRESS NOTES
Clinical Nutrition     Patient Name: Dorothy Zavala  YOB: 1945  MRN: [id number]  Date of Encounter: 23 10:16 EST  Admission date: 2023    Reason for Visit   LOS    EMR reviewed    Yes    Diet Nutrition Related History     Patient reports her appetite/intake is much better than when she first got here. Patient denies any known food allergies, denies any issues with chew/swallow, (SLP did a swallow eval on 23 and they determined a regular texture and thin liquids) and denies any N/V/D. Patient's last BM was yesterday. Patient states she is going to d/c to rehab today.     Current Nutrition Prescription    Diet: Cardiac Diets; Healthy Heart (2-3 Na+); Texture: Regular Texture (IDDSI 7); Fluid Consistency: Thin (IDDSI 0)    Average Intake from [place]% x last 4 meals (100% of dinner last night)    Actions:    Follow treatment progress, Care plan reviewed, Encourage intake    Monitor Per Protocol    Mirian Stanton,   Time Spent: 20 minutes

## 2023-03-03 NOTE — THERAPY TREATMENT NOTE
Patient Name: Dorothy Zavala  : 1945    MRN: [id number]                              Today's Date: 3/3/2023       Admit Date: 2023    Visit Dx:     ICD-10-CM ICD-9-CM   1. Cognitive communication deficit  R41.841 799.52   2. Dysphagia, unspecified type  R13.10 787.20     Patient Active Problem List   Diagnosis   • Suspected acute ischemic stroke    • Hypertension   • Anxiety and depression   • Hypothyroidism   • Arthritis   • COPD   • B-cell lymphoma    • ? Subdural hematoma   • Frequent falls   • Left wrist fracture   • Illicit drug use (UDS + opiates, BZDs, and TCAs)   • Encephalopathy acute     Past Medical History:   Diagnosis Date   • Anxiety and depression 2023   • Arthritis 2023   • B-cell lymphoma  2023   • COPD 2023   • Hypertension 2023   • Hypothyroidism 2023     History reviewed. No pertinent surgical history.   General Information     Row Name 23 1051          Physical Therapy Time and Intention    Document Type therapy note (daily note)  -KG     Mode of Treatment physical therapy  -KG     Row Name 23 1051          General Information    Existing Precautions/Restrictions fall;left;non-weight bearing;other (see comments)  NWB LUE  -KG     Row Name 23 1051          Cognition    Orientation Status (Cognition) oriented x 4  -KG     Row Name 23 1051          Safety Issues, Functional Mobility    Safety Issues Affecting Function (Mobility) safety precaution awareness;safety precautions follow-through/compliance  -KG     Impairments Affecting Function (Mobility) balance;endurance/activity tolerance;strength  -KG           User Key  (r) = Recorded By, (t) = Taken By, (c) = Cosigned By    Initials Name Provider Type    KG Shanelle Roach, PT Physical Therapist               Mobility     Row Name 23 1052          Bed Mobility    Bed Mobility supine-sit  -KG     Supine-Sit [place] (Bed Mobility) supervision  -KG     Assistive Device  (Bed Mobility) head of bed elevated  -KG     Comment, (Bed Mobility) Pt demonstrated appropriate sequencing.  -KG     Row Name 03/03/23 1052          Transfers    Comment, (Transfers) VC's for sequencing.  -KG     Row Name 03/03/23 1052          Sit-Stand Transfer    Sit-Stand [place] (Transfers) standby assist;verbal cues  -KG     Row Name 03/03/23 1052          Gait/Stairs (Locomotion)    [place] Level (Gait) contact guard;verbal cues  -KG     Assistive Device (Gait) cane, straight  -KG     Distance in Feet (Gait) 100  -KG     Deviations/Abnormal Patterns (Gait) [place] decreased;stride length decreased  -KG     Bilateral Gait Deviations forward flexed posture  -KG     Comment, (Gait/Stairs) Pt demonstrated step through gait pattern with slow [place] and decreased step length. Improved balance and stability this session. Pt's ambulation distance limited by c/o mild dizziness.  -KG     Row Name 03/03/23 1052          Mobility    Extremity Weight-bearing Status left upper extremity  -KG     Left Upper Extremity (Weight-bearing Status) non weight-bearing (NWB)  -KG           User Key  (r) = Recorded By, (t) = Taken By, (c) = Cosigned By    Initials Name Provider Type    KG Shanelle Roach, PT Physical Therapist               Obj/Interventions     Row Name 03/03/23 1053          Balance    Balance Assessment sitting static balance;standing static balance;standing dynamic balance  -KG     Static Sitting Balance independent  -KG     Position, Sitting Balance unsupported;sitting edge of bed  -KG     Static Standing Balance standby assist  -KG     Dynamic Standing Balance contact guard  -KG     Position/Device Used, Standing Balance supported;cane, straight  -KG           User Key  (r) = Recorded By, (t) = Taken By, (c) = Cosigned By    Initials Name Provider Type    KG Shanelle Roach, PT Physical Therapist               Goals/Plan    No documentation.                Clinical Impression     Row Name  03/03/23 1054          Pain    Pretreatment Pain Rating 0/10 - no pain  -KG     Posttreatment Pain Rating 0/10 - no pain  -KG     Row Name 03/03/23 1054          Plan of Care Review    Plan of Care Reviewed With patient  -KG     Progress improving  -KG     Outcome Evaluation Pt increased ambulation distance to 100ft with CGA and straight cane. Pt demonstrated improved balance and coordination this session. Limited by c/o mild dizziness. Pt able to maintain NWB status of L wrist. Denied any c/o pain. Continue to recommend PT skilled care.  -KG     Row Name 03/03/23 1054          Vital Signs    Pre Systolic BP Rehab 157  -KG     Pre Treatment Diastolic BP 89  -KG     Post Systolic BP Rehab 166  -KG     Post Treatment Diastolic   -KG     Pretreatment Heart Rate (beats/min) 86  -KG     Posttreatment Heart Rate (beats/min) 98  -KG     Pre SpO2 (%) 98  -KG     O2 Delivery Pre Treatment room air  -KG     Post SpO2 (%) 98  -KG     O2 Delivery Post Treatment room air  -KG     Pre Patient Position Supine  -KG     Intra Patient Position Standing  -KG     Post Patient Position Sitting  -KG     Row Name 03/03/23 1054          Positioning and Restraints    Pre-Treatment Position in bed  -KG     Post Treatment Position chair  -KG     In Chair notified nsg;reclined;call light within reach;encouraged to call for assist;exit alarm on;LUE elevated;legs elevated  -KG           User Key  (r) = Recorded By, (t) = Taken By, (c) = Cosigned By    Initials Name Provider Type    KG Shanelle Roach, PT Physical Therapist               Outcome Measures     Row Name 03/03/23 1055          How much help from another person do you currently need...    Turning from your back to your side while in flat bed without using bedrails? 4  -KG     Moving from lying on back to sitting on the side of a flat bed without bedrails? 3  -KG     Moving to and from a bed to a chair (including a wheelchair)? 3  -KG     Standing up from a chair using your  arms (e.g., wheelchair, bedside chair)? 3  -KG     Climbing 3-5 steps with a railing? 2  -KG     To walk in hospital room? 3  -KG     AM-PAC 6 Clicks Score (PT) 18  -KG     Highest level of mobility 6 --> Walked 10 steps or more  -KG     Row Name 03/03/23 1055          Functional Assessment    Outcome Measure Options AM-PAC 6 Clicks Basic Mobility (PT)  -KG           User Key  (r) = Recorded By, (t) = Taken By, (c) = Cosigned By    Initials Name Provider Type    KG Shanelle Roach, PT Physical Therapist                             Physical Therapy Education     Title: PT OT SLP Therapies (In Progress)     Topic: Physical Therapy (Done)     Point: Mobility training (Done)     Learning Progress Summary           Patient Acceptance, E, VU,NR by KG at 3/3/2023 0846    Acceptance, E, VU by SJ at 3/1/2023 1623    Acceptance, E, NR by AS at 2/27/2023 0958    Acceptance, E, VU by CM at 2/24/2023 1449   Family Acceptance, E, VU by CM at 2/24/2023 1449                   Point: Home exercise program (Done)     Learning Progress Summary           Patient Acceptance, E, VU,NR by KG at 3/3/2023 0846    Acceptance, E, VU by SJ at 3/1/2023 1623    Acceptance, E, NR by AS at 2/27/2023 0958                   Point: Body mechanics (Done)     Learning Progress Summary           Patient Acceptance, E, VU,NR by KG at 3/3/2023 0846    Acceptance, E, VU by SJ at 3/1/2023 1623    Acceptance, E, NR by AS at 2/27/2023 0958                   Point: Precautions (Done)     Learning Progress Summary           Patient Acceptance, E, VU,NR by KG at 3/3/2023 0846    Acceptance, E, VU by SJ at 3/1/2023 1623    Acceptance, E, NR by AS at 2/27/2023 0958    Acceptance, E, VU by CM at 2/24/2023 1449   Family Acceptance, E, VU by CM at 2/24/2023 1449                               User Key     Initials Effective Dates Name Provider Type Discipline     02/03/23 -  Alyssia Saini, PT Physical Therapist PT    AS 02/03/23 -  Danisha Rai, PTA  Physical Therapist Assistant PT    KG 05/22/20 -  Shanelle Roach PT Physical Therapist PT    CM 09/22/22 -  Kiana Davis PT Physical Therapist PT              PT Recommendation and Plan     Plan of Care Reviewed With: patient  Progress: improving  Outcome Evaluation: Pt increased ambulation distance to 100ft with CGA and straight cane. Pt demonstrated improved balance and coordination this session. Limited by c/o mild dizziness. Pt able to maintain NWB status of L wrist. Denied any c/o pain. Continue to recommend PT skilled care.     Time Calculation:    PT Charges     Row Name 03/03/23 0846             Time Calculation    Start Time 0846  -KG      PT Received On 03/03/23  -KG      PT Goal Re-Cert Due Date 03/06/23  -KG         Time Calculation- PT    Total Timed Code Minutes- PT 24 minute(s)  -KG         Timed Charges    34408 - PT Therapeutic Activity Minutes 24  -KG         Total Minutes    Timed Charges Total Minutes 24  -KG       Total Minutes 24  -KG            User Key  (r) = Recorded By, (t) = Taken By, (c) = Cosigned By    Initials Name Provider Type    KG Shanelle Roach, PT Physical Therapist              Therapy Charges for Today     Code Description Service Date Service Provider Modifiers Qty    56139272043 HC PT THERAPEUTIC ACT EA 15 MIN 3/3/2023 Shanelle Roach, PT GP 2          PT G-Codes  Outcome Measure Options: AM-PAC 6 Clicks Basic Mobility (PT)  AM-PAC 6 Clicks Score (PT): 18  AM-PAC 6 Clicks Score (OT): 16  Modified [place] Scale: 4 - Moderately severe disability.  Unable to walk without assistance, and unable to attend to own bodily needs without assistance.       Debo Roach PT  3/3/2023

## 2023-03-03 NOTE — CASE MANAGEMENT/SOCIAL WORK
Discharge Planning Assessment  [facility]     Patient Name: Dorothy Zavala  MRN: [id number]  Today's Date: 3/3/2023    Admit Date: 2/23/2023    Plan: [facility]                   Discharge Plan     Row Name 03/03/23 1635       Plan    Plan [facility]    Patient/Family in Agreement with Plan yes    Plan Comments Followed up with Ms. Zavala, at the bedside for discharge planning.    [facility] has hopefully initiated the prior auth with the patient's [employer] for the rehab admission.  Unable to reach Jacqueline at the facility, ph [phone].  Received call back from Aileen at [facility] [place], but she is not the liaison working on Ms. Zavala's admission.    Updated Ms. Zavala at the bedside and she continues to be agreeable to [facility] for short term rehab.    CM will follow up.    Final Discharge Disposition Code 03 - skilled nursing facility (SNF)              Continued Care and Services - Admitted Since 2/23/2023     Destination     Service Provider Request Status Selected Services Address Phone Fax Patient Preferred    [facility] [address] [facility] [address] [phone] --              Expected Discharge Date and Time     Expected Discharge Date Expected Discharge Time    Mar 6, 2023                    Marta Kiran RN

## 2023-03-03 NOTE — PROGRESS NOTES
[facility] Medicine Services  PROGRESS NOTE    Patient Name: Dorothy Zvaala  : 1945  MRN: [id number]    Date of Admission: 2023  Primary Care Physician: Provider, No Known    Subjective   Subjective     CC: f/u AMS    HPI: Up in bed. Says she is slowly getting back to normal. C/O mild left wrist pain.    ROS:  Gen- No fevers, chills  CV- No chest pain, palpitations  Resp- No cough, dyspnea  GI- No N/V/D, abd pain     Objective   Objective     Vital Signs:   Temp:  [98 °F (36.7 °C)-98.7 °F (37.1 °C)] 98 °F (36.7 °C)  Heart Rate:  [80-88] 80  Resp:  [16] 16  BP: (157-163)/(77-97) 157/89     Physical Exam:  Constitutional: No acute distress, awake, alert, elderly female  HENT: NCAT, mucous membranes moist  Respiratory: Clear to auscultation bilaterally, respiratory effort normal   Cardiovascular: RRR, no murmurs, rubs, or gallops  Gastrointestinal: Positive bowel sounds, soft, nontender, nondistended  Musculoskeletal: Left wrist splinted  Psychiatric: Appropriate affect, cooperative  Neurologic: Oriented x 3, strength symmetric in all extremities, Cranial Nerves grossly intact to confrontation, speech clear  Skin: No rashes    Results Reviewed:  LAB RESULTS:      Lab 23  0724 23  0600 23  0529 23  0659 23  1429 23  0624 23  0546   WBC 11.16* 11.09* 10.79 10.70 10.38 12.98* 13.61*   HEMOGLOBIN 13.4 13.8 13.8 13.5 12.9 12.0 12.1   HEMATOCRIT 42.2 43.6 43.5 42.3 40.5 37.9 39.1   PLATELETS 298 273 264 245 235 238 223   NEUTROS ABS  --   --   --   --  7.50* 10.56* 12.48*   IMMATURE GRANS (ABS)  --   --   --   --  0.03 0.11* 0.06*   LYMPHS ABS  --   --   --   --  1.79 1.30 0.59*   MONOS ABS  --   --   --   --  0.94* 0.99* 0.47   EOS ABS  --   --   --   --  0.10 0.01 0.00   MCV 91.7 92.8 94.0 91.8 93.3 95.2 96.1         Lab 23  0724 23  0600 23  0529 23  0659 23  1429 23  1837 23  0623 23  1431  02/25/23  0551   SODIUM 138 138 140 140 140  --  140  --  139   POTASSIUM 4.5 4.4 4.2 3.7 4.0  --  3.8 4.8 3.3*   CHLORIDE 101 102 105 103 103  --  106  --  107   CO2 24.0 27.0 24.0 27.0 30.0*  --  25.0  --  22.0   ANION GAP 13.0 9.0 11.0 10.0 7.0  --  9.0  --  10.0   BUN 15 14 11 9 9  --  11  --  12   CREATININE 0.64 0.65 0.55* 0.52* 0.66  --  0.64  --  0.58   EGFR 92.3 91.9 95.7 97.0 91.6  --  92.3  --  94.5   GLUCOSE 98 95 97 96 97  --  98  --  132*   CALCIUM 9.4 9.0 8.5* 8.7 8.4*  --  7.9*  --  7.8*   MAGNESIUM  --   --   --   --   --   --  2.2  --  1.4*   PHOSPHORUS  --   --   --   --   --  2.4* 2.2* 1.9* 1.8*         Lab 03/03/23  0724 03/02/23  0600 03/01/23  0529 02/28/23  0659   TOTAL PROTEIN 6.6 6.1 6.1 5.9*   ALBUMIN 3.9 3.7 3.8 3.5   GLOBULIN 2.7 2.4 2.3 2.4   ALT (SGPT) 18 22 22 19   AST (SGOT) 20 21 31 25   BILIRUBIN 0.5 0.4 0.3 0.3   ALK PHOS 83 78 80 68                     Brief Urine Lab Results  (Last result in the past 365 days)      Color   Clarity   Blood   Leuk Est   Nitrite   Protein   CREAT   Urine HCG        02/24/23 1449 Yellow   Cloudy   Negative   Negative   Negative   Trace                 Microbiology Results Abnormal     Procedure Component Value - Date/Time    Culture, CSF - Cerebrospinal Fluid, Lumbar Puncture [[id number]] Collected: 02/27/23 1246    Lab Status: Final result Specimen: Cerebrospinal Fluid from Lumbar Puncture Updated: 03/02/23 1008     CSF Culture No growth at 3 days     Gram Stain Rare (1+) WBCs seen      No organisms seen    Blood Culture - Blood, Hand, Right [[id number]]  (Normal) Collected: 02/23/23 2122    Lab Status: Final result Specimen: Blood from Hand, Right Updated: 02/28/23 2146     Blood Culture No growth at 5 days    Narrative:      Aerobic bottle only      Blood Culture - Blood, Hand, Left [[id number]]  (Normal) Collected: 02/23/23 2122    Lab Status: Final result Specimen: Blood from Hand, Left Updated: 02/28/23 2146     Blood Culture No growth at 5 days     Narrative:      Aerobic bottle only      Meningitis / Encephalitis Panel, PCR - Cerebrospinal Fluid, Lumbar Puncture [[id number]]  (Normal) Collected: 02/27/23 1246    Lab Status: Final result Specimen: Cerebrospinal Fluid from Lumbar Puncture Updated: 02/27/23 1458     ESCHERICHIA COLI K1, PCR Not Detected     HAEMOPHILUS INFLUENZAE, PCR Not Detected     LISTERIA MONOCYTOGENES, PCR Not Detected     NEISSERIA MENINGITIDIS, PCR Not Detected     STREPTOCOCCUS AGALACTIAE, PCR Not Detected     STREPTOCOCCUS PNEUMONIAE, PCR Not Detected     CYTOMEGALOVIRUS (CMV), PCR Not Detected     ENTEROVIRUS, PCR Not Detected     HERPES SIMPLEX VIRUS 1 (HSV-1), PCR Not Detected     HERPES SIMPLEX VIRUS 2 (HSV-2), PCR Not Detected     HUMAN PARECHOVIRUS, PCR Not Detected     VARICELLA ZOSTER VIRUS (VZV), PCR Not Detected     CRYPTOCOCCUS NEOFORMANS / GATTII, PCR Not Detected     HUMAN HERPES VIRUS 6 PCR Not Detected    Cryptococcal AG, CSF - Cerebrospinal Fluid, Lumbar Puncture [[id number]]  (Normal) Collected: 02/27/23 1246    Lab Status: Final result Specimen: Cerebrospinal Fluid from Lumbar Puncture Updated: 02/27/23 1435     Cryptococcal Antigen, CSF Negative          No radiology results from the last 24 hrs    Results for orders placed during the hospital encounter of 02/23/23    Adult Transthoracic Echo Complete W/ Cont if Necessary Per Protocol (With Agitated Saline)    Interpretation Summary  •  Left ventricular systolic function is normal. Calculated left ventricular EF = 67.2%  •  Left ventricular diastolic function was normal.  •  Saline test results are negative for right to left atrial level shunt.  •  Estimated right ventricular systolic pressure from tricuspid regurgitation is normal (<35 mmHg).      Current medications:  Scheduled Meds:aspirin, 81 mg, Oral, Daily   Or  aspirin, 300 mg, Rectal, Daily  atorvastatin, 80 mg, Oral, Nightly  levothyroxine, 100 mcg, Oral, Q AM  melatonin, 10 mg, Oral, Nightly  pantoprazole,  40 mg, Oral, Q AM  sertraline, 50 mg, Oral, Daily  sodium chloride, 10 mL, Intravenous, Q12H      Continuous Infusions:   PRN Meds:.•  hydrOXYzine  •  Magnesium Standard Dose Replacement - Initiate Nurse / BPA Driven Protocol  •  oxyCODONE-acetaminophen  •  Phosphorus Replacement - Initiate Nurse / BPA Driven Protocol  •  Potassium Replacement - Initiate Nurse / BPA Driven Protocol  •  sodium chloride  •  sodium chloride    Assessment & Plan   Assessment & Plan     Active Hospital Problems    Diagnosis  POA   • **Encephalopathy acute [G93.40]  Yes   • Suspected acute ischemic stroke  [I63.9]  Yes   • Hypertension [I10]  Yes   • Anxiety and depression [F41.9, F32.A]  Yes   • Hypothyroidism [E03.9]  Yes   • Arthritis [M19.90]  Yes   • COPD [J44.9]  Yes   • B-cell lymphoma  [C85.90]  Yes   • ? Subdural hematoma [S06.5XAA]  Yes   • Frequent falls [R29.6]  Not Applicable   • Left wrist fracture [S62.102A]  Yes   • Illicit drug use (UDS + opiates, BZDs, and TCAs) [F19.90]  Yes      Resolved Hospital Problems   No resolved problems to display.        Brief Hospital Course to date:  77 y.o. 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 Dukes in [facility]), current falls with numerous fractures, who presents to [facility] on 2/23/2023 was transferred from [facility] for evaluation of stroke.     Stroke imaging was negative.     Per report, history from son (who is apparently estranged from his mother) suggests that she has had declining neurologic status.     Patient started on antibiotics initially for empiric coverage of meningitis per neurology.  They no longer feels her symptoms are concerning for meningitis and their note states that it is okay to stop antibiotics, off antibiotics and observing     Encephalopathy is improved but neurology concerned  for chronic meningitis. LP in IR planned for 2/27/23.     This patient's problems and plans were partially entered by my partner and updated as appropriate by me 03/03/23.     Encephalopathy  - Improved. Possible alcohol/medication withdrawal.  - Neurology concerned for chronic meningitis. LP per IR on 2/27/23 stable- culture negative at 48 hrs and meningitis/encephalitis panel negative  - PT/OT/SLP     Depression  - Patient complaining of depression but states she no longer wants to take her Trazodone.  - Has been started on Zoloft 50mg PO daily, increase as tolerated as outpatient.     CVA/TIA r/o  -Continue asa/statin     Hpyothyroidism  -synthroid     Lymphoma  -needs oncology follow up     Wrist fracture, L  -Seen by orthopedic surgery, Dr. Ramos-  Recommends patient return in 1 to 2 weeks to clinic for repeat radiographs.  Okay for weightbearing through elbow left upper extremity, otherwise nonweightbearing left upper extremity     Expected Discharge Location and Transportation: [facility]  Expected Discharge   Expected Discharge Date and Time     Expected Discharge Date Expected Discharge Time    Mar 3, 2023            DVT prophylaxis:  Mechanical DVT prophylaxis orders are present.     AM-PAC 6 Clicks Score (PT): 21 (03/02/23 0820)    CODE STATUS:   Code Status and Medical Interventions:   Ordered at: 02/23/23 0118     Code Status (Patient has no pulse and is not breathing):    CPR (Attempt to Resuscitate)     Medical Interventions (Patient has pulse or is breathing):    Full Support       Tegan Up II, DO  03/03/23

## 2023-03-03 NOTE — PLAN OF CARE
Goal Outcome Evaluation:  Plan of Care Reviewed With: patient        Progress: improving  Outcome Evaluation: Pt increased ambulation distance to 100ft with CGA and straight cane. Pt demonstrated improved balance and coordination this session. Limited by c/o mild dizziness. Pt able to maintain NWB status of L wrist. Denied any c/o pain. Continue to recommend PT skilled care.

## 2023-03-04 LAB — GLUCOSE BLDC GLUCOMTR-MCNC: 98 MG/DL (ref 70–130)

## 2023-03-04 PROCEDURE — 99232 SBSQ HOSP IP/OBS MODERATE 35: CPT | Performed by: INTERNAL MEDICINE

## 2023-03-04 PROCEDURE — 82962 GLUCOSE BLOOD TEST: CPT

## 2023-03-04 RX ORDER — AMLODIPINE BESYLATE 5 MG/1
5 TABLET ORAL
Status: DISCONTINUED | OUTPATIENT
Start: 2023-03-04 | End: 2023-03-11

## 2023-03-04 RX ORDER — OXYCODONE HYDROCHLORIDE AND ACETAMINOPHEN 5; 325 MG/1; MG/1
1 TABLET ORAL EVERY 4 HOURS PRN
Status: DISPENSED | OUTPATIENT
Start: 2023-03-04 | End: 2023-03-11

## 2023-03-04 RX ORDER — HYDROXYZINE HYDROCHLORIDE 25 MG/1
25 TABLET, FILM COATED ORAL ONCE
Status: COMPLETED | OUTPATIENT
Start: 2023-03-04 | End: 2023-03-04

## 2023-03-04 RX ADMIN — PANTOPRAZOLE SODIUM 40 MG: 40 TABLET, DELAYED RELEASE ORAL at 05:57

## 2023-03-04 RX ADMIN — ASPIRIN 81 MG CHEWABLE TABLET 81 MG: 81 TABLET CHEWABLE at 08:33

## 2023-03-04 RX ADMIN — LEVOTHYROXINE SODIUM 100 MCG: 0.1 TABLET ORAL at 05:57

## 2023-03-04 RX ADMIN — OXYCODONE HYDROCHLORIDE AND ACETAMINOPHEN 1 TABLET: 5; 325 TABLET ORAL at 17:56

## 2023-03-04 RX ADMIN — SERTRALINE HYDROCHLORIDE 50 MG: 50 TABLET ORAL at 08:33

## 2023-03-04 RX ADMIN — Medication 10 MG: at 20:29

## 2023-03-04 RX ADMIN — HYDROXYZINE HYDROCHLORIDE 25 MG: 25 TABLET ORAL at 20:30

## 2023-03-04 RX ADMIN — HYDROXYZINE HYDROCHLORIDE 25 MG: 25 TABLET ORAL at 11:35

## 2023-03-04 RX ADMIN — AMLODIPINE BESYLATE 5 MG: 5 TABLET ORAL at 08:33

## 2023-03-04 RX ADMIN — Medication 10 ML: at 20:31

## 2023-03-04 RX ADMIN — OXYCODONE HYDROCHLORIDE AND ACETAMINOPHEN 1 TABLET: 5; 325 TABLET ORAL at 08:33

## 2023-03-04 RX ADMIN — Medication 10 ML: at 08:34

## 2023-03-04 RX ADMIN — ATORVASTATIN CALCIUM 80 MG: 40 TABLET, FILM COATED ORAL at 20:29

## 2023-03-04 RX ADMIN — OXYCODONE HYDROCHLORIDE AND ACETAMINOPHEN 1 TABLET: 5; 325 TABLET ORAL at 12:50

## 2023-03-04 NOTE — PROGRESS NOTES
[facility] Medicine Services  PROGRESS NOTE    Patient Name: Dorothy Zavala  : 1945  MRN: [id number]    Date of Admission: 2023  Primary Care Physician: Provider, No Known    Subjective   Subjective     CC: f/u AMS    HPI: Had a good night. No concerns from nursing. Feels fairly well. Still with ongoing pain in left wrist.    ROS:  Gen- No fevers, chills  CV- No chest pain, palpitations  Resp- No cough, dyspnea  GI- No N/V/D, abd pain     Objective   Objective     Vital Signs:   Temp:  [97.4 °F (36.3 °C)-97.9 °F (36.6 °C)] 97.9 °F (36.6 °C)  Heart Rate:  [78-90] 90  Resp:  [16-20] 18  BP: (150-193)/(77-90) 150/79     Physical Exam:  Constitutional: No acute distress, awake, alert, elderly female, chronically ill appearing  HENT: NCAT, mucous membranes moist  Respiratory: Clear to auscultation bilaterally, respiratory effort normal   Cardiovascular: RRR, no murmurs, rubs, or gallops  Gastrointestinal: Positive bowel sounds, soft, nontender, nondistended, obese  Musculoskeletal: Left wrist splinted.  Psychiatric: Appropriate affect, cooperative  Neurologic: Oriented x 3, strength symmetric in all extremities, Cranial Nerves grossly intact to confrontation, speech clear  Skin: No rashes    Results Reviewed:  LAB RESULTS:      Lab 23  0724 23  0600 23  0529 23  0659 23  1429 23  0624   WBC 11.16* 11.09* 10.79 10.70 10.38 12.98*   HEMOGLOBIN 13.4 13.8 13.8 13.5 12.9 12.0   HEMATOCRIT 42.2 43.6 43.5 42.3 40.5 37.9   PLATELETS 298 273 264 245 235 238   NEUTROS ABS  --   --   --   --  7.50* 10.56*   IMMATURE GRANS (ABS)  --   --   --   --  0.03 0.11*   LYMPHS ABS  --   --   --   --  1.79 1.30   MONOS ABS  --   --   --   --  0.94* 0.99*   EOS ABS  --   --   --   --  0.10 0.01   MCV 91.7 92.8 94.0 91.8 93.3 95.2         Lab 23  0724 23  0600 23  0529 23  0659 23  1429 23  1837 23  0623 23  1431  02/25/23  1431   SODIUM 138 138 140 140 140  --  140   < >  --    POTASSIUM 4.5 4.4 4.2 3.7 4.0  --  3.8  --  4.8   CHLORIDE 101 102 105 103 103  --  106   < >  --    CO2 24.0 27.0 24.0 27.0 30.0*  --  25.0   < >  --    ANION GAP 13.0 9.0 11.0 10.0 7.0  --  9.0   < >  --    BUN 15 14 11 9 9  --  11   < >  --    CREATININE 0.64 0.65 0.55* 0.52* 0.66  --  0.64   < >  --    EGFR 92.3 91.9 95.7 97.0 91.6  --  92.3   < >  --    GLUCOSE 98 95 97 96 97  --  98   < >  --    CALCIUM 9.4 9.0 8.5* 8.7 8.4*  --  7.9*   < >  --    MAGNESIUM  --   --   --   --   --   --  2.2  --   --    PHOSPHORUS  --   --   --   --   --  2.4* 2.2*  --  1.9*    < > = values in this interval not displayed.         Lab 03/03/23  0724 03/02/23  0600 03/01/23  0529 02/28/23  0659   TOTAL PROTEIN 6.6 6.1 6.1 5.9*   ALBUMIN 3.9 3.7 3.8 3.5   GLOBULIN 2.7 2.4 2.3 2.4   ALT (SGPT) 18 22 22 19   AST (SGOT) 20 21 31 25   BILIRUBIN 0.5 0.4 0.3 0.3   ALK PHOS 83 78 80 68                     Brief Urine Lab Results  (Last result in the past 365 days)      Color   Clarity   Blood   Leuk Est   Nitrite   Protein   CREAT   Urine HCG        02/24/23 1449 Yellow   Cloudy   Negative   Negative   Negative   Trace                 Microbiology Results Abnormal     Procedure Component Value - Date/Time    Culture, CSF - Cerebrospinal Fluid, Lumbar Puncture [[id number]] Collected: 02/27/23 1246    Lab Status: Final result Specimen: Cerebrospinal Fluid from Lumbar Puncture Updated: 03/02/23 1008     CSF Culture No growth at 3 days     Gram Stain Rare (1+) WBCs seen      No organisms seen    Blood Culture - Blood, Hand, Right [[id number]]  (Normal) Collected: 02/23/23 2122    Lab Status: Final result Specimen: Blood from Hand, Right Updated: 02/28/23 2146     Blood Culture No growth at 5 days    Narrative:      Aerobic bottle only      Blood Culture - Blood, Hand, Left [[id number]]  (Normal) Collected: 02/23/23 2122    Lab Status: Final result Specimen: Blood from Hand, Left  Updated: 02/28/23 2146     Blood Culture No growth at 5 days    Narrative:      Aerobic bottle only      Meningitis / Encephalitis Panel, PCR - Cerebrospinal Fluid, Lumbar Puncture [[id number]]  (Normal) Collected: 02/27/23 1246    Lab Status: Final result Specimen: Cerebrospinal Fluid from Lumbar Puncture Updated: 02/27/23 1458     ESCHERICHIA COLI K1, PCR Not Detected     HAEMOPHILUS INFLUENZAE, PCR Not Detected     LISTERIA MONOCYTOGENES, PCR Not Detected     NEISSERIA MENINGITIDIS, PCR Not Detected     STREPTOCOCCUS AGALACTIAE, PCR Not Detected     STREPTOCOCCUS PNEUMONIAE, PCR Not Detected     CYTOMEGALOVIRUS (CMV), PCR Not Detected     ENTEROVIRUS, PCR Not Detected     HERPES SIMPLEX VIRUS 1 (HSV-1), PCR Not Detected     HERPES SIMPLEX VIRUS 2 (HSV-2), PCR Not Detected     HUMAN PARECHOVIRUS, PCR Not Detected     VARICELLA ZOSTER VIRUS (VZV), PCR Not Detected     CRYPTOCOCCUS NEOFORMANS / GATTII, PCR Not Detected     HUMAN HERPES VIRUS 6 PCR Not Detected    Cryptococcal AG, CSF - Cerebrospinal Fluid, Lumbar Puncture [[id number]]  (Normal) Collected: 02/27/23 1246    Lab Status: Final result Specimen: Cerebrospinal Fluid from Lumbar Puncture Updated: 02/27/23 1435     Cryptococcal Antigen, CSF Negative          No radiology results from the last 24 hrs    Results for orders placed during the hospital encounter of 02/23/23    Adult Transthoracic Echo Complete W/ Cont if Necessary Per Protocol (With Agitated Saline)    Interpretation Summary  •  Left ventricular systolic function is normal. Calculated left ventricular EF = 67.2%  •  Left ventricular diastolic function was normal.  •  Saline test results are negative for right to left atrial level shunt.  •  Estimated right ventricular systolic pressure from tricuspid regurgitation is normal (<35 mmHg).      Current medications:  Scheduled Meds:amLODIPine, 5 mg, Oral, Q24H  aspirin, 81 mg, Oral, Daily   Or  aspirin, 300 mg, Rectal, Daily  atorvastatin, 80 mg, Oral,  Nightly  levothyroxine, 100 mcg, Oral, Q AM  melatonin, 10 mg, Oral, Nightly  pantoprazole, 40 mg, Oral, Q AM  sertraline, 50 mg, Oral, Daily  sodium chloride, 10 mL, Intravenous, Q12H      Continuous Infusions:   PRN Meds:.•  hydrOXYzine  •  Magnesium Standard Dose Replacement - Initiate Nurse / BPA Driven Protocol  •  Phosphorus Replacement - Initiate Nurse / BPA Driven Protocol  •  Potassium Replacement - Initiate Nurse / BPA Driven Protocol  •  sodium chloride  •  sodium chloride    Assessment & Plan   Assessment & Plan     Active Hospital Problems    Diagnosis  POA   • **Encephalopathy acute [G93.40]  Yes   • Suspected acute ischemic stroke  [I63.9]  Yes   • Hypertension [I10]  Yes   • Anxiety and depression [F41.9, F32.A]  Yes   • Hypothyroidism [E03.9]  Yes   • Arthritis [M19.90]  Yes   • COPD [J44.9]  Yes   • B-cell lymphoma  [C85.90]  Yes   • ? Subdural hematoma [S06.5XAA]  Yes   • Frequent falls [R29.6]  Not Applicable   • Left wrist fracture [S62.102A]  Yes   • Illicit drug use (UDS + opiates, BZDs, and TCAs) [F19.90]  Yes      Resolved Hospital Problems   No resolved problems to display.        Brief Hospital Course to date:  Dorothy Zavala is a 77 y.o. 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 Dukes in [facility]), current falls with numerous fractures, who presents to [facility] on 2/23/2023 was transferred from [facility] for evaluation of stroke.Stroke imaging was negative. Per report, history from son (who is apparently estranged from his mother) suggests that she has had declining neurologic status. Patient started on antibiotics initially for empiric coverage of meningitis per neurology.  They no longer feels her symptoms are concerning for meningitis and their note states that it is okay to stop antibiotics, off antibiotics  and observing. Encephalopathy is improved but neurology concerned for chronic meningitis. LP in IR planned for 2/27/23.     This patient's problems and plans were partially entered by my partner and updated as appropriate by me 03/04/23.     Encephalopathy  - Improved. Possible alcohol/medication withdrawal.  - Neurology concerned for chronic meningitis. LP per IR on 2/27/23 stable- culture negative at 48 hrs and meningitis/encephalitis panel negative. Improving off abx.  - PT/OT/SLP, referrals pending.     Depression  - Patient complaining of depression but states she no longer wants to take her Trazodone.  - Has been started on Zoloft 50mg PO daily, increase as tolerated as outpatient.    HTN  - Start amlodipine.     CVA/TIA ruled out.  - Continue asa/statin     Hypothyroidism  - Continue synthroid     Lymphoma  - Needs oncology follow up     Wrist fracture, L  - Seen by orthopedic surgery, Dr. Ramos-  Recommends patient return in 1 to 2 weeks to clinic for repeat radiographs.  Okay for weightbearing through elbow left upper extremity, otherwise nonweightbearing left upper extremity    Spoke with her son Asa @ 0930, all questions answered. Updated on POC, medical readiness for rehab.      Expected Discharge Location and Transportation:   Expected Discharge   Expected Discharge Date and Time     Expected Discharge Date Expected Discharge Time    Mar 6, 2023            DVT prophylaxis:  Mechanical DVT prophylaxis orders are present.     AM-PAC 6 Clicks Score (PT): 18 (03/03/23 0295)    CODE STATUS:   Code Status and Medical Interventions:   Ordered at: 02/23/23 8052     Code Status (Patient has no pulse and is not breathing):    CPR (Attempt to Resuscitate)     Medical Interventions (Patient has pulse or is breathing):    Full Support       Tegan Up II, DO  03/04/23

## 2023-03-05 PROCEDURE — 99232 SBSQ HOSP IP/OBS MODERATE 35: CPT | Performed by: INTERNAL MEDICINE

## 2023-03-05 RX ADMIN — OXYCODONE HYDROCHLORIDE AND ACETAMINOPHEN 1 TABLET: 5; 325 TABLET ORAL at 20:27

## 2023-03-05 RX ADMIN — Medication 10 MG: at 20:27

## 2023-03-05 RX ADMIN — Medication 10 ML: at 20:28

## 2023-03-05 RX ADMIN — LEVOTHYROXINE SODIUM 100 MCG: 0.1 TABLET ORAL at 06:22

## 2023-03-05 RX ADMIN — OXYCODONE HYDROCHLORIDE AND ACETAMINOPHEN 1 TABLET: 5; 325 TABLET ORAL at 08:08

## 2023-03-05 RX ADMIN — ATORVASTATIN CALCIUM 80 MG: 40 TABLET, FILM COATED ORAL at 20:28

## 2023-03-05 RX ADMIN — ASPIRIN 81 MG CHEWABLE TABLET 81 MG: 81 TABLET CHEWABLE at 08:03

## 2023-03-05 RX ADMIN — PANTOPRAZOLE SODIUM 40 MG: 40 TABLET, DELAYED RELEASE ORAL at 06:22

## 2023-03-05 RX ADMIN — HYDROXYZINE HYDROCHLORIDE 25 MG: 25 TABLET ORAL at 20:28

## 2023-03-05 RX ADMIN — AMLODIPINE BESYLATE 5 MG: 5 TABLET ORAL at 08:03

## 2023-03-05 RX ADMIN — SERTRALINE HYDROCHLORIDE 50 MG: 50 TABLET ORAL at 08:03

## 2023-03-05 RX ADMIN — OXYCODONE HYDROCHLORIDE AND ACETAMINOPHEN 1 TABLET: 5; 325 TABLET ORAL at 12:08

## 2023-03-05 RX ADMIN — Medication 10 ML: at 08:04

## 2023-03-05 NOTE — PROGRESS NOTES
[facility] Medicine Services  PROGRESS NOTE    Patient Name: Dorothy Zavala  : 1945  MRN: [id number]    Date of Admission: 2023  Primary Care Physician: Provider, No Known    Subjective   Subjective     CC: f/u AMS    HPI: Up walking into bathroom. Slowly feeling better. Eating fairly well.    ROS:  Gen- No fevers, chills  CV- No chest pain, palpitations  Resp- No cough, dyspnea  GI- No N/V/D, abd pain     Objective   Objective     Vital Signs:   Temp:  [98 °F (36.7 °C)-99 °F (37.2 °C)] 98.3 °F (36.8 °C)  Heart Rate:  [] 94  Resp:  [16-18] 18  BP: (130-168)/(72-96) 158/75     Physical Exam:  Constitutional: No acute distress, awake, alert, looking better  HENT: NCAT, mucous membranes moist  Respiratory: Clear to auscultation bilaterally, respiratory effort normal   Cardiovascular: RRR, no murmurs, rubs, or gallops  Gastrointestinal: Positive bowel sounds, soft, nontender, nondistended  Musculoskeletal: No bilateral ankle edema  Psychiatric: Appropriate affect, cooperative  Neurologic: Oriented x 3, strength symmetric in all extremities, Cranial Nerves grossly intact to confrontation, speech clear  Skin: No rashes    Results Reviewed:  LAB RESULTS:      Lab 23  0659 23  1429   WBC 11.16* 11.09* 10.79 10.70 10.38   HEMOGLOBIN 13.4 13.8 13.8 13.5 12.9   HEMATOCRIT 42.2 43.6 43.5 42.3 40.5   PLATELETS 298 273 264 245 235   NEUTROS ABS  --   --   --   --  7.50*   IMMATURE GRANS (ABS)  --   --   --   --  0.03   LYMPHS ABS  --   --   --   --  1.79   MONOS ABS  --   --   --   --  0.94*   EOS ABS  --   --   --   --  0.10   MCV 91.7 92.8 94.0 91.8 93.3         Lab 23  0723  0523  0659 23  1429 23  1837   SODIUM 138 138 140 140 140  --    POTASSIUM 4.5 4.4 4.2 3.7 4.0  --    CHLORIDE 101 102 105 103 103  --    CO2 24.0 27.0 24.0 27.0 30.0*  --    ANION GAP 13.0 9.0 11.0 10.0 7.0   --    BUN 15 14 11 9 9  --    CREATININE 0.64 0.65 0.55* 0.52* 0.66  --    EGFR 92.3 91.9 95.7 97.0 91.6  --    GLUCOSE 98 95 97 96 97  --    CALCIUM 9.4 9.0 8.5* 8.7 8.4*  --    PHOSPHORUS  --   --   --   --   --  2.4*         Lab 03/03/23  0724 03/02/23  0600 03/01/23  0529 02/28/23  0659   TOTAL PROTEIN 6.6 6.1 6.1 5.9*   ALBUMIN 3.9 3.7 3.8 3.5   GLOBULIN 2.7 2.4 2.3 2.4   ALT (SGPT) 18 22 22 19   AST (SGOT) 20 21 31 25   BILIRUBIN 0.5 0.4 0.3 0.3   ALK PHOS 83 78 80 68                     Brief Urine Lab Results  (Last result in the past 365 days)      Color   Clarity   Blood   Leuk Est   Nitrite   Protein   CREAT   Urine HCG        02/24/23 1449 Yellow   Cloudy   Negative   Negative   Negative   Trace                 Microbiology Results Abnormal     Procedure Component Value - Date/Time    Fungus Culture - Cerebrospinal Fluid, Lumbar Puncture [[id number]]  (Normal) Collected: 02/27/23 1246    Lab Status: Preliminary result Specimen: Cerebrospinal Fluid from Lumbar Puncture Updated: 03/04/23 1345     Fungus Culture No fungus isolated at less than 1 week    Culture, CSF - Cerebrospinal Fluid, Lumbar Puncture [[id number]] Collected: 02/27/23 1246    Lab Status: Final result Specimen: Cerebrospinal Fluid from Lumbar Puncture Updated: 03/02/23 1008     CSF Culture No growth at 3 days     Gram Stain Rare (1+) WBCs seen      No organisms seen    Blood Culture - Blood, Hand, Right [[id number]]  (Normal) Collected: 02/23/23 2122    Lab Status: Final result Specimen: Blood from Hand, Right Updated: 02/28/23 2146     Blood Culture No growth at 5 days    Narrative:      Aerobic bottle only      Blood Culture - Blood, Hand, Left [[id number]]  (Normal) Collected: 02/23/23 2122    Lab Status: Final result Specimen: Blood from Hand, Left Updated: 02/28/23 2146     Blood Culture No growth at 5 days    Narrative:      Aerobic bottle only      Meningitis / Encephalitis Panel, PCR - Cerebrospinal Fluid, Lumbar Puncture [[id number]]   (Normal) Collected: 02/27/23 1246    Lab Status: Final result Specimen: Cerebrospinal Fluid from Lumbar Puncture Updated: 02/27/23 1458     ESCHERICHIA COLI K1, PCR Not Detected     HAEMOPHILUS INFLUENZAE, PCR Not Detected     LISTERIA MONOCYTOGENES, PCR Not Detected     NEISSERIA MENINGITIDIS, PCR Not Detected     STREPTOCOCCUS AGALACTIAE, PCR Not Detected     STREPTOCOCCUS PNEUMONIAE, PCR Not Detected     CYTOMEGALOVIRUS (CMV), PCR Not Detected     ENTEROVIRUS, PCR Not Detected     HERPES SIMPLEX VIRUS 1 (HSV-1), PCR Not Detected     HERPES SIMPLEX VIRUS 2 (HSV-2), PCR Not Detected     HUMAN PARECHOVIRUS, PCR Not Detected     VARICELLA ZOSTER VIRUS (VZV), PCR Not Detected     CRYPTOCOCCUS NEOFORMANS / GATTII, PCR Not Detected     HUMAN HERPES VIRUS 6 PCR Not Detected    Cryptococcal AG, CSF - Cerebrospinal Fluid, Lumbar Puncture [[id number]]  (Normal) Collected: 02/27/23 1246    Lab Status: Final result Specimen: Cerebrospinal Fluid from Lumbar Puncture Updated: 02/27/23 1435     Cryptococcal Antigen, CSF Negative          No radiology results from the last 24 hrs    Results for orders placed during the hospital encounter of 02/23/23    Adult Transthoracic Echo Complete W/ Cont if Necessary Per Protocol (With Agitated Saline)    Interpretation Summary  •  Left ventricular systolic function is normal. Calculated left ventricular EF = 67.2%  •  Left ventricular diastolic function was normal.  •  Saline test results are negative for right to left atrial level shunt.  •  Estimated right ventricular systolic pressure from tricuspid regurgitation is normal (<35 mmHg).      Current medications:  Scheduled Meds:amLODIPine, 5 mg, Oral, Q24H  aspirin, 81 mg, Oral, Daily   Or  aspirin, 300 mg, Rectal, Daily  atorvastatin, 80 mg, Oral, Nightly  levothyroxine, 100 mcg, Oral, Q AM  melatonin, 10 mg, Oral, Nightly  pantoprazole, 40 mg, Oral, Q AM  sertraline, 50 mg, Oral, Daily  sodium chloride, 10 mL, Intravenous,  Q12H      Continuous Infusions:   PRN Meds:.•  hydrOXYzine  •  Magnesium Standard Dose Replacement - Initiate Nurse / BPA Driven Protocol  •  oxyCODONE-acetaminophen  •  Phosphorus Replacement - Initiate Nurse / BPA Driven Protocol  •  Potassium Replacement - Initiate Nurse / BPA Driven Protocol  •  sodium chloride  •  sodium chloride    Assessment & Plan   Assessment & Plan     Active Hospital Problems    Diagnosis  POA   • **Encephalopathy acute [G93.40]  Yes   • Suspected acute ischemic stroke  [I63.9]  Yes   • Hypertension [I10]  Yes   • Anxiety and depression [F41.9, F32.A]  Yes   • Hypothyroidism [E03.9]  Yes   • Arthritis [M19.90]  Yes   • COPD [J44.9]  Yes   • B-cell lymphoma  [C85.90]  Yes   • ? Subdural hematoma [S06.5XAA]  Yes   • Frequent falls [R29.6]  Not Applicable   • Left wrist fracture [S62.102A]  Yes   • Illicit drug use (UDS + opiates, BZDs, and TCAs) [F19.90]  Yes      Resolved Hospital Problems   No resolved problems to display.        Brief Hospital Course to date:  Dorothy Zavala is a 77 y.o. 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 Dukes in [facility]), current falls with numerous fractures, who presents to [facility] on 2/23/2023 was transferred from [facility] for evaluation of stroke.Stroke imaging was negative. Per report, history from son (who is apparently estranged from his mother) suggests that she has had declining neurologic status. Patient started on antibiotics initially for empiric coverage of meningitis per neurology.  They no longer feels her symptoms are concerning for meningitis and their note states that it is okay to stop antibiotics, off antibiotics and observing. Encephalopathy is improved but neurology concerned for chronic meningitis. LP in IR completed which was negative.     This patient's  problems and plans were partially entered by my partner and updated as appropriate by me 03/05/23.    Encephalopathy  - Improved. Possible alcohol/medication withdrawal.  - Neurology concerned for chronic meningitis. LP per IR on 2/27/23 stable- culture negative at 48 hrs and meningitis/encephalitis panel negative. Improving off abx.  - PT/OT/SLP, referrals pending.     Depression  - Patient complaining of depression but states she no longer wants to take her Trazodone. Long d/w her son who thinks depression is playing a large role in her decline particularly due to recent passing of her brother and recent loss of her house.   - Has been started on Zoloft 50mg PO daily, increase as tolerated as outpatient.     HTN  - Better, started amlodipine.      CVA/TIA ruled out.  - Continue asa/statin     Hypothyroidism  - Continue synthroid     Lymphoma  - Needs oncology follow up     Wrist fracture, L  - Seen by orthopedic surgery, Dr. Ramos-  Recommends patient return in 1 to 2 weeks to clinic for repeat radiographs.  Okay for weightbearing through elbow left upper extremity, otherwise nonweightbearing left upper extremity     Have spoken with her son Asa, all questions answered. Updated on POC, medical readiness for rehab.      Expected Discharge Location and Transportation:   Expected Discharge   Expected Discharge Date and Time     Expected Discharge Date Expected Discharge Time    Mar 6, 2023            DVT prophylaxis:  Mechanical DVT prophylaxis orders are present.     AM-PAC 6 Clicks Score (PT): 23 (03/04/23 0011)    CODE STATUS:   Code Status and Medical Interventions:   Ordered at: 02/23/23 1517     Code Status (Patient has no pulse and is not breathing):    CPR (Attempt to Resuscitate)     Medical Interventions (Patient has pulse or is breathing):    Full Support       Tegan Up II, DO  03/05/23

## 2023-03-05 NOTE — PLAN OF CARE
Goal Outcome Evaluation:  Plan of Care Reviewed With: patient        Progress: improving  Outcome Evaluation: a/o x 4; VSS, RA; stand by assist; pleasant; PICC line dressing clean and intact; no changes to report from overnight; will continue POC    Problem: Skin Injury Risk Increased  Goal: Skin Health and Integrity  Intervention: Optimize Skin Protection  Recent Flowsheet Documentation  Taken 3/5/2023 0400 by Kevin Agee RN  Pressure Reduction Techniques:   frequent weight shift encouraged   pressure points protected  Head of Bed (HOB) Positioning: [facility] elevated  Pressure Reduction Devices: pressure-redistributing mattress utilized  Skin Protection:   adhesive use limited   incontinence pads utilized   tubing/devices free from skin contact   transparent dressing maintained  Taken 3/5/2023 0200 by Kevin Agee RN  Pressure Reduction Techniques:   pressure points protected   frequent weight shift encouraged  Head of Bed (HOB) Positioning: [facility] elevated  Pressure Reduction Devices:   pressure-redistributing mattress utilized   positioning supports utilized  Skin Protection:   adhesive use limited   incontinence pads utilized   tubing/devices free from skin contact   transparent dressing maintained   skin-to-device areas padded  Taken 3/5/2023 0000 by Kevin Agee RN  Pressure Reduction Techniques: frequent weight shift encouraged  Head of Bed (HOB) Positioning: [facility] elevated  Pressure Reduction Devices:   pressure-redistributing mattress utilized   positioning supports utilized  Skin Protection:   adhesive use limited   incontinence pads utilized   transparent dressing maintained   tubing/devices free from skin contact  Taken 3/4/2023 2200 by Kevin Agee RN  Pressure Reduction Techniques:   frequent weight shift encouraged   pressure points protected  Head of Bed (HOB) Positioning: [facility] elevated  Pressure Reduction Devices:   pressure-redistributing mattress utilized   positioning supports  utilized  Skin Protection:   adhesive use limited   incontinence pads utilized   tubing/devices free from skin contact   transparent dressing maintained  Taken 3/4/2023 1918 by Kevin Agee, RN  Pressure Reduction Techniques:   frequent weight shift encouraged   pressure points protected  Head of Bed (HOB) Positioning: [facility] elevated  Pressure Reduction Devices:   pressure-redistributing mattress utilized   positioning supports utilized  Skin Protection:   adhesive use limited   incontinence pads utilized   transparent dressing maintained   tubing/devices free from skin contact

## 2023-03-06 PROCEDURE — 92507 TX SP LANG VOICE COMM INDIV: CPT

## 2023-03-06 PROCEDURE — 97530 THERAPEUTIC ACTIVITIES: CPT

## 2023-03-06 PROCEDURE — 97116 GAIT TRAINING THERAPY: CPT

## 2023-03-06 PROCEDURE — 99232 SBSQ HOSP IP/OBS MODERATE 35: CPT | Performed by: INTERNAL MEDICINE

## 2023-03-06 PROCEDURE — 97110 THERAPEUTIC EXERCISES: CPT

## 2023-03-06 RX ADMIN — PANTOPRAZOLE SODIUM 40 MG: 40 TABLET, DELAYED RELEASE ORAL at 05:25

## 2023-03-06 RX ADMIN — OXYCODONE HYDROCHLORIDE AND ACETAMINOPHEN 1 TABLET: 5; 325 TABLET ORAL at 20:08

## 2023-03-06 RX ADMIN — OXYCODONE HYDROCHLORIDE AND ACETAMINOPHEN 1 TABLET: 5; 325 TABLET ORAL at 14:05

## 2023-03-06 RX ADMIN — Medication 10 ML: at 20:09

## 2023-03-06 RX ADMIN — SERTRALINE HYDROCHLORIDE 50 MG: 50 TABLET ORAL at 08:29

## 2023-03-06 RX ADMIN — Medication 10 MG: at 20:09

## 2023-03-06 RX ADMIN — LEVOTHYROXINE SODIUM 100 MCG: 0.1 TABLET ORAL at 05:25

## 2023-03-06 RX ADMIN — OXYCODONE HYDROCHLORIDE AND ACETAMINOPHEN 1 TABLET: 5; 325 TABLET ORAL at 09:41

## 2023-03-06 RX ADMIN — ATORVASTATIN CALCIUM 80 MG: 40 TABLET, FILM COATED ORAL at 20:08

## 2023-03-06 RX ADMIN — ASPIRIN 81 MG CHEWABLE TABLET 81 MG: 81 TABLET CHEWABLE at 08:29

## 2023-03-06 RX ADMIN — Medication 10 ML: at 08:30

## 2023-03-06 NOTE — CASE MANAGEMENT/SOCIAL WORK
Discharge Planning Assessment  [facility]     Patient Name: Dorothy Zavala  MRN: [id number]  Today's Date: 3/6/2023    Admit Date: 2/23/2023    Plan: [facility]   Discharge Needs Assessment    No documentation.                Discharge Plan     Row Name 03/06/23 1541       Plan    Plan [facility] SNF vs home with home health    Patient/Family in Agreement with Plan yes    Plan Comments Followed up with Jacqueline at [facility] regarding Ms. Zavala's rehab referral.  Jacqueline states that the facility has not yet accepted Ms. Zavala due to her history of drug use.  She is reviewing Ms. Zavala and will hopefully be able to accept and initiate the precert with Ms. Zavala's [employer].    CM will follow up.    Final Discharge Disposition Code 03 - skilled nursing facility (SNF)              Continued Care and Services - Admitted Since 2/23/2023     Destination     Service Provider Request Status Selected Services Address Phone Fax Patient Preferred    [facility] [address] [place] [address] [phone] --              Expected Discharge Date and Time     Expected Discharge Date Expected Discharge Time    Mar 8, 2023                    Marta Kiran, CHULA

## 2023-03-06 NOTE — THERAPY TREATMENT NOTE
Patient Name: Dorothy Zavala  : 1945    MRN: [id number]                              Today's Date: 3/6/2023       Admit Date: 2023    Visit Dx:     ICD-10-CM ICD-9-CM   1. Cognitive communication deficit  R41.841 799.52   2. Dysphagia, unspecified type  R13.10 787.20     Patient Active Problem List   Diagnosis   • Suspected acute ischemic stroke    • Hypertension   • Anxiety and depression   • Hypothyroidism   • Arthritis   • COPD   • B-cell lymphoma    • ? Subdural hematoma   • Frequent falls   • Left wrist fracture   • Illicit drug use (UDS + opiates, BZDs, and TCAs)   • Encephalopathy acute     Past Medical History:   Diagnosis Date   • Anxiety and depression 2023   • Arthritis 2023   • B-cell lymphoma  2023   • COPD 2023   • Hypertension 2023   • Hypothyroidism 2023     History reviewed. No pertinent surgical history.   General Information     Row Name 23 1418          Physical Therapy Time and Intention    Document Type therapy note (daily note)  -KW     Mode of Treatment physical therapy  -KW     Row Name 23 1418          General Information    Patient Profile Reviewed yes  -KW     Existing Precautions/Restrictions fall;left;non-weight bearing;other (see comments)  NWB L LE  -KW     Row Name 23 1418          Safety Issues, Functional Mobility    Impairments Affecting Function (Mobility) balance;endurance/activity tolerance;strength  -KW           User Key  (r) = Recorded By, (t) = Taken By, (c) = Cosigned By    Initials Name Provider Type    KW Hailey Zimmerman PT Physical Therapist               Mobility     Row Name 23 1418          Bed Mobility    Bed Mobility supine-sit  -KW     Rolling Left [place] (Bed Mobility) verbal cues;contact guard;1 person assist  -KW     Supine-Sit [place] (Bed Mobility) supervision  -KW     Row Name 23 1418          Sit-Stand Transfer    Sit-Stand [place] (Transfers) standby assist;verbal  cues  -KW     Assistive Device (Sit-Stand Transfers) cane, straight  -KW     Row Name 03/06/23 1418          Gait/Stairs (Locomotion)    [place] Level (Gait) contact guard;verbal cues  -KW     Assistive Device (Gait) cane, straight  -KW     Deviations/Abnormal Patterns (Gait) [place] decreased;stride length decreased;gait speed decreased  -KW     Bilateral Gait Deviations forward flexed posture  -KW     Comment, (Gait/Stairs) Pt demonstrated step through gait pattern with slow [place] and decreased step length, mild unsteadiness this date.  -KW     Row Name 03/06/23 1418          Mobility    Extremity Weight-bearing Status left upper extremity  -KW     Left Upper Extremity (Weight-bearing Status) non weight-bearing (NWB)  -KW           User Key  (r) = Recorded By, (t) = Taken By, (c) = Cosigned By    Initials Name Provider Type    Hailey Ríos, PT Physical Therapist               Obj/Interventions    No documentation.                Goals/Plan     Row Name 03/06/23 1418          Bed Mobility Goal 1 (PT)    Activity/Assistive Device (Bed Mobility Goal 1, PT) sit to supine;supine to sit  -KW     [place] Level/Cues Needed (Bed Mobility Goal 1, PT) supervision required  -KW     Time Frame (Bed Mobility Goal 1, PT) long term goal (LTG);10 days  -KW     Progress/Outcomes (Bed Mobility Goal 1, PT) goal met  -KW     Row Name 03/06/23 1418          Transfer Goal 1 (PT)    Activity/Assistive Device (Transfer Goal 1, PT) sit-to-stand/stand-to-sit;bed-to-chair/chair-to-bed  -KW     [place] Level/Cues Needed (Transfer Goal 1, PT) supervision required  -KW     Time Frame (Transfer Goal 1, PT) long term goal (LTG);10 days  -KW     Progress/Outcome (Transfer Goal 1, PT) good progress toward goal  -KW     Row Name 03/06/23 1418          Gait Training Goal 1 (PT)    Activity/Assistive Device (Gait Training Goal 1, PT) gait (walking locomotion)  -KW     [place] Level (Gait Training Goal 1, PT) modified  independence  -KW     Distance (Gait Training Goal 1, PT) 250'  -KW     Time Frame (Gait Training Goal 1, PT) long term goal (LTG);10 days  -KW     Strategies/Barriers (Gait Training Goal 1, PT) no LOB  -KW     Progress/Outcome (Gait Training Goal 1, PT) goal revised this date  -KW           User Key  (r) = Recorded By, (t) = Taken By, (c) = Cosigned By    Initials Name Provider Type    Hailey Ríos PT Physical Therapist               Clinical Impression     Row Name 03/06/23 1418          Pain    Pretreatment Pain Rating 0/10 - no pain  -KW     Row Name 03/06/23 1418          Plan of Care Review    Plan of Care Reviewed With patient  -KW     Progress improving  -KW     Outcome Evaluation Patient demonstrating improvement in ambulation distance today. She demonstrates mild unsteadiness with SOA during ambulation. She returned to room and agreeable to sit up in chair and completed LE exercises. Patient transfered to bathroom with supervision. Patient continues to present below baseline and would benefit from continued skilled PT services.  -KW     Row Name 03/06/23 1418          Vital Signs    Pre Systolic BP Rehab 140  -KW     Pre Treatment Diastolic BP 78  -KW     Row Name 03/06/23 1418          Positioning and Restraints    Pre-Treatment Position in bed  -KW     Post Treatment Position chair  -KW     In Chair reclined;call light within reach;encouraged to call for assist;exit alarm on  -KW           User Key  (r) = Recorded By, (t) = Taken By, (c) = Cosigned By    Initials Name Provider Type    Hailey Ríos PT Physical Therapist               Outcome Measures     Row Name 03/06/23 1418 03/06/23 0800       How much help from another person do you currently need...    Turning from your back to your side while in flat bed without using bedrails? 4  -KW 4  -DW    Moving from lying on back to sitting on the side of a flat bed without bedrails? 4  -KW 4  -DW    Moving to and from a bed to a chair  (including a wheelchair)? 3  -KW 4  -DW    Standing up from a chair using your arms (e.g., wheelchair, bedside chair)? 3  -KW 4  -DW    Climbing 3-5 steps with a railing? 3  -KW 4  -DW    To walk in hospital room? 3  -KW 3  -DW    AM-PAC 6 Clicks Score (PT) 20  -KW 23  -DW    Highest level of mobility 6 --> Walked 10 steps or more  -KW 7 --> Walked 25 feet or more  -DW    Row Name 03/06/23 1418          Functional Assessment    Outcome Measure Options AM-PAC 6 Clicks Basic Mobility (PT)  -KW           User Key  (r) = Recorded By, (t) = Taken By, (c) = Cosigned By    Initials Name Provider Type    Esme Ngo, RN Registered Nurse    Hailey Ríos, PT Physical Therapist                             Physical Therapy Education     Title: PT OT SLP Therapies (In Progress)     Topic: Physical Therapy (Done)     Point: Mobility training (Done)     Learning Progress Summary           Patient Acceptance, E, VU,NR by KG at 3/3/2023 0846    Acceptance, E, VU by SJ at 3/1/2023 1623    Acceptance, E, NR by AS at 2/27/2023 0958    Acceptance, E, VU by CM at 2/24/2023 1449   Family Acceptance, E, VU by CM at 2/24/2023 1449                   Point: Home exercise program (Done)     Learning Progress Summary           Patient Acceptance, E, VU,NR by KG at 3/3/2023 0846    Acceptance, E, VU by SJ at 3/1/2023 1623    Acceptance, E, NR by AS at 2/27/2023 0958                   Point: Body mechanics (Done)     Learning Progress Summary           Patient Acceptance, E, VU,NR by KG at 3/3/2023 0846    Acceptance, E, VU by SJ at 3/1/2023 1623    Acceptance, E, NR by AS at 2/27/2023 0958                   Point: Precautions (Done)     Learning Progress Summary           Patient Acceptance, E, VU,NR by KG at 3/3/2023 0846    Acceptance, E, VU by SJ at 3/1/2023 1623    Acceptance, E, NR by AS at 2/27/2023 0958    Acceptance, E, VU by CM at 2/24/2023 1448   Family Acceptance, E, VU by CM at 2/24/2023 0167                                User Key     Initials Effective Dates Name Provider Type Discipline    SJ 02/03/23 -  Alyssia Saini, PT Physical Therapist PT    AS 02/03/23 -  Danisha Rai, PTA Physical Therapist Assistant PT    KG 05/22/20 -  Shanelle Roach, PT Physical Therapist PT    CM 09/22/22 -  Kiana Davis, JULIUS Physical Therapist PT              PT Recommendation and Plan     Plan of Care Reviewed With: patient  Progress: improving  Outcome Evaluation: Patient demonstrating improvement in ambulation distance today. She demonstrates mild unsteadiness with SOA during ambulation. She returned to room and agreeable to sit up in chair and completed LE exercises. Patient transfered to bathroom with supervision. Patient continues to present below baseline and would benefit from continued skilled PT services.     Time Calculation:    PT Charges     Row Name 03/06/23 1418             Time Calculation    Start Time 1418  -KW      PT Received On 03/06/23  -KW      PT Goal Re-Cert Due Date 03/16/23  -KW         Timed Charges    51792 - PT Therapeutic Exercise Minutes 12  -KW      57623 - Gait Training Minutes  16  -KW      93613 - PT Therapeutic Activity Minutes 11  -KW         Total Minutes    Timed Charges Total Minutes 39  -KW       Total Minutes 39  -KW            User Key  (r) = Recorded By, (t) = Taken By, (c) = Cosigned By    Initials Name Provider Type    KW Hailey Zimmerman, JULIUS Physical Therapist              Therapy Charges for Today     Code Description Service Date Service Provider Modifiers Qty    40951562371 HC PT THERAPEUTIC ACT EA 15 MIN 3/6/2023 Hailey Zimmerman, PT GP 1    96799275757 HC PT THER PROC EA 15 MIN 3/6/2023 Hailey Zimmerman PT GP 1    72189241539 HC GAIT TRAINING EA 15 MIN 3/6/2023 Hailey Zimmerman, PT GP 1          PT G-Codes  Outcome Measure Options: AM-PAC 6 Clicks Basic Mobility (PT)  AM-PAC 6 Clicks Score (PT): 20  AM-PAC 6 Clicks Score (OT): 16  Modified Sloan Scale: 4  - Moderately severe disability.  Unable to walk without assistance, and unable to attend to own bodily needs without assistance.       Hailey Zimmerman, PT  3/6/2023

## 2023-03-06 NOTE — PLAN OF CARE
Goal Outcome Evaluation:  Plan of Care Reviewed With: (P) patient        Progress: (P) improving  SLP treatment completed. Will continue to address cog-comm. Please see note for further details and recommendations.

## 2023-03-06 NOTE — PLAN OF CARE
Goal Outcome Evaluation:  Plan of Care Reviewed With: patient        Progress: improving  Outcome Evaluation: Patient demonstrating improvement in ambulation distance today. She demonstrates mild unsteadiness with SOA during ambulation. She returned to room and agreeable to sit up in chair and completed LE exercises. Patient transfered to bathroom with supervision. Patient continues to present below baseline and would benefit from continued skilled PT services.

## 2023-03-06 NOTE — PROGRESS NOTES
[facility] Medicine Services  PROGRESS NOTE    Patient Name: Dorothy Zavala  : 1945  MRN: [id number]    Date of Admission: 2023  Primary Care Physician: Provider, No Known    Subjective   Subjective     CC: f/u AMS    HPI: Slept well overnight. Refusing antihypertensives because she says they make her feel bad. Eating fair.    ROS:  Gen- No fevers, chills  CV- No chest pain, palpitations  Resp- No cough, dyspnea  GI- No N/V/D, abd pain     Objective   Objective     Vital Signs:   Temp:  [98 °F (36.7 °C)-98.4 °F (36.9 °C)] 98 °F (36.7 °C)  Heart Rate:  [76-97] 95  Resp:  [18] 18  BP: (139-153)/(71-87) 152/73  Flow (L/min):  [1] 1     Physical Exam:  Constitutional: No acute distress, comfortable in bed  HENT: NCAT, mucous membranes moist  Respiratory: Clear to auscultation bilaterally, respiratory effort normal   Cardiovascular: RRR, no murmurs, rubs, or gallops  Gastrointestinal: Positive bowel sounds, soft, nontender, nondistended  Musculoskeletal: No bilateral ankle edema  Psychiatric: Appropriate affect, cooperative  Neurologic: Oriented x 3, strength symmetric in all extremities, Cranial Nerves grossly intact to confrontation, speech clear  Skin: No rashes    Results Reviewed:  LAB RESULTS:      Lab 23  0724 23  0600 23  0529 23  0659 23  1429   WBC 11.16* 11.09* 10.79 10.70 10.38   HEMOGLOBIN 13.4 13.8 13.8 13.5 12.9   HEMATOCRIT 42.2 43.6 43.5 42.3 40.5   PLATELETS 298 273 264 245 235   NEUTROS ABS  --   --   --   --  7.50*   IMMATURE GRANS (ABS)  --   --   --   --  0.03   LYMPHS ABS  --   --   --   --  1.79   MONOS ABS  --   --   --   --  0.94*   EOS ABS  --   --   --   --  0.10   MCV 91.7 92.8 94.0 91.8 93.3         Lab 23  0724 23  0600 23  0529 23  0659 23  1429   SODIUM 138 138 140 140 140   POTASSIUM 4.5 4.4 4.2 3.7 4.0   CHLORIDE 101 102 105 103 103   CO2 24.0 27.0 24.0 27.0 30.0*   ANION GAP 13.0 9.0 11.0  10.0 7.0   BUN 15 14 11 9 9   CREATININE 0.64 0.65 0.55* 0.52* 0.66   EGFR 92.3 91.9 95.7 97.0 91.6   GLUCOSE 98 95 97 96 97   CALCIUM 9.4 9.0 8.5* 8.7 8.4*         Lab 03/03/23  0724 03/02/23  0600 03/01/23  0529 02/28/23  0659   TOTAL PROTEIN 6.6 6.1 6.1 5.9*   ALBUMIN 3.9 3.7 3.8 3.5   GLOBULIN 2.7 2.4 2.3 2.4   ALT (SGPT) 18 22 22 19   AST (SGOT) 20 21 31 25   BILIRUBIN 0.5 0.4 0.3 0.3   ALK PHOS 83 78 80 68                     Brief Urine Lab Results  (Last result in the past 365 days)      Color   Clarity   Blood   Leuk Est   Nitrite   Protein   CREAT   Urine HCG        02/24/23 1449 Yellow   Cloudy   Negative   Negative   Negative   Trace                 Microbiology Results Abnormal     Procedure Component Value - Date/Time    Fungus Culture - Cerebrospinal Fluid, Lumbar Puncture [[id number]]  (Normal) Collected: 02/27/23 1246    Lab Status: Preliminary result Specimen: Cerebrospinal Fluid from Lumbar Puncture Updated: 03/04/23 1345     Fungus Culture No fungus isolated at less than 1 week    Culture, CSF - Cerebrospinal Fluid, Lumbar Puncture [[id number]] Collected: 02/27/23 1246    Lab Status: Final result Specimen: Cerebrospinal Fluid from Lumbar Puncture Updated: 03/02/23 1008     CSF Culture No growth at 3 days     Gram Stain Rare (1+) WBCs seen      No organisms seen    Blood Culture - Blood, Hand, Right [[id number]]  (Normal) Collected: 02/23/23 2122    Lab Status: Final result Specimen: Blood from Hand, Right Updated: 02/28/23 2146     Blood Culture No growth at 5 days    Narrative:      Aerobic bottle only      Blood Culture - Blood, Hand, Left [[id number]]  (Normal) Collected: 02/23/23 2122    Lab Status: Final result Specimen: Blood from Hand, Left Updated: 02/28/23 2146     Blood Culture No growth at 5 days    Narrative:      Aerobic bottle only      Meningitis / Encephalitis Panel, PCR - Cerebrospinal Fluid, Lumbar Puncture [[id number]]  (Normal) Collected: 02/27/23 1246    Lab Status: Final result  Specimen: Cerebrospinal Fluid from Lumbar Puncture Updated: 02/27/23 1458     ESCHERICHIA COLI K1, PCR Not Detected     HAEMOPHILUS INFLUENZAE, PCR Not Detected     LISTERIA MONOCYTOGENES, PCR Not Detected     NEISSERIA MENINGITIDIS, PCR Not Detected     STREPTOCOCCUS AGALACTIAE, PCR Not Detected     STREPTOCOCCUS PNEUMONIAE, PCR Not Detected     CYTOMEGALOVIRUS (CMV), PCR Not Detected     ENTEROVIRUS, PCR Not Detected     HERPES SIMPLEX VIRUS 1 (HSV-1), PCR Not Detected     HERPES SIMPLEX VIRUS 2 (HSV-2), PCR Not Detected     HUMAN PARECHOVIRUS, PCR Not Detected     VARICELLA ZOSTER VIRUS (VZV), PCR Not Detected     CRYPTOCOCCUS NEOFORMANS / GATTII, PCR Not Detected     HUMAN HERPES VIRUS 6 PCR Not Detected    Cryptococcal AG, CSF - Cerebrospinal Fluid, Lumbar Puncture [[id number]]  (Normal) Collected: 02/27/23 1246    Lab Status: Final result Specimen: Cerebrospinal Fluid from Lumbar Puncture Updated: 02/27/23 1435     Cryptococcal Antigen, CSF Negative          No radiology results from the last 24 hrs    Results for orders placed during the hospital encounter of 02/23/23    Adult Transthoracic Echo Complete W/ Cont if Necessary Per Protocol (With Agitated Saline)    Interpretation Summary  •  Left ventricular systolic function is normal. Calculated left ventricular EF = 67.2%  •  Left ventricular diastolic function was normal.  •  Saline test results are negative for right to left atrial level shunt.  •  Estimated right ventricular systolic pressure from tricuspid regurgitation is normal (<35 mmHg).      Current medications:  Scheduled Meds:amLODIPine, 5 mg, Oral, Q24H  aspirin, 81 mg, Oral, Daily   Or  aspirin, 300 mg, Rectal, Daily  atorvastatin, 80 mg, Oral, Nightly  levothyroxine, 100 mcg, Oral, Q AM  melatonin, 10 mg, Oral, Nightly  pantoprazole, 40 mg, Oral, Q AM  sertraline, 50 mg, Oral, Daily  sodium chloride, 10 mL, Intravenous, Q12H      Continuous Infusions:   PRN Meds:.•  hydrOXYzine  •  Magnesium  Standard Dose Replacement - Initiate Nurse / BPA Driven Protocol  •  oxyCODONE-acetaminophen  •  Phosphorus Replacement - Initiate Nurse / BPA Driven Protocol  •  Potassium Replacement - Initiate Nurse / BPA Driven Protocol  •  sodium chloride  •  sodium chloride    Assessment & Plan   Assessment & Plan     Active Hospital Problems    Diagnosis  POA   • **Encephalopathy acute [G93.40]  Yes   • Suspected acute ischemic stroke  [I63.9]  Yes   • Hypertension [I10]  Yes   • Anxiety and depression [F41.9, F32.A]  Yes   • Hypothyroidism [E03.9]  Yes   • Arthritis [M19.90]  Yes   • COPD [J44.9]  Yes   • B-cell lymphoma  [C85.90]  Yes   • ? Subdural hematoma [S06.5XAA]  Yes   • Frequent falls [R29.6]  Not Applicable   • Left wrist fracture [S62.102A]  Yes   • Illicit drug use (UDS + opiates, BZDs, and TCAs) [F19.90]  Yes      Resolved Hospital Problems   No resolved problems to display.        Brief Hospital Course to date:  Dorothy Zavala is a 77 y.o. 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 Dukes in [facility]), current falls with numerous fractures, who presents to [facility] on 2/23/2023 was transferred from [facility] for evaluation of stroke.Stroke imaging was negative. Per report, history from son (who is apparently estranged from his mother) suggests that she has had declining neurologic status. Patient started on antibiotics initially for empiric coverage of meningitis per neurology.  They no longer feels her symptoms are concerning for meningitis and their note states that it is okay to stop antibiotics, off antibiotics and observing. Encephalopathy is improved but neurology concerned for chronic meningitis. LP in IR completed which was negative.     This patient's problems and plans were partially entered by my partner and updated as  "appropriate by me 03/06/23.     Encephalopathy  - Improved. Possible alcohol/medication withdrawal.  - Neurology concerned for chronic meningitis. LP per IR on 2/27/23 stable- culture negative at 48 hrs and meningitis/encephalitis panel negative. Improving off abx.  - PT/OT/SLP, referrals pending.     Depression  - Patient complaining of depression but states she no longer wants to take her Trazodone. Long d/w her son who thinks depression is playing a large role in her decline particularly due to recent passing of her brother and recent loss of her house. He asks that we be \"tough on her\" otherwise she will not do much for herself.  - Has been started on Zoloft 50mg PO daily, increase as tolerated as outpatient.     HTN  - Better when she was taking amlodipine however she is currently refusing antihypertensives.      CVA/TIA ruled out.  - Continue asa/statin     Hypothyroidism  - Continue synthroid     Lymphoma  - Needs oncology follow up     Wrist fracture, L  - Seen by orthopedic surgery, Dr. Ramos-  Recommends patient return in 1 to 2 weeks to clinic for repeat radiographs.  Okay for weightbearing through elbow left upper extremity, otherwise nonweightbearing left upper extremity     Have spoken with her son Asa, all questions answered. Updated on POC, medical readiness for rehab.      Expected Discharge Location and Transportation:   Expected Discharge   Expected Discharge Date and Time     Expected Discharge Date Expected Discharge Time    Mar 7, 2023            DVT prophylaxis:  Mechanical DVT prophylaxis orders are present.     AM-PAC 6 Clicks Score (PT): 23 (03/06/23 0800)    CODE STATUS:   Code Status and Medical Interventions:   Ordered at: 02/23/23 8587     Code Status (Patient has no pulse and is not breathing):    CPR (Attempt to Resuscitate)     Medical Interventions (Patient has pulse or is breathing):    Full Support       Tegan Up II, DO  03/06/23    "

## 2023-03-06 NOTE — THERAPY TREATMENT NOTE
Acute Care - Speech Language Pathology Treatment Note  [facility]        Patient Name: Dorothy Zavala  : 1945  MRN: [id number]  Today's Date: 3/6/2023               Admit Date: 2023     Visit Dx:    ICD-10-CM ICD-9-CM   1. Cognitive communication deficit  R41.841 799.52   2. Dysphagia, unspecified type  R13.10 787.20     Patient Active Problem List   Diagnosis   • Suspected acute ischemic stroke    • Hypertension   • Anxiety and depression   • Hypothyroidism   • Arthritis   • COPD   • B-cell lymphoma    • ? Subdural hematoma   • Frequent falls   • Left wrist fracture   • Illicit drug use (UDS + opiates, BZDs, and TCAs)   • Encephalopathy acute     Past Medical History:   Diagnosis Date   • Anxiety and depression 2023   • Arthritis 2023   • B-cell lymphoma  2023   • COPD 2023   • Hypertension 2023   • Hypothyroidism 2023     History reviewed. No pertinent surgical history.    SLP Recommendation and Plan                 Anticipated Discharge Disposition (SLP): unknown, anticipate therapy at next level of care (23)        Predicted Duration Therapy Intervention (Days): until discharge (23)     Daily Summary of Progress (SLP): progress toward functional goals is good (23)           Treatment Assessment (SLP): continued, cognitive-linguistic disorder (23)  Treatment Assessment Comments (SLP): Improvement in cog-comm. Targeted multiple defintions, and proverbs. Pt continued w/ deficits of staying on topic during conversation. (23)  Plan for Continued Treatment (SLP): continue treatment per plan of care (23)  Plan of Care Reviewed With: (P) patient (23)  Progress: (P) improving (23)      SLP EVALUATION (last 72 hours)     SLP SLC Evaluation     Row Name 23                   Communication Assessment/Intervention    Document Type therapy note (daily note)  -MP (r) LL (t) MP (c)         Subjective Information no complaints  -MP (r) LL (t) MP (c)        Patient Observations alert;cooperative  -MP (r) LL (t) MP (c)        Patient/Family/Caregiver Comments/Observations No family present  -MP (r) LL (t) MP (c)        Patient Effort good  -MP (r) LL (t) MP (c)           Pain    Additional Documentation Pain Scale: FACES Pre/Post-Treatment (Group)  -MP (r) LL (t) MP (c)           Pain Scale: FACES Pre/Post-Treatment    Pain: FACES Scale, Pretreatment 0-->no hurt  -MP (r) LL (t) MP (c)        Posttreatment Pain Rating 0-->no hurt  -MP (r) LL (t) MP (c)           SLP Treatment Clinical Impressions    Treatment Assessment (SLP) continued;cognitive-linguistic disorder  -MP (r) LL (t) MP (c)        Treatment Assessment Comments (SLP) Improvement in cog-comm. Targeted multiple defintions, and proverbs. Pt continued w/ deficits of staying on topic during conversation.  -MP (r) LL (t) MP (c)        Daily Summary of Progress (SLP) progress toward functional goals is good  -MP (r) LL (t) MP (c)        Plan for Continued Treatment (SLP) continue treatment per plan of care  -MP (r) LL (t) MP (c)        Care Plan Review care plan/treatment goals reviewed  -MP (r) LL (t) MP (c)           Recommendations    Therapy Frequency (SLP SLC) 5 days per week  -MP (r) LL (t) MP (c)        Predicted Duration Therapy Intervention (Days) until discharge  -MP (r) LL (t) MP (c)        Anticipated Discharge Disposition (SLP) unknown;anticipate therapy at next level of care  -MP (r) LL (t) MP (c)              User Key  (r) = Recorded By, (t) = Taken By, (c) = Cosigned By    Initials Name Effective Dates    Alexander Herrera, MS CCC-SLP 12/28/21 -     Sonal Foreman, Speech Therapy Student 01/20/23 -                    EDUCATION  The patient has been educated in the following areas:     Cognitive Impairment Communication Impairment.           SLP GOALS     Row Name 03/06/23 1450             Patient will demonstrate  functional language skills for return to discharge environment     [place] with minimal cues  -MP (r) LL (t) MP (c)      Time frame by discharge  -MP (r) LL (t) MP (c)      Progress/Outcomes continuing progress toward goal  -MP (r) LL (t) MP (c)         Patient will demonstrate functional cognitive-linguistic skills for return to discharge environment    [place] with minimal cues  -MP (r) LL (t) MP (c)      Time frame by discharge  -MP (r) LL (t) MP (c)      Progress/Outcomes continuing progress toward goal  -MP (r) LL (t) MP (c)         Connected Speech to Express Thoughts Goal 1 (SLP)    Improve Narrative Discourse to Express Thoughts By Goal 1 (SLP) giving multiple definitions of a word;describing a picture;explaining a proverb or idiom;conversational task on a given topic;80%;with minimal cues (75-90%)  -MP (r) LL (t) MP (c)      Time Frame (Connected Speech Goal 1, SLP) short term goal (STG)  -MP (r) LL (t) MP (c)      Progress (Connected Speech Goal 1, SLP) 20%;40%;50%;with minimal cues (75-90%)  -MP (r) LL (t) MP (c)      Progress/Outcomes (Connected Speech Goal 1, SLP) continuing progress toward goal  -MP (r) LL (t) MP (c)      Comment (Connected Speech Goal 1, SLP) 2/5 multiple defintions, 1/5 proverbs, difficulty staying on topic during conversation half of time, needed cues to redirect to conversation topic.  -MP (r) LL (t) MP (c)         Awareness of Basic Personal Information Goal 1 (SLP)    Improve Awareness of Basic Personal Information Goal 1 (SLP) naming self, family members;answering open-ended questions regarding personal/biographical information;80%;with minimal cues (75-90%)  -MP (r) LL (t) MP (c)      Time Frame (Awareness of Basic Personal Information Goal 1, SLP) short term goal (STG)  -MP (r) LL (t) MP (c)      Progress/Outcomes (Awareness of Basic Personal Information Goal 1, SLP) goal ongoing  -MP (r) LL (t) MP (c)            User Key  (r) = Recorded By, (t) = Taken By, (c) =  Cosigned By    Initials Name Provider Type     Alexander Olmstead, MS CCC-SLP Speech and Language Pathologist     Sonal Franz, Speech Therapy Student SLP Student                        Time Calculation:      Time Calculation- SLP     Row Name 03/06/23 1521             Time Calculation- SLP    SLP Start Time 1450 (P)   -LL      SLP Received On 03/06/23 (P)   -LL         Untimed Charges    [id number] Treatment/ST Modification Prosth Aug Alter  25 (P)   -LL         Total Minutes    Untimed Charges Total Minutes 25 (P)   -LL       Total Minutes 25 (P)   -LL            User Key  (r) = Recorded By, (t) = Taken By, (c) = Cosigned By    Initials Name Provider Type     Sonal Franz, Speech Therapy Student SLP Student                Therapy Charges for Today     Code Description Service Date Service Provider Modifiers Qty    [id number]  ST TREATMENT SPEECH 2 3/6/2023 Sonal Franz, Speech Therapy Student GN 1                     Sonal Franz Speech Therapy Student  3/6/2023

## 2023-03-06 NOTE — DISCHARGE PLACEMENT REQUEST
"Dorothy Garcia (77 y.o. Female)       Clinical updates.  Jacqueline, please let me know where we are with this referral.  Thank you, Marta RN BSN, Case Management, ph [phone].      Date of Birth   1945    Social Security Number       Address   [address] [place], [place] [address]    Home Phone   [phone]    MRN   [id number]       Yarsanism   Unknown    Marital Status                               Admission Date   23    Admission Type   Urgent    Admitting Provider   Tegan Up II, DO    Attending Provider   Tegan Up II, DO    Department, Room/Bed   [facility] 3E, S331/1       Discharge Date       Discharge Disposition       Discharge Destination                               Attending Provider: Tegan Up II, DO    Allergies: Penicillins    Isolation: None   Infection: None   Code Status: CPR    Ht: 172.7 cm (67.99\")   Wt: 104 kg (230 lb)    Admission Cmt: None   Principal Problem: Encephalopathy acute [G93.40]                 Active Insurance as of 2023     Primary Coverage     Payor Plan Insurance Group Employer/Plan Group    ANTHEM MEDICARE REPLACEMENT ANTHEM MEDICARE ADVANTAGE [id number]     Payor Plan Address Payor Plan Phone Number Payor Plan Fax Number Effective Dates    [address] [phone]  2023 - None Entered    [facility] [id number]       Subscriber Name Subscriber Birth Date Member ID       DOROTHY GARCIA 1945 [id number]                 Emergency Contacts      (Rel.) Home Phone Work Phone Mobile Phone    Asa Garcia (Son) -- -- [phone]    AdelaYazan (Brother) -- -- [phone]               Physician Progress Notes (last 24 hours)      Tegan Up II, DO at 23 0918              [facility] Medicine Services  PROGRESS NOTE    Patient Name: Dorothy Garcia  : 1945  MRN: [id number]    Date of Admission: 2023  Primary Care Physician: Provider, No " Known    Subjective   Subjective     CC: f/u AMS    HPI: Up walking into bathroom. Slowly feeling better. Eating fairly well.    ROS:  Gen- No fevers, chills  CV- No chest pain, palpitations  Resp- No cough, dyspnea  GI- No N/V/D, abd pain     Objective   Objective     Vital Signs:   Temp:  [98 °F (36.7 °C)-99 °F (37.2 °C)] 98.3 °F (36.8 °C)  Heart Rate:  [] 94  Resp:  [16-18] 18  BP: (130-168)/(72-96) 158/75     Physical Exam:  Constitutional: No acute distress, awake, alert, looking better  HENT: NCAT, mucous membranes moist  Respiratory: Clear to auscultation bilaterally, respiratory effort normal   Cardiovascular: RRR, no murmurs, rubs, or gallops  Gastrointestinal: Positive bowel sounds, soft, nontender, nondistended  Musculoskeletal: No bilateral ankle edema  Psychiatric: Appropriate affect, cooperative  Neurologic: Oriented x 3, strength symmetric in all extremities, Cranial Nerves grossly intact to confrontation, speech clear  Skin: No rashes    Results Reviewed:  LAB RESULTS:      Lab 03/03/23  0724 03/02/23  0600 03/01/23  0529 02/28/23  0659 02/27/23  1429   WBC 11.16* 11.09* 10.79 10.70 10.38   HEMOGLOBIN 13.4 13.8 13.8 13.5 12.9   HEMATOCRIT 42.2 43.6 43.5 42.3 40.5   PLATELETS 298 273 264 245 235   NEUTROS ABS  --   --   --   --  7.50*   IMMATURE GRANS (ABS)  --   --   --   --  0.03   LYMPHS ABS  --   --   --   --  1.79   MONOS ABS  --   --   --   --  0.94*   EOS ABS  --   --   --   --  0.10   MCV 91.7 92.8 94.0 91.8 93.3         Lab 03/03/23  0724 03/02/23  0600 03/01/23  0529 02/28/23  0659 02/27/23  1429 02/26/23  1837   SODIUM 138 138 140 140 140  --    POTASSIUM 4.5 4.4 4.2 3.7 4.0  --    CHLORIDE 101 102 105 103 103  --    CO2 24.0 27.0 24.0 27.0 30.0*  --    ANION GAP 13.0 9.0 11.0 10.0 7.0  --    BUN 15 14 11 9 9  --    CREATININE 0.64 0.65 0.55* 0.52* 0.66  --    EGFR 92.3 91.9 95.7 97.0 91.6  --    GLUCOSE 98 95 97 96 97  --    CALCIUM 9.4 9.0 8.5* 8.7 8.4*  --    PHOSPHORUS  --   --    --   --   --  2.4*         Lab 03/03/23  0724 03/02/23  0600 03/01/23  0529 02/28/23  0659   TOTAL PROTEIN 6.6 6.1 6.1 5.9*   ALBUMIN 3.9 3.7 3.8 3.5   GLOBULIN 2.7 2.4 2.3 2.4   ALT (SGPT) 18 22 22 19   AST (SGOT) 20 21 31 25   BILIRUBIN 0.5 0.4 0.3 0.3   ALK PHOS 83 78 80 68                     Brief Urine Lab Results  (Last result in the past 365 days)      Color   Clarity   Blood   Leuk Est   Nitrite   Protein   CREAT   Urine HCG        02/24/23 1449 Yellow   Cloudy   Negative   Negative   Negative   Trace                 Microbiology Results Abnormal     Procedure Component Value - Date/Time    Fungus Culture - Cerebrospinal Fluid, Lumbar Puncture [[id number]]  (Normal) Collected: 02/27/23 1246    Lab Status: Preliminary result Specimen: Cerebrospinal Fluid from Lumbar Puncture Updated: 03/04/23 1345     Fungus Culture No fungus isolated at less than 1 week    Culture, CSF - Cerebrospinal Fluid, Lumbar Puncture [[id number]] Collected: 02/27/23 1246    Lab Status: Final result Specimen: Cerebrospinal Fluid from Lumbar Puncture Updated: 03/02/23 1008     CSF Culture No growth at 3 days     Gram Stain Rare (1+) WBCs seen      No organisms seen    Blood Culture - Blood, Hand, Right [[id number]]  (Normal) Collected: 02/23/23 2122    Lab Status: Final result Specimen: Blood from Hand, Right Updated: 02/28/23 2146     Blood Culture No growth at 5 days    Narrative:      Aerobic bottle only      Blood Culture - Blood, Hand, Left [[id number]]  (Normal) Collected: 02/23/23 2122    Lab Status: Final result Specimen: Blood from Hand, Left Updated: 02/28/23 2146     Blood Culture No growth at 5 days    Narrative:      Aerobic bottle only      Meningitis / Encephalitis Panel, PCR - Cerebrospinal Fluid, Lumbar Puncture [[id number]]  (Normal) Collected: 02/27/23 1246    Lab Status: Final result Specimen: Cerebrospinal Fluid from Lumbar Puncture Updated: 02/27/23 1458     ESCHERICHIA COLI K1, PCR Not Detected     HAEMOPHILUS  INFLUENZAE, PCR Not Detected     LISTERIA MONOCYTOGENES, PCR Not Detected     NEISSERIA MENINGITIDIS, PCR Not Detected     STREPTOCOCCUS AGALACTIAE, PCR Not Detected     STREPTOCOCCUS PNEUMONIAE, PCR Not Detected     CYTOMEGALOVIRUS (CMV), PCR Not Detected     ENTEROVIRUS, PCR Not Detected     HERPES SIMPLEX VIRUS 1 (HSV-1), PCR Not Detected     HERPES SIMPLEX VIRUS 2 (HSV-2), PCR Not Detected     HUMAN PARECHOVIRUS, PCR Not Detected     VARICELLA ZOSTER VIRUS (VZV), PCR Not Detected     CRYPTOCOCCUS NEOFORMANS / GATTII, PCR Not Detected     HUMAN HERPES VIRUS 6 PCR Not Detected    Cryptococcal AG, CSF - Cerebrospinal Fluid, Lumbar Puncture [[id number]]  (Normal) Collected: 02/27/23 1246    Lab Status: Final result Specimen: Cerebrospinal Fluid from Lumbar Puncture Updated: 02/27/23 1435     Cryptococcal Antigen, CSF Negative          No radiology results from the last 24 hrs    Results for orders placed during the hospital encounter of 02/23/23    Adult Transthoracic Echo Complete W/ Cont if Necessary Per Protocol (With Agitated Saline)    Interpretation Summary  •  Left ventricular systolic function is normal. Calculated left ventricular EF = 67.2%  •  Left ventricular diastolic function was normal.  •  Saline test results are negative for right to left atrial level shunt.  •  Estimated right ventricular systolic pressure from tricuspid regurgitation is normal (<35 mmHg).      Current medications:  Scheduled Meds:amLODIPine, 5 mg, Oral, Q24H  aspirin, 81 mg, Oral, Daily   Or  aspirin, 300 mg, Rectal, Daily  atorvastatin, 80 mg, Oral, Nightly  levothyroxine, 100 mcg, Oral, Q AM  melatonin, 10 mg, Oral, Nightly  pantoprazole, 40 mg, Oral, Q AM  sertraline, 50 mg, Oral, Daily  sodium chloride, 10 mL, Intravenous, Q12H      Continuous Infusions:   PRN Meds:.•  hydrOXYzine  •  Magnesium Standard Dose Replacement - Initiate Nurse / BPA Driven Protocol  •  oxyCODONE-acetaminophen  •  Phosphorus Replacement - Initiate  Nurse / BPA Driven Protocol  •  Potassium Replacement - Initiate Nurse / BPA Driven Protocol  •  sodium chloride  •  sodium chloride    Assessment & Plan   Assessment & Plan     Active Hospital Problems    Diagnosis  POA   • **Encephalopathy acute [G93.40]  Yes   • Suspected acute ischemic stroke  [I63.9]  Yes   • Hypertension [I10]  Yes   • Anxiety and depression [F41.9, F32.A]  Yes   • Hypothyroidism [E03.9]  Yes   • Arthritis [M19.90]  Yes   • COPD [J44.9]  Yes   • B-cell lymphoma  [C85.90]  Yes   • ? Subdural hematoma [S06.5XAA]  Yes   • Frequent falls [R29.6]  Not Applicable   • Left wrist fracture [S62.102A]  Yes   • Illicit drug use (UDS + opiates, BZDs, and TCAs) [F19.90]  Yes      Resolved Hospital Problems   No resolved problems to display.        Brief Hospital Course to date:  Dorothy Zavala is a 77 y.o. 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 Dukes in [facility]), current falls with numerous fractures, who presents to [facility] on 2/23/2023 was transferred from [facility] for evaluation of stroke.Stroke imaging was negative. Per report, history from son (who is apparently estranged from his mother) suggests that she has had declining neurologic status. Patient started on antibiotics initially for empiric coverage of meningitis per neurology.  They no longer feels her symptoms are concerning for meningitis and their note states that it is okay to stop antibiotics, off antibiotics and observing. Encephalopathy is improved but neurology concerned for chronic meningitis. LP in IR completed which was negative.     This patient's problems and plans were partially entered by my partner and updated as appropriate by me 03/05/23.    Encephalopathy  - Improved. Possible alcohol/medication withdrawal.  - Neurology concerned for chronic  meningitis. LP per IR on 2/27/23 stable- culture negative at 48 hrs and meningitis/encephalitis panel negative. Improving off abx.  - PT/OT/SLP, referrals pending.     Depression  - Patient complaining of depression but states she no longer wants to take her Trazodone. Long d/w her son who thinks depression is playing a large role in her decline particularly due to recent passing of her brother and recent loss of her house.   - Has been started on Zoloft 50mg PO daily, increase as tolerated as outpatient.     HTN  - Better, started amlodipine.      CVA/TIA ruled out.  - Continue asa/statin     Hypothyroidism  - Continue synthroid     Lymphoma  - Needs oncology follow up     Wrist fracture, L  - Seen by orthopedic surgery, Dr. Ramos-  Recommends patient return in 1 to 2 weeks to clinic for repeat radiographs.  Okay for weightbearing through elbow left upper extremity, otherwise nonweightbearing left upper extremity     Have spoken with her son Asa, all questions answered. Updated on POC, medical readiness for rehab.      Expected Discharge Location and Transportation:   Expected Discharge   Expected Discharge Date and Time     Expected Discharge Date Expected Discharge Time    Mar 6, 2023            DVT prophylaxis:  Mechanical DVT prophylaxis orders are present.     AM-PAC 6 Clicks Score (PT): 23 (03/04/23 0833)    CODE STATUS:   Code Status and Medical Interventions:   Ordered at: 02/23/23 1517     Code Status (Patient has no pulse and is not breathing):    CPR (Attempt to Resuscitate)     Medical Interventions (Patient has pulse or is breathing):    Full Support       Tegan Up II, DO  03/05/23      Electronically signed by Tegan Up II, DO at 03/05/23 0921          Consult Notes (most recent note)      Nathaniel Ramos Jr., MD at 02/26/23 2004      Consult Orders    1. Inpatient Orthopedic Surgery Consult [[id number]] ordered by Amilcar Anthony MD at 02/26/23 1055               [facility], [facility]  [address]  [place]  [phone]       Orthopedic Consult    Patient: Dorothy Zavala    Date of Admission: 2/23/2023  2:48 PM    YOB: 1945    Medical Record Number: [id number]    Attending Physician: Amilcar Anthony MD    Consulting Physician: Nathaniel Ramos Jr, MD    Chief Complaint: CVA (cerebral vascular accident) (HCC) [I63.9]  Encephalopathy acute [G93.40]    History of Present Illness: 77 y.o. female admitted to [facility] with CVA (cerebral vascular accident) (HCC) [I63.9]  Encephalopathy acute [G93.40].      She sustained left distal radius fracture February 9.  She was placed in a short arm cast which she removed recently.  She is admitted with strokelike symptoms.     Allergies   Allergen Reactions   • Penicillins Hives        Home Medications:  No medications prior to admission.       Current Medications:  Scheduled Meds:aspirin, 81 mg, Oral, Daily   Or  aspirin, 300 mg, Rectal, Daily  atorvastatin, 80 mg, Oral, Nightly  levothyroxine, 100 mcg, Oral, Q AM  pantoprazole, 40 mg, Intravenous, Q AM  sodium chloride, 10 mL, Intravenous, Q12H      Continuous Infusions:hold, 1 each      PRN Meds:.•  hold  •  hydrOXYzine pamoate  •  LORazepam **OR** LORazepam **OR** LORazepam **OR** LORazepam **OR** LORazepam **OR** LORazepam **OR** LORazepam **OR** LORazepam  •  Magnesium Standard Dose Replacement - Initiate Nurse / BPA Driven Protocol  •  oxyCODONE-acetaminophen  •  Phosphorus Replacement - Initiate Nurse / BPA Driven Protocol  •  Potassium Replacement - Initiate Nurse / BPA Driven Protocol  •  sodium chloride  •  sodium chloride    Past Medical History:   Diagnosis Date   • Anxiety and depression 2/23/2023   • Arthritis 2/23/2023   • B-cell lymphoma  2/23/2023   • COPD 2/23/2023   • Hypertension 2/23/2023   • Hypothyroidism 2/23/2023      History reviewed. No pertinent surgical history.     Social History     Occupational History   • Not on file   Tobacco Use    • Smoking status: Not on file   • Smokeless tobacco: Not on file   Substance and Sexual Activity   • Alcohol use: Not on file   • Drug use: Not on file   • Sexual activity: Not on file      Social History     Social History Narrative   • Not on file      History reviewed. No pertinent family history.      Review of Systems:   HEENT: Patient denies any headaches, vision changes, change in hearing, or tinnitus, Patient denies any rhinorrhea,epistaxis, sinus pain, mouth or dental problems, sore throat or hoarseness, or dysphagia  Pulmonary: Patient denies any cough, congestion, SOA, or wheezing  Cardiovascular: Patient denies any chest pain, dyspnea, palpitations, weakness, intolerance of exercise, varicosities, swelling of extremities, known murmur  Gastrointestinal:  Patient denies nausea, vomiting, diarrhea, constipation, loss  of appetite, change in appetite, dysphagia, gas, heartburn, melena, change in bowel habits, use of laxatives or other drugs to alter the function of the gastrointestinal tract.  Genital/Urinary: Patient denies dysuria, change in color of urine, change in frequency of urination, pain with urgency, incontinence, retention, or nocturia.  Musculoskeletal: Patient denies increased warmth; redness; or swelling of joints; limitation of function; deformity; crepitation: pain in a joint or an extremity, the neck, or the back, especially with movement.  Neurological: Patient denies dizziness, tremor, ataxia, difficulty in speaking, change in speech, paresthesia, loss of sensation, seizures, syncope, changes in memory.  Endocrine system: Patient denies tremors, palpitations, intolerance of heat or cold, polyuria, polydipsia, polyphagia, diaphoresis, exophthalmos, or goiter.  Psychological: Patient denies thoughts/plans or harming self or other; depression,  insomnia, night terrors, yousif, memory loss, disorientation.  Skin: Patient denies any bruising, rashes, discoloration, pruritus, wounds, ulcers,  decubiti, changes in the hair or nails  Hematopoietic: Patient denies history of spontaneous or excessive bleeding, epistaxis, hematuria, melena, fatigue, enlarged or tender lymph nodes, pallor, history of anemia.    Physical Exam: 77 y.o. female  General Appearance:    Alert, cooperative, in no acute distress                   Vitals:    02/26/23 0749 02/26/23 1151 02/26/23 1553 02/26/23 1915   BP: 152/87 160/83  159/77   BP Location: Left arm Left arm  Left arm   Patient Position: Lying Lying  Lying   Pulse: 72 80  85   Resp: 16 16 16 18   Temp: 98.5 °F (36.9 °C) 98.2 °F (36.8 °C) 98 °F (36.7 °C) 97.6 °F (36.4 °C)   TempSrc: Oral Oral Oral Oral   SpO2: 93% 98%  98%   Weight:       Height:            Head:    Normocephalic, without obvious abnormality, atraumatic   Eyes:            Lids and lashes normal, conjunctivae and sclerae normal, no   icterus, no pallor, corneas clear, PERRLA   Ears:    Ears appear intact with no abnormalities noted   Throat:   No oral lesions, no thrush, oral mucosa moist   Back:     No C-T-L spine tenderness   Lungs:     Clear to auscultation,respirations regular, even and                  unlabored   Chest Wall:    No abnormalities observed   Abdomen:     Normal bowel sounds, no masses, no organomegaly, soft        non-tender, non-distended, no guarding, no rebound                tenderness   Extremities:  Moderate tenderness left wrist, grossly distally neurovascular intact.   Pulses:   Pulses palpable and equal bilaterally   Skin:   No bleeding, bruising or rash   Lymph nodes:   No palpable adenopathy   Neurologic:   Cranial nerves 2 - 12 grossly intact, sensation intact, DTR       present and equal bilaterally           Diagnostic Tests:    No results displayed because visit has over 200 results.          Adult Transthoracic Echo Complete W/ Cont if Necessary Per Protocol (With Agitated Saline)    Result Date: 2/24/2023  Narrative: •  Left ventricular systolic function is normal.  Calculated left ventricular EF = 67.2% •  Left ventricular diastolic function was normal. •  Saline test results are negative for right to left atrial level shunt. •  Estimated right ventricular systolic pressure from tricuspid regurgitation is normal (<35 mmHg).     EEG    Result Date: 2/24/2023  Narrative: Reason for referral: 77 y.o.female with altered mental status Technical Summary:  A 19 channel digital EEG was performed using the international 10-20 placement system, including eye leads and EKG leads. Duration: 20 minutes Findings: The patient is sleeping through much of the study.  The background shows diffuse low to medium amplitude 2-5 Hz intermixed delta and theta activity which is present symmetrically over both hemispheres.  Intermixed sleep spindles are variably seen.  Photic stimulation does not change the background.  Hyperventilation is not performed.  No focal features are seen.  No epileptiform activity is seen.  There is little change in the background to mechanical stimulation of the patient. Video: On Technical quality: Superior EKG: Regular, 50 bpm SUMMARY: Moderate generalized slow No epileptiform activity or focal features are seen     Impression: Diffuse cerebral dysfunction of moderate degree, nonspecific This report is transcribed using the Dragon dictation system.      XR Wrist 2 View Left    Result Date: 2/25/2023  Narrative: XR WRIST 2 VW LEFT Date of Exam: 2/25/2023 1:59 PM EST Indication: recent fracture, patient pulled cast off.. Comparison: 2/23/2023 Findings: A subacute oblique fracture is again noted involving the distal radial metaphysis. There is partial sclerosis and bridging callus. There remains mild ventral displacement of the distal fracture fragment. The fracture line remains visible. The articulations of the wrist remain intact without dislocation. Surrounding soft tissue swelling is noted.     Impression: Impression: 1.Subacute oblique fracture involving the distal left  radius. Partial bridging sclerosis and callus formation are noted. The alignment is stable. The fracture line remains visible. Electronically Signed: Trell Somers  2/25/2023 2:30 PM EST  Workstation ID: [id number]    XR Wrist 3+ View Left    Result Date: 2/23/2023  Narrative: XR WRIST 3+ VW LEFT Date of Exam: 2/23/2023 3:30 PM EST Indication: Left wrist fracture. Comparison: None available. Findings: The patient has a cast in place which obscures fine osseous detail. There is a healing non-displaced and non-angulated transverse fracture through the distal left radius. There is healing callus present with the fracture line still visualized. The bony structures are demineralized. There are mild degenerative changes involving the first metacarpal/carpal joint and the scaphoid/multangular joint.     Impression: Impression: Healing transverse fracture of the distal left radius as described. Electronically Signed: Moosefiliberto Ferrer  2/23/2023 3:55 PM EST  Workstation ID: [id number]    XR Wrist 3+ View Left    Result Date: 2/9/2023  Narrative: PROCEDURE INFORMATION: Exam: XR Left Wrist Exam date and time: 2/9/2023 7:59 PM Age: 77 years old Clinical indication: Injury or trauma; Fall; Other: Wrist injury TECHNIQUE: Imaging protocol: Radiologic exam of the Left wrist. Views: 3 or more views. COMPARISON: No relevant prior studies available. FINDINGS: Bones/joints: There is a nondisplaced impacted distal left radius fracture in near anatomic alignment. Soft tissues: Soft tissue swelling is seen about the wrist.     Impression: IMPRESSION: There is a nondisplaced impacted distal left radius fracture in near anatomic alignment. THIS DOCUMENT HAS BEEN ELECTRONICALLY SIGNED BY CRISTY WITT MD on 02/09/2023 08:06 PM    CT Angiogram Neck    Result Date: 2/23/2023  Narrative: CT CEREBRAL PERFUSION W WO CONTRAST, CT ANGIOGRAM NECK, CT ANGIOGRAM HEAD W AI ANALYSIS OF LVO Date of Exam: 2/23/2023 2:53 PM EST Indication: Neuro deficit, acute,  stroke suspected.  Comparison: None available. Technique: Axial CT images of the brain were obtained prior to and after the administration of 200 mL Isovue-370. Core blood volume, core blood flow, mean transit time, and Tmax images were obtained utilizing the Rapid software protocol. A limited CT angiogram of the head was also performed to measure the blood vessel density. Axial IV arterial phase contrast-enhanced CT angiogram of the head and neck. Three-dimensional reconstructions were postprocessed. The radiation dose reduction device was turned on for each scan per the ALARA (As Low as Reasonably Achievable) protocol. Findings: CT perfusion: Color maps demonstrate no evidence of core infarct or significant territorial ischemic tissue at risk. CT ANGIOGRAM: The lung apices are grossly clear. Evaluation of the neck soft tissues demonstrates no pathologic cervical adenopathy or unexpected aerodigestive tract mass. Evaluation of the osseous structures demonstrates no evidence of acute fracture or  aggressive osseous lesion. Three-vessel aortic arch with patent origins. The visualized subclavian arteries are both patent. On the right, there is no significant atherosclerotic narrowing of the right ICA origin, 0% by NASCET criteria. Similar 0% narrowing is present on the left. The vertebral arteries are normal in course and caliber bilaterally. Intracranially, the carotid siphons demonstrate no significant atherosclerotic narrowing. The anterior cerebral arteries are normal in course and caliber bilaterally. The right middle cerebral artery demonstrates no evidence of flow-limiting stenosis, large vessel occlusion or aneurysmal dilatation. The left middle cerebral artery is similarly normal in course and caliber. The vertebrobasilar system is patent. The posterior fissural arteries are normal in course and caliber bilaterally.     Impression: Impression: CT perfusion demonstrates no evidence of core infarct or  significant territorial ischemic tissue at risk. Essentially normal CT angiogram of the head and neck, without evidence of flow-limiting stenosis, large vessel occlusion or aneurysmal dilatation. Electronically Signed: James Lopes  2/23/2023 3:46 PM EST  Workstation ID: [id number]    MRI Brain With & Without Contrast    Result Date: 2/25/2023  Narrative: MRI BRAIN W WO CONTRAST Date of Exam: 2/25/2023 12:25 PM EST Indication: encephalopathy.  Comparison: None available. Technique:  Routine multiplanar/multisequence sequence images of the brain were obtained before and after the uneventful administration of  20 mL Multihance. FINDINGS: No abnormal diffusion restriction is observed to indicate acute or subacute focal ischemia. Nonspecific diffuse white matter signal changes are noted indicating chronic small vessel ischemic changes or age-related changes. Associated diffuse volume loss is noted. There is no evidence for abnormal cerebral edema. There is no evidence for abnormal focal enhancement or abnormal enhancing mass. There is no mass effect or midline shift. The ventricular system is nondilated. The basilar cisterns are patent. The midline structures are unremarkable. No significant extra-axial fluid collections are identified. No significant abnormal signal is observed involving the paranasal sinuses or mastoid air cells.     Impression: 1.No evidence for acute or subacute focal ischemia. 2.No evidence for abnormal focal enhancement or abnormal enhancing mass. 3.Diffuse nonspecific white matter signal changes are noted with associated diffuse volume loss. These findings are most consistent with chronic small vessel ischemic changes or age-related changes. Electronically Signed: Trell Somers  2/25/2023 1:08 PM EST  Workstation ID: [id number]    XR Chest 1 View    Result Date: 2/24/2023  Narrative: XR CHEST 1 VW Date of Exam: 2/24/2023 12:45 PM EST Indication: Verifying PICC placement. Comparison: February 23,  2023 Findings: There is a right arm catheter with tip projecting in the mid SVC. No pneumothorax is seen. There is mild elevation of the right diaphragm, as before. No pleural effusion or significant new infiltrate. Heart size and mediastinal contour appear unchanged.     Impression: Impression: 1.Right arm catheter with tip projecting in the mid SVC. 2.No pneumothorax or other new cardiopulmonary abnormality. Electronically Signed: Renard Cordobaoswald  2/24/2023 1:07 PM EST  Workstation ID: [id number]    XR Chest 1 View    Result Date: 2/23/2023  Narrative: XR CHEST 1 VW Date of Exam: 2/23/2023 3:29 PM EST Indication: Acute cerebrovascular accident. Comparison: None available. Findings: The heart size is normal. The pulmonary vascular markings are normal. The lungs and pleural spaces are clear of active disease. The bony thorax is normal for age.     Impression: Impression: No active disease. Electronically Signed: Moose Ferrer  2/23/2023 3:49 PM EST  Workstation ID: [id number]    CT Head Without Contrast Stroke Protocol    Result Date: 2/23/2023  Narrative: CT HEAD WO CONTRAST STROKE PROTOCOL Date of Exam: 2/23/2023 2:48 PM EST Indication: Neuro deficit, acute, stroke suspected. Comparison: 11/10/2009 Technique: Axial CT images were obtained of the head without contrast administration.  Reconstructed coronal and sagittal images were also obtained. Automated exposure control and iterative construction methods were used. Scan Time:  2:52 PM Results discussed with stroke team at 2:55 PM. Findings: Parenchyma:No acute intraparenchymal hemorrhage. Possible loss of gray-white differentiation along the right anterior frontal lobe. Normal parenchymal volume. Scattered periventricular and subcortical white matter hypodensities, nonspecific, but most often consistent with small vessel ischemic changes. No midline shift or herniation. Ventricles and extra axial spaces:Normal caliber of ventricles and sulci. There is some mild hyperdense  thickening of the apex of the tentorium best seen on coronal image 40. Other:Lens replacements. Paranasal sinuses are clear. Mastoid air cells are clear. Calvarium is intact. Intracranial atherosclerotic calcification is present.     Impression: Impression: Possible loss of gray-white differentiation along the right anterior frontal lobe. Recommend correlation with MRI. Mild hyperdense thickening of the apex of the tentorium may reflect a small subdural hemorrhage. Recommendation correlation with any history of trauma and continued attention on follow-up. Chronic changes as above. Electronically Signed: Ang MonLakia  2/23/2023 3:07 PM EST  Workstation ID: [id number]    CT Angiogram Head w AI Analysis of LVO    Result Date: 2/23/2023  Narrative: CT CEREBRAL PERFUSION W WO CONTRAST, CT ANGIOGRAM NECK, CT ANGIOGRAM HEAD W AI ANALYSIS OF LVO Date of Exam: 2/23/2023 2:53 PM EST Indication: Neuro deficit, acute, stroke suspected.  Comparison: None available. Technique: Axial CT images of the brain were obtained prior to and after the administration of 200 mL Isovue-370. Core blood volume, core blood flow, mean transit time, and Tmax images were obtained utilizing the Rapid software protocol. A limited CT angiogram of the head was also performed to measure the blood vessel density. Axial IV arterial phase contrast-enhanced CT angiogram of the head and neck. Three-dimensional reconstructions were postprocessed. The radiation dose reduction device was turned on for each scan per the ALARA (As Low as Reasonably Achievable) protocol. Findings: CT perfusion: Color maps demonstrate no evidence of core infarct or significant territorial ischemic tissue at risk. CT ANGIOGRAM: The lung apices are grossly clear. Evaluation of the neck soft tissues demonstrates no pathologic cervical adenopathy or unexpected aerodigestive tract mass. Evaluation of the osseous structures demonstrates no evidence of acute fracture or  aggressive  osseous lesion. Three-vessel aortic arch with patent origins. The visualized subclavian arteries are both patent. On the right, there is no significant atherosclerotic narrowing of the right ICA origin, 0% by NASCET criteria. Similar 0% narrowing is present on the left. The vertebral arteries are normal in course and caliber bilaterally. Intracranially, the carotid siphons demonstrate no significant atherosclerotic narrowing. The anterior cerebral arteries are normal in course and caliber bilaterally. The right middle cerebral artery demonstrates no evidence of flow-limiting stenosis, large vessel occlusion or aneurysmal dilatation. The left middle cerebral artery is similarly normal in course and caliber. The vertebrobasilar system is patent. The posterior fissural arteries are normal in course and caliber bilaterally.     Impression: Impression: CT perfusion demonstrates no evidence of core infarct or significant territorial ischemic tissue at risk. Essentially normal CT angiogram of the head and neck, without evidence of flow-limiting stenosis, large vessel occlusion or aneurysmal dilatation. Electronically Signed: James Lopes  2/23/2023 3:46 PM EST  Workstation ID: [id number]    CT CEREBRAL PERFUSION WITH & WITHOUT CONTRAST    Result Date: 2/23/2023  Narrative: CT CEREBRAL PERFUSION W WO CONTRAST, CT ANGIOGRAM NECK, CT ANGIOGRAM HEAD W AI ANALYSIS OF LVO Date of Exam: 2/23/2023 2:53 PM EST Indication: Neuro deficit, acute, stroke suspected.  Comparison: None available. Technique: Axial CT images of the brain were obtained prior to and after the administration of 200 mL Isovue-370. Core blood volume, core blood flow, mean transit time, and Tmax images were obtained utilizing the Rapid software protocol. A limited CT angiogram of the head was also performed to measure the blood vessel density. Axial IV arterial phase contrast-enhanced CT angiogram of the head and neck. Three-dimensional reconstructions were  postprocessed. The radiation dose reduction device was turned on for each scan per the ALARA (As Low as Reasonably Achievable) protocol. Findings: CT perfusion: Color maps demonstrate no evidence of core infarct or significant territorial ischemic tissue at risk. CT ANGIOGRAM: The lung apices are grossly clear. Evaluation of the neck soft tissues demonstrates no pathologic cervical adenopathy or unexpected aerodigestive tract mass. Evaluation of the osseous structures demonstrates no evidence of acute fracture or  aggressive osseous lesion. Three-vessel aortic arch with patent origins. The visualized subclavian arteries are both patent. On the right, there is no significant atherosclerotic narrowing of the right ICA origin, 0% by NASCET criteria. Similar 0% narrowing is present on the left. The vertebral arteries are normal in course and caliber bilaterally. Intracranially, the carotid siphons demonstrate no significant atherosclerotic narrowing. The anterior cerebral arteries are normal in course and caliber bilaterally. The right middle cerebral artery demonstrates no evidence of flow-limiting stenosis, large vessel occlusion or aneurysmal dilatation. The left middle cerebral artery is similarly normal in course and caliber. The vertebrobasilar system is patent. The posterior fissural arteries are normal in course and caliber bilaterally.     Impression: Impression: CT perfusion demonstrates no evidence of core infarct or significant territorial ischemic tissue at risk. Essentially normal CT angiogram of the head and neck, without evidence of flow-limiting stenosis, large vessel occlusion or aneurysmal dilatation. Electronically Signed: James Lopes  2/23/2023 3:46 PM EST  Workstation ID: [id number]        Assessment:  Patient Active Problem List   Diagnosis   • Suspected acute ischemic stroke    • Hypertension   • Anxiety and depression   • Hypothyroidism   • Arthritis   • COPD   • B-cell lymphoma    • ? Subdural  hematoma   • Frequent falls   • Left wrist fracture   • Illicit drug use (UDS + opiates, BZDs, and TCAs)   • Encephalopathy acute     77-year-old female with left distal radius fracture.      Plan: She has some shortening, but overall appropriate alignment particularly on the lateral view.  Plan for continued nonoperative management, continue brace which was placed in the interim.  Return in 1 to 2 weeks to clinic for repeat radiographs.  Okay for weightbearing through elbow left upper extremity, otherwise nonweightbearing left upper extremity            Date: 2023    Nathaniel Ramos Jr, MD      Electronically signed by Nathaniel Ramos Jr., MD at 23          Physical Therapy Notes (most recent note)      Shanelle Roach, PT at 23 0846  Version 1 of 1         Patient Name: Dorothy Zavala  : 1945    MRN: [id number]                              Today's Date: 3/3/2023       Admit Date: 2023    Visit Dx:     ICD-10-CM ICD-9-CM   1. Cognitive communication deficit  R41.841 799.52   2. Dysphagia, unspecified type  R13.10 787.20     Patient Active Problem List   Diagnosis   • Suspected acute ischemic stroke    • Hypertension   • Anxiety and depression   • Hypothyroidism   • Arthritis   • COPD   • B-cell lymphoma    • ? Subdural hematoma   • Frequent falls   • Left wrist fracture   • Illicit drug use (UDS + opiates, BZDs, and TCAs)   • Encephalopathy acute     Past Medical History:   Diagnosis Date   • Anxiety and depression 2023   • Arthritis 2023   • B-cell lymphoma  2023   • COPD 2023   • Hypertension 2023   • Hypothyroidism 2023     History reviewed. No pertinent surgical history.   General Information     Row Name 23 1051          Physical Therapy Time and Intention    Document Type therapy note (daily note)  -KG     Mode of Treatment physical therapy  -KG     Row Name 23 1051          General Information    Existing  Precautions/Restrictions fall;left;non-weight bearing;other (see comments)  NWB LUE  -KG     Row Name 03/03/23 1051          Cognition    Orientation Status (Cognition) oriented x 4  -KG     Row Name 03/03/23 1051          Safety Issues, Functional Mobility    Safety Issues Affecting Function (Mobility) safety precaution awareness;safety precautions follow-through/compliance  -KG     Impairments Affecting Function (Mobility) balance;endurance/activity tolerance;strength  -KG           User Key  (r) = Recorded By, (t) = Taken By, (c) = Cosigned By    Initials Name Provider Type    KG Shanelle Roach, PT Physical Therapist               Mobility     Row Name 03/03/23 1052          Bed Mobility    Bed Mobility supine-sit  -KG     Supine-Sit [place] (Bed Mobility) supervision  -KG     Assistive Device (Bed Mobility) head of bed elevated  -KG     Comment, (Bed Mobility) Pt demonstrated appropriate sequencing.  -KG     Row Name 03/03/23 1052          Transfers    Comment, (Transfers) VC's for sequencing.  -KG     Row Name 03/03/23 1052          Sit-Stand Transfer    Sit-Stand [place] (Transfers) standby assist;verbal cues  -KG     Row Name 03/03/23 1052          Gait/Stairs (Locomotion)    [place] Level (Gait) contact guard;verbal cues  -KG     Assistive Device (Gait) cane, straight  -KG     Distance in Feet (Gait) 100  -KG     Deviations/Abnormal Patterns (Gait) [place] decreased;stride length decreased  -KG     Bilateral Gait Deviations forward flexed posture  -KG     Comment, (Gait/Stairs) Pt demonstrated step through gait pattern with slow [place] and decreased step length. Improved balance and stability this session. Pt's ambulation distance limited by c/o mild dizziness.  -KG     Row Name 03/03/23 1052          Mobility    Extremity Weight-bearing Status left upper extremity  -KG     Left Upper Extremity (Weight-bearing Status) non weight-bearing (NWB)  -KG           User Key  (r) = Recorded By,  (t) = Taken By, (c) = Cosigned By    Initials Name Provider Type    KG Shanelle Roach, PT Physical Therapist               Obj/Interventions     Row Name 03/03/23 1053          Balance    Balance Assessment sitting static balance;standing static balance;standing dynamic balance  -KG     Static Sitting Balance independent  -KG     Position, Sitting Balance unsupported;sitting edge of bed  -KG     Static Standing Balance standby assist  -KG     Dynamic Standing Balance contact guard  -KG     Position/Device Used, Standing Balance supported;cane, straight  -KG           User Key  (r) = Recorded By, (t) = Taken By, (c) = Cosigned By    Initials Name Provider Type    KG Shanelle Roach, PT Physical Therapist               Goals/Plan    No documentation.                Clinical Impression     Row Name 03/03/23 1054          Pain    Pretreatment Pain Rating 0/10 - no pain  -KG     Posttreatment Pain Rating 0/10 - no pain  -KG     Row Name 03/03/23 1054          Plan of Care Review    Plan of Care Reviewed With patient  -KG     Progress improving  -KG     Outcome Evaluation Pt increased ambulation distance to 100ft with CGA and straight cane. Pt demonstrated improved balance and coordination this session. Limited by c/o mild dizziness. Pt able to maintain NWB status of L wrist. Denied any c/o pain. Continue to recommend PT skilled care.  -KG     Row Name 03/03/23 1054          Vital Signs    Pre Systolic BP Rehab 157  -KG     Pre Treatment Diastolic BP 89  -KG     Post Systolic BP Rehab 166  -KG     Post Treatment Diastolic   -KG     Pretreatment Heart Rate (beats/min) 86  -KG     Posttreatment Heart Rate (beats/min) 98  -KG     Pre SpO2 (%) 98  -KG     O2 Delivery Pre Treatment room air  -KG     Post SpO2 (%) 98  -KG     O2 Delivery Post Treatment room air  -KG     Pre Patient Position Supine  -KG     Intra Patient Position Standing  -KG     Post Patient Position Sitting  -KG     Row Name 03/03/23 1057           Positioning and Restraints    Pre-Treatment Position in bed  -KG     Post Treatment Position chair  -KG     In Chair notified nsg;reclined;call light within reach;encouraged to call for assist;exit alarm on;LUE elevated;legs elevated  -KG           User Key  (r) = Recorded By, (t) = Taken By, (c) = Cosigned By    Initials Name Provider Type    Shanelle Morelos PT Physical Therapist               Outcome Measures     Row Name 03/03/23 1055          How much help from another person do you currently need...    Turning from your back to your side while in flat bed without using bedrails? 4  -KG     Moving from lying on back to sitting on the side of a flat bed without bedrails? 3  -KG     Moving to and from a bed to a chair (including a wheelchair)? 3  -KG     Standing up from a chair using your arms (e.g., wheelchair, bedside chair)? 3  -KG     Climbing 3-5 steps with a railing? 2  -KG     To walk in hospital room? 3  -KG     AM-PAC 6 Clicks Score (PT) 18  -KG     Highest level of mobility 6 --> Walked 10 steps or more  -KG     Row Name 03/03/23 1055          Functional Assessment    Outcome Measure Options AM-PAC 6 Clicks Basic Mobility (PT)  -KG           User Key  (r) = Recorded By, (t) = Taken By, (c) = Cosigned By    Initials Name Provider Type    Shanelle Morelos PT Physical Therapist                             Physical Therapy Education     Title: PT OT SLP Therapies (In Progress)     Topic: Physical Therapy (Done)     Point: Mobility training (Done)     Learning Progress Summary           Patient Acceptance, E, VU,NR by KG at 3/3/2023 0846    Acceptance, E, VU by SJ at 3/1/2023 1623    Acceptance, E, NR by AS at 2/27/2023 0958    Acceptance, E, VU by CM at 2/24/2023 1449   Family Acceptance, E, VU by CM at 2/24/2023 1449                   Point: Home exercise program (Done)     Learning Progress Summary           Patient Acceptance, E, VU,NR by KG at 3/3/2023 0846    Acceptance, E,  VU by SJ at 3/1/2023 1623    Acceptance, E, NR by AS at 2/27/2023 0958                   Point: Body mechanics (Done)     Learning Progress Summary           Patient Acceptance, E, VU,NR by KG at 3/3/2023 0846    Acceptance, E, VU by SJ at 3/1/2023 1623    Acceptance, E, NR by AS at 2/27/2023 0958                   Point: Precautions (Done)     Learning Progress Summary           Patient Acceptance, E, VU,NR by KG at 3/3/2023 0846    Acceptance, E, VU by SJ at 3/1/2023 1623    Acceptance, E, NR by AS at 2/27/2023 0958    Acceptance, E, VU by CM at 2/24/2023 1449   Family Acceptance, E, VU by CM at 2/24/2023 1449                               User Key     Initials Effective Dates Name Provider Type Discipline     02/03/23 -  Alyssia Saini, PT Physical Therapist PT    AS 02/03/23 -  Danisha Rai, PTA Physical Therapist Assistant PT     05/22/20 -  Shanelle Roach, PT Physical Therapist PT    CM 09/22/22 -  Kiana Davis, PT Physical Therapist PT              PT Recommendation and Plan     Plan of Care Reviewed With: patient  Progress: improving  Outcome Evaluation: Pt increased ambulation distance to 100ft with CGA and straight cane. Pt demonstrated improved balance and coordination this session. Limited by c/o mild dizziness. Pt able to maintain NWB status of L wrist. Denied any c/o pain. Continue to recommend PT skilled care.     Time Calculation:    PT Charges     Row Name 03/03/23 0846             Time Calculation    Start Time 0846  -KG      PT Received On 03/03/23  -KG      PT Goal Re-Cert Due Date 03/06/23  -KG         Time Calculation- PT    Total Timed Code Minutes- PT 24 minute(s)  -KG         Timed Charges    52597 - PT Therapeutic Activity Minutes 24  -KG         Total Minutes    Timed Charges Total Minutes 24  -KG       Total Minutes 24  -KG            User Key  (r) = Recorded By, (t) = Taken By, (c) = Cosigned By    Initials Name Provider Type    KG Shanelle Roach, PT  Physical Therapist              Therapy Charges for Today     Code Description Service Date Service Provider Modifiers Qty    [id number]  PT THERAPEUTIC ACT EA 15 MIN 3/3/2023 Shanelle Roach, PT GP 2          PT G-Codes  Outcome Measure Options: AM-PAC 6 Clicks Basic Mobility (PT)  AM-PAC 6 Clicks Score (PT): 18  AM-PAC 6 Clicks Score (OT): 16  Modified [place] Scale: 4 - Moderately severe disability.  Unable to walk without assistance, and unable to attend to own bodily needs without assistance.       Debo Roach, PT  3/3/2023      Electronically signed by Shanelle Roach, PT at 23 1056          Occupational Therapy Notes (most recent note)      Alejandra Jaimes, OT at 23 1350          Patient Name: Dorothy Zavala  : 1945    MRN: [id number]                              Today's Date: 3/1/2023       Admit Date: 2023    Visit Dx:     ICD-10-CM ICD-9-CM   1. Cognitive communication deficit  R41.841 799.52   2. Dysphagia, unspecified type  R13.10 787.20     Patient Active Problem List   Diagnosis   • Suspected acute ischemic stroke    • Hypertension   • Anxiety and depression   • Hypothyroidism   • Arthritis   • COPD   • B-cell lymphoma    • ? Subdural hematoma   • Frequent falls   • Left wrist fracture   • Illicit drug use (UDS + opiates, BZDs, and TCAs)   • Encephalopathy acute     Past Medical History:   Diagnosis Date   • Anxiety and depression 2023   • Arthritis 2023   • B-cell lymphoma  2023   • COPD 2023   • Hypertension 2023   • Hypothyroidism 2023     History reviewed. No pertinent surgical history.   General Information     Row Name 23 1433          OT Time and Intention    Document Type therapy note (daily note)  -AN     Mode of Treatment occupational therapy  -AN     Row Name 23          General Information    Patient Profile Reviewed yes  -AN     Existing Precautions/Restrictions fall;other (see comments)  NWB L  wrist in brace, ok to WB through elbow  -AN     Barriers to Rehab medically complex  -AN     Row Name 03/01/23 1433          Cognition    Orientation Status (Cognition) oriented x 4  -AN     Row Name 03/01/23 1433          Safety Issues, Functional Mobility    Safety Issues Affecting Function (Mobility) safety precautions follow-through/compliance;safety precaution awareness  -AN     Impairments Affecting Function (Mobility) balance;coordination;strength;pain;range of motion (ROM);endurance/activity tolerance;motor planning  -AN     Cognitive Impairments, Mobility Safety/Performance safety precaution awareness;safety precaution follow-through  -AN     Comment, Safety Issues/Impairments (Mobility) cues to maintain NWB of L wrist throughout with all functional tasks  -AN           User Key  (r) = Recorded By, (t) = Taken By, (c) = Cosigned By    Initials Name Provider Type    AN Alejandra Jaimes OT Occupational Therapist                 Mobility/ADL's     Row Name 03/01/23 1434          Bed Mobility    Bed Mobility supine-sit  -AN     Supine-Sit [place] (Bed Mobility) supervision;verbal cues  -AN     Bed Mobility, Safety Issues impaired trunk control for bed mobility;decreased use of arms for pushing/pulling  -AN     Assistive Device (Bed Mobility) head of bed elevated  -AN     Row Name 03/01/23 1434          Transfers    Transfers sit-stand transfer;stand-sit transfer;bed-chair transfer  -AN     Comment, (Transfers) cues to maintain NWB of L wrist with functional transfers  -AN     Row Name 03/01/23 1434          Bed-Chair Transfer    Bed-Chair [place] (Transfers) contact guard  -AN     Assistive Device (Bed-Chair Transfers) other (see comments)  RUE support  -AN     Row Name 03/01/23 1434          Sit-Stand Transfer    Sit-Stand [place] (Transfers) contact guard;verbal cues  -AN     Row Name 03/01/23 1434          Stand-Sit Transfer    Stand-Sit [place] (Transfers) contact guard;verbal cues   -AN     Row Name 03/01/23 1434          Functional Mobility    Functional Mobility- Ind. Level contact guard assist  -AN     Functional Mobility-Distance (Feet) --  household distance  -AN     Functional Mobility- Comment pt ambulated household distance in prep for sink side ADLs requiring RUE assist for stability  -AN     Row Name 03/01/23 1434          Activities of Daily Living    BADL Assessment/Intervention upper body dressing;grooming  -AN     Row Name 03/01/23 1434          Lower Body Dressing Assessment/Training    [place] Level (Lower Body Dressing) don;socks;dependent (less than 25% patient effort)  -AN     Position (Lower Body Dressing) edge of bed sitting  -AN     Row Name 03/01/23 1434          Upper Body Dressing Assessment/Training    [place] Level (Upper Body Dressing) don;minimum assist (75% patient effort)  gown  -AN     Position (Upper Body Dressing) edge of bed sitting  -AN     Row Name 03/01/23 1434          Grooming Assessment/Training    [place] Level (Grooming) oral care regimen;standby assist  -AN     Position (Grooming) sink side  -AN           User Key  (r) = Recorded By, (t) = Taken By, (c) = Cosigned By    Initials Name Provider Type    AN Alejandra Jaimes OT Occupational Therapist               Obj/Interventions     Row Name 03/01/23 1438          Balance    Balance Assessment sitting static balance;sitting dynamic balance;sit to stand dynamic balance;standing static balance;standing dynamic balance  -AN     Static Sitting Balance supervision  -AN     Dynamic Sitting Balance supervision  -AN     Position, Sitting Balance sitting edge of bed  -AN     Sit to Stand Dynamic Balance verbal cues;contact guard  -AN     Static Standing Balance contact guard  -AN     Dynamic Standing Balance contact guard;verbal cues  -AN     Position/Device Used, Standing Balance supported  -AN     Balance Interventions standing;sit to stand;supported;static;dynamic;minimal challenge;occupation  based/functional task  -AN           User Key  (r) = Recorded By, (t) = Taken By, (c) = Cosigned By    Initials Name Provider Type    Alejandra Calderon, OT Occupational Therapist               Goals/Plan    No documentation.                Clinical Impression     Row Name 03/01/23 1439          Pain Assessment    Additional Documentation Pain Scale: FACES Pre/Post-Treatment (Group)  -AN     Row Name 03/01/23 1439          Pain Scale: FACES Pre/Post-Treatment    Pain: FACES Scale, Pretreatment 2-->hurts little bit  -AN     Posttreatment Pain Rating 2-->hurts little bit  -AN     Pain Location - Side/Orientation Left  -AN     Pain Location upper  -AN     Pain Location - wrist  -AN     Pre/Posttreatment Pain Comment tolerated  -AN     Row Name 03/01/23 1439          Plan of Care Review    Plan of Care Reviewed With patient  -AN     Progress improving  -AN     Outcome Evaluation Pt continues to be limited by impaired balance and decreased activity tolerance warranting skilled OT services. Pt requires frequent cues to maintain NWB of LUE throughout with functional tasks. Cont POC.  -AN     Row Name 03/01/23 1439          Therapy Plan Review/Discharge Plan (OT)    Anticipated Discharge Disposition (OT) skilled nursing facility  -AN     Row Name 03/01/23 1439          Vital Signs    Pre Systolic BP Rehab --  VSS  -AN     O2 Delivery Pre Treatment room air  -AN     O2 Delivery Intra Treatment room air  -AN     O2 Delivery Post Treatment room air  -AN     Pre Patient Position Supine  -AN     Intra Patient Position Standing  -AN     Post Patient Position Sitting  -AN     Row Name 03/01/23 1439          Positioning and Restraints    Pre-Treatment Position in bed  -AN     Post Treatment Position chair  -AN     In Chair notified nsg;reclined;call light within reach;exit alarm on;encouraged to call for assist;legs elevated;waffle cushion  -AN           User Key  (r) = Recorded By, (t) = Taken By, (c) = Cosigned By    Initials  Name Provider Type    Alejandra Calderon OT Occupational Therapist               Outcome Measures     Row Name 03/01/23 1455          How much help from another is currently needed...    Putting on and taking off regular lower body clothing? 2  -AN     Bathing (including washing, rinsing, and drying) 2  -AN     Toileting (which includes using toilet bed pan or urinal) 3  -AN     Putting on and taking off regular upper body clothing 3  -AN     Taking care of personal grooming (such as brushing teeth) 3  -AN     Eating meals 3  -AN     AM-PAC 6 Clicks Score (OT) 16  -AN     Row Name 03/01/23 1455          Functional Assessment    Outcome Measure Options AM-PAC 6 Clicks Daily Activity (OT)  -AN           User Key  (r) = Recorded By, (t) = Taken By, (c) = Cosigned By    Initials Name Provider Type    Alejandra Calderon OT Occupational Therapist                Occupational Therapy Education     Title: PT OT SLP Therapies (In Progress)     Topic: Occupational Therapy (In Progress)     Point: ADL training (Done)     Description:   Instruct learner(s) on proper safety adaptation and remediation techniques during self care or transfers.   Instruct in proper use of assistive devices.              Learning Progress Summary           Patient Acceptance, E, VU by AN at 3/1/2023 1455    Acceptance, E, VU by AN at 2/28/2023 1025    Acceptance, E, NR by  at 2/24/2023 1425                   Point: Home exercise program (Not Started)     Description:   Instruct learner(s) on appropriate technique for monitoring, assisting and/or progressing therapeutic exercises/activities.              Learner Progress:  Not documented in this visit.          Point: Precautions (Done)     Description:   Instruct learner(s) on prescribed precautions during self-care and functional transfers.              Learning Progress Summary           Patient Acceptance, E, VU by AN at 3/1/2023 1455    Acceptance, E, VU by AN at 2/28/2023 1025     Acceptance, E, NR by  at 2/24/2023 1425                   Point: Body mechanics (Done)     Description:   Instruct learner(s) on proper positioning and spine alignment during self-care, functional mobility activities and/or exercises.              Learning Progress Summary           Patient Acceptance, E, VU by AN at 3/1/2023 1455    Acceptance, E, VU by  at 2/28/2023 1025    Acceptance, E, NR by  at 2/24/2023 1425                               User Key     Initials Effective Dates Name Provider Type Discipline     09/02/21 -  Maci Flores OT Occupational Therapist OT    UTE 09/21/21 -  Alejandra Jaimes OT Occupational Therapist OT              OT Recommendation and Plan     Plan of Care Review  Plan of Care Reviewed With: patient  Progress: improving  Outcome Evaluation: Pt continues to be limited by impaired balance and decreased activity tolerance warranting skilled OT services. Pt requires frequent cues to maintain NWB of LUE throughout with functional tasks. Cont POC.     Time Calculation:    Time Calculation- OT     Row Name 03/01/23 1455             Time Calculation- OT    OT Start Time 1350  -AN      OT Received On 03/01/23  -AN         Timed Charges    66536 - OT Self Care/Mgmt Minutes 15  -AN         Total Minutes    Timed Charges Total Minutes 15  -AN       Total Minutes 15  -AN            User Key  (r) = Recorded By, (t) = Taken By, (c) = Cosigned By    Initials Name Provider Type    AN Alejandra Jaimes OT Occupational Therapist              Therapy Charges for Today     Code Description Service Date Service Provider Modifiers Qty    38386605856 HC OT SELF CARE/MGMT/TRAIN EA 15 MIN 2/28/2023 Alejandra Jaimes OT GO 2    91433247848 HC OT SELF CARE/MGMT/TRAIN EA 15 MIN 3/1/2023 Alejandra Jaimes OT GO 1               Alejandra Jaimes OT  3/1/2023    Electronically signed by Alejandra Jaimes OT at 03/01/23 1451

## 2023-03-07 PROCEDURE — 97535 SELF CARE MNGMENT TRAINING: CPT

## 2023-03-07 PROCEDURE — 97116 GAIT TRAINING THERAPY: CPT

## 2023-03-07 PROCEDURE — 97530 THERAPEUTIC ACTIVITIES: CPT

## 2023-03-07 PROCEDURE — 99231 SBSQ HOSP IP/OBS SF/LOW 25: CPT | Performed by: INTERNAL MEDICINE

## 2023-03-07 RX ADMIN — ATORVASTATIN CALCIUM 80 MG: 40 TABLET, FILM COATED ORAL at 21:10

## 2023-03-07 RX ADMIN — PANTOPRAZOLE SODIUM 40 MG: 40 TABLET, DELAYED RELEASE ORAL at 05:12

## 2023-03-07 RX ADMIN — HYDROXYZINE HYDROCHLORIDE 25 MG: 25 TABLET ORAL at 21:13

## 2023-03-07 RX ADMIN — Medication 10 MG: at 21:10

## 2023-03-07 RX ADMIN — SERTRALINE HYDROCHLORIDE 50 MG: 50 TABLET ORAL at 08:54

## 2023-03-07 RX ADMIN — ASPIRIN 81 MG CHEWABLE TABLET 81 MG: 81 TABLET CHEWABLE at 08:54

## 2023-03-07 RX ADMIN — LEVOTHYROXINE SODIUM 100 MCG: 0.1 TABLET ORAL at 05:12

## 2023-03-07 RX ADMIN — AMLODIPINE BESYLATE 5 MG: 5 TABLET ORAL at 08:54

## 2023-03-07 RX ADMIN — OXYCODONE HYDROCHLORIDE AND ACETAMINOPHEN 1 TABLET: 5; 325 TABLET ORAL at 13:21

## 2023-03-07 RX ADMIN — OXYCODONE HYDROCHLORIDE AND ACETAMINOPHEN 1 TABLET: 5; 325 TABLET ORAL at 09:02

## 2023-03-07 RX ADMIN — OXYCODONE HYDROCHLORIDE AND ACETAMINOPHEN 1 TABLET: 5; 325 TABLET ORAL at 17:38

## 2023-03-07 RX ADMIN — OXYCODONE HYDROCHLORIDE AND ACETAMINOPHEN 1 TABLET: 5; 325 TABLET ORAL at 21:10

## 2023-03-07 NOTE — PLAN OF CARE
Goal Outcome Evaluation:  Plan of Care Reviewed With: patient        Progress: improving  Outcome Evaluation: Patient able to advance ambulation to 125' with SPC CGA, no losses of balance noted. Distance limited by reports of fatigue. Cont IP PT POC.

## 2023-03-07 NOTE — PROGRESS NOTES
[facility] Medicine Services  PROGRESS NOTE    Patient Name: Dorothy Zavala  : 1945  MRN: [id number]    Date of Admission: 2023  Primary Care Physician: Provider, No Known    Subjective   Subjective     CC: f/u AMS    HPI: Up in bed. Doing very well. Eating more. Walking better. In better mood.    ROS:  Gen- No fevers, chills  CV- No chest pain, palpitations  Resp- No cough, dyspnea  GI- No N/V/D, abd pain     Objective   Objective     Vital Signs:   Temp:  [98.1 °F (36.7 °C)-98.3 °F (36.8 °C)] 98.2 °F (36.8 °C)  Heart Rate:  [86] 86  Resp:  [17-18] 18  BP: (134-146)/(72-97) 141/97     Physical Exam:  Constitutional: No acute distress, awake, alert  HENT: NCAT, mucous membranes moist  Respiratory: Clear to auscultation bilaterally, respiratory effort normal   Cardiovascular: RRR, no murmurs, rubs, or gallops  Gastrointestinal: Positive bowel sounds, soft, nontender, nondistended  Musculoskeletal: No bilateral ankle edema  Psychiatric: Appropriate affect, cooperative  Neurologic: Oriented x 3, strength symmetric in all extremities, Cranial Nerves grossly intact to confrontation, speech clear  Skin: No rashes    Results Reviewed:  LAB RESULTS:      Lab 23  0724 23  0623  0529   WBC 11.16* 11.09* 10.79   HEMOGLOBIN 13.4 13.8 13.8   HEMATOCRIT 42.2 43.6 43.5   PLATELETS 298 273 264   MCV 91.7 92.8 94.0         Lab 23  0724 23  0623  0529   SODIUM 138 138 140   POTASSIUM 4.5 4.4 4.2   CHLORIDE 101 102 105   CO2 24.0 27.0 24.0   ANION GAP 13.0 9.0 11.0   BUN 15 14 11   CREATININE 0.64 0.65 0.55*   EGFR 92.3 91.9 95.7   GLUCOSE 98 95 97   CALCIUM 9.4 9.0 8.5*         Lab 23  0724 23  0600 23  0529   TOTAL PROTEIN 6.6 6.1 6.1   ALBUMIN 3.9 3.7 3.8   GLOBULIN 2.7 2.4 2.3   ALT (SGPT) 18 22 22   AST (SGOT) 20 21 31   BILIRUBIN 0.5 0.4 0.3   ALK PHOS 83 78 80                     Brief Urine Lab Results  (Last result in the past 365  days)      Color   Clarity   Blood   Leuk Est   Nitrite   Protein   CREAT   Urine HCG        02/24/23 1449 Yellow   Cloudy   Negative   Negative   Negative   Trace                 Microbiology Results Abnormal     Procedure Component Value - Date/Time    Fungus Culture - Cerebrospinal Fluid, Lumbar Puncture [[id number]]  (Normal) Collected: 02/27/23 1246    Lab Status: Preliminary result Specimen: Cerebrospinal Fluid from Lumbar Puncture Updated: 03/06/23 1345     Fungus Culture No fungus isolated at 1 week    Culture, CSF - Cerebrospinal Fluid, Lumbar Puncture [[id number]] Collected: 02/27/23 1246    Lab Status: Final result Specimen: Cerebrospinal Fluid from Lumbar Puncture Updated: 03/02/23 1008     CSF Culture No growth at 3 days     Gram Stain Rare (1+) WBCs seen      No organisms seen    Blood Culture - Blood, Hand, Right [[id number]]  (Normal) Collected: 02/23/23 2122    Lab Status: Final result Specimen: Blood from Hand, Right Updated: 02/28/23 2146     Blood Culture No growth at 5 days    Narrative:      Aerobic bottle only      Blood Culture - Blood, Hand, Left [[id number]]  (Normal) Collected: 02/23/23 2122    Lab Status: Final result Specimen: Blood from Hand, Left Updated: 02/28/23 2146     Blood Culture No growth at 5 days    Narrative:      Aerobic bottle only      Meningitis / Encephalitis Panel, PCR - Cerebrospinal Fluid, Lumbar Puncture [[id number]]  (Normal) Collected: 02/27/23 1246    Lab Status: Final result Specimen: Cerebrospinal Fluid from Lumbar Puncture Updated: 02/27/23 1458     ESCHERICHIA COLI K1, PCR Not Detected     HAEMOPHILUS INFLUENZAE, PCR Not Detected     LISTERIA MONOCYTOGENES, PCR Not Detected     NEISSERIA MENINGITIDIS, PCR Not Detected     STREPTOCOCCUS AGALACTIAE, PCR Not Detected     STREPTOCOCCUS PNEUMONIAE, PCR Not Detected     CYTOMEGALOVIRUS (CMV), PCR Not Detected     ENTEROVIRUS, PCR Not Detected     HERPES SIMPLEX VIRUS 1 (HSV-1), PCR Not Detected     HERPES SIMPLEX VIRUS  2 (HSV-2), PCR Not Detected     HUMAN PARECHOVIRUS, PCR Not Detected     VARICELLA ZOSTER VIRUS (VZV), PCR Not Detected     CRYPTOCOCCUS NEOFORMANS / GATTII, PCR Not Detected     HUMAN HERPES VIRUS 6 PCR Not Detected    Cryptococcal AG, CSF - Cerebrospinal Fluid, Lumbar Puncture [[id number]]  (Normal) Collected: 02/27/23 1246    Lab Status: Final result Specimen: Cerebrospinal Fluid from Lumbar Puncture Updated: 02/27/23 1435     Cryptococcal Antigen, CSF Negative          No radiology results from the last 24 hrs    Results for orders placed during the hospital encounter of 02/23/23    Adult Transthoracic Echo Complete W/ Cont if Necessary Per Protocol (With Agitated Saline)    Interpretation Summary  •  Left ventricular systolic function is normal. Calculated left ventricular EF = 67.2%  •  Left ventricular diastolic function was normal.  •  Saline test results are negative for right to left atrial level shunt.  •  Estimated right ventricular systolic pressure from tricuspid regurgitation is normal (<35 mmHg).      Current medications:  Scheduled Meds:amLODIPine, 5 mg, Oral, Q24H  aspirin, 81 mg, Oral, Daily   Or  aspirin, 300 mg, Rectal, Daily  atorvastatin, 80 mg, Oral, Nightly  levothyroxine, 100 mcg, Oral, Q AM  melatonin, 10 mg, Oral, Nightly  pantoprazole, 40 mg, Oral, Q AM  sertraline, 50 mg, Oral, Daily  sodium chloride, 10 mL, Intravenous, Q12H      Continuous Infusions:   PRN Meds:.•  hydrOXYzine  •  Magnesium Standard Dose Replacement - Initiate Nurse / BPA Driven Protocol  •  oxyCODONE-acetaminophen  •  Phosphorus Replacement - Initiate Nurse / BPA Driven Protocol  •  Potassium Replacement - Initiate Nurse / BPA Driven Protocol  •  sodium chloride  •  sodium chloride    Assessment & Plan   Assessment & Plan     Active Hospital Problems    Diagnosis  POA   • **Encephalopathy acute [G93.40]  Yes   • Suspected acute ischemic stroke  [I63.9]  Yes   • Hypertension [I10]  Yes   • Anxiety and depression  "[F41.9, F32.A]  Yes   • Hypothyroidism [E03.9]  Yes   • Arthritis [M19.90]  Yes   • COPD [J44.9]  Yes   • B-cell lymphoma  [C85.90]  Yes   • ? Subdural hematoma [S06.5XAA]  Yes   • Frequent falls [R29.6]  Not Applicable   • Left wrist fracture [S62.102A]  Yes   • Illicit drug use (UDS + opiates, BZDs, and TCAs) [F19.90]  Yes      Resolved Hospital Problems   No resolved problems to display.        Brief Hospital Course to date:  Dorothy Zavala is a 77 y.o. 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 Dukes in [facility]), current falls with numerous fractures, who presents to [facility] on 2/23/2023 was transferred from [facility] for evaluation of stroke.Stroke imaging was negative. Per report, history from son (who is apparently estranged from his mother) suggests that she has had declining neurologic status. Patient started on antibiotics initially for empiric coverage of meningitis per neurology.  They no longer feels her symptoms are concerning for meningitis and their note states that it is okay to stop antibiotics, off antibiotics and observing. Encephalopathy is improved but neurology concerned for chronic meningitis. LP in IR completed which was negative.        Encephalopathy  - Improved. Possible alcohol/medication withdrawal. She is back to baseline.   - Neurology concerned for chronic meningitis. LP per IR on 2/27/23 stable- culture negative at 48 hrs and meningitis/encephalitis panel negative. Improving off abx.  - PT/OT/SLP, referrals pending.     Depression  - Patient complaining of depression but states she no longer wants to take her Trazodone. Long d/w her son who thinks depression is playing a large role in her decline particularly due to recent passing of her brother and recent loss of her house. He asks that we be \"tough on " "her\" otherwise she will not do much for herself.  - Has been started on Zoloft 50mg PO daily, increase as tolerated as outpatient.     HTN  - Better when she was taking amlodipine however she is currently refusing antihypertensives.      CVA/TIA ruled out.  - Continue asa/statin     Hypothyroidism  - Continue synthroid     Lymphoma  - Needs oncology follow up     Wrist fracture, L  - Seen by orthopedic surgery, Dr. Ramos-  Recommends patient return in 1 to 2 weeks to clinic for repeat radiographs.  Okay for weightbearing through elbow left upper extremity, otherwise nonweightbearing left upper extremity     Have spoken with her son Asa, all questions answered. Updated on POC, medical readiness for rehab.      Expected Discharge Location and Transportation:   Expected Discharge   Expected Discharge Date and Time     Expected Discharge Date Expected Discharge Time    Mar 8, 2023            DVT prophylaxis:  Mechanical DVT prophylaxis orders are present.     AM-PAC 6 Clicks Score (PT): 20 (03/07/23 0830)    CODE STATUS:   Code Status and Medical Interventions:   Ordered at: 02/23/23 1517     Code Status (Patient has no pulse and is not breathing):    CPR (Attempt to Resuscitate)     Medical Interventions (Patient has pulse or is breathing):    Full Support       Tegan Up II, DO  03/07/23    "

## 2023-03-07 NOTE — PLAN OF CARE
Goal Outcome Evaluation:  Plan of Care Reviewed With: patient        Progress: improving  Outcome Evaluation: Pt demonstrating improvements w/ bed mobility - SUP w/ v/c to adhere to NWB on the LUE. SUP for STS from EOB to upright. Pt completed HH distances of functional mobility w/ SPC. Pt educated on AROM of fingers on LUE for ROM and edema management. Continue to progress as able per current POC.

## 2023-03-07 NOTE — CASE MANAGEMENT/SOCIAL WORK
Discharge Planning Assessment  [facility]     Patient Name: Dorothy Zavala  MRN: [id number]  Today's Date: 3/7/2023    Admit Date: 2/23/2023    Plan: [facility]                 Discharge Plan     Row Name 03/07/23 1231       Plan    Plan [facility]    Patient/Family in Agreement with Plan yes    Plan Comments Followed up with Ms. Zavala at the bedside for discharge planning.    Ms. Zavala met with the [facility] liaison, Jacqueline, today for a bedside evaluation for admission.  The facility was hesistant to accept the patient due to her hx of drug use.  After meeting patient, Jacqueline states that the facility will likely be able to accept Ms. Zavala, but she will let CM know after she returns to facility and meets with team.  Once accepted, a prior auth with Ms. Zavala's [employer] will be required for the rehab admission.    Ms. Zavala continues to be agreeable to DC plan.    CM will follow up.    Final Discharge Disposition Code 03 - skilled nursing facility (SNF)              Continued Care and Services - Admitted Since 2/23/2023     Destination     Service Provider Request Status Selected Services Address Phone Fax Patient Preferred    [facility] Considering N/A [address], [facility] [address] [phone] --              Expected Discharge Date and Time     Expected Discharge Date Expected Discharge Time    Mar 9, 2023                    Marta Kiran RN

## 2023-03-07 NOTE — PLAN OF CARE
Goal Outcome Evaluation:  Plan of Care Reviewed With: patient        Progress: improving  Outcome Evaluation: Patient A&Ox4, calm and cooperative with care.  Non-tele.  Ambulates to BR with cane and standby assistance.  Left arm in splint.  Alexandro score = 18 with waffle mattress intact, turn PRN.  Falls score = 18 with bed alarm and pad alarm intact.  Complaints of sharp pain and throbbing at times to left wrist.  No new complaints or concerns voiced.

## 2023-03-07 NOTE — THERAPY TREATMENT NOTE
Patient Name: Dorothy Zavala  : 1945    MRN: [id number]                              Today's Date: 3/7/2023       Admit Date: 2023    Visit Dx:     ICD-10-CM ICD-9-CM   1. Cognitive communication deficit  R41.841 799.52   2. Dysphagia, unspecified type  R13.10 787.20     Patient Active Problem List   Diagnosis   • Suspected acute ischemic stroke    • Hypertension   • Anxiety and depression   • Hypothyroidism   • Arthritis   • COPD   • B-cell lymphoma    • ? Subdural hematoma   • Frequent falls   • Left wrist fracture   • Illicit drug use (UDS + opiates, BZDs, and TCAs)   • Encephalopathy acute     Past Medical History:   Diagnosis Date   • Anxiety and depression 2023   • Arthritis 2023   • B-cell lymphoma  2023   • COPD 2023   • Hypertension 2023   • Hypothyroidism 2023     History reviewed. No pertinent surgical history.   General Information     Row Name 23 1445          Physical Therapy Time and Intention    Document Type therapy note (daily note)  -LO     Mode of Treatment physical therapy  -LO     Row Name 23 1445          General Information    Patient Profile Reviewed yes  -LO     Existing Precautions/Restrictions fall;left;non-weight bearing;other (see comments)  NWB LUE  -LO     Row Name 23 1445          Cognition    Orientation Status (Cognition) oriented x 4  -LO     Row Name 23 1445          Safety Issues, Functional Mobility    Impairments Affecting Function (Mobility) balance;endurance/activity tolerance;strength  -LO     Cognitive Impairments, Mobility Safety/Performance safety precaution awareness;safety precaution follow-through  -LO     Comment, Safety Issues/Impairments (Mobility) continues to require cuing for maintaining NWB LUE  -LO           User Key  (r) = Recorded By, (t) = Taken By, (c) = Cosigned By    Initials Name Provider Type    LO Lynn Ferraro PT Physical Therapist               Mobility     Row Name 23 1447           Bed Mobility    Bed Mobility supine-sit  -LO     Scooting/Bridging [place] (Bed Mobility) supervision  -LO     Supine-Sit [place] (Bed Mobility) supervision  -LO     Sit-Supine [place] (Bed Mobility) supervision;verbal cues  -LO     Assistive Device (Bed Mobility) head of bed elevated  -LO     Comment, (Bed Mobility) no physical assist required  -LO     Row Name 03/07/23 1447          Transfers    Comment, (Transfers) vc for sequencing; EOB>SPC> commode in BR>SPC>recliner  -LO     Row Name 03/07/23 1447          Bed-Chair Transfer    Bed-Chair [place] (Transfers) contact guard  -LO     Row Name 03/07/23 1447          Sit-Stand Transfer    Sit-Stand [place] (Transfers) standby assist;verbal cues  -LO     Assistive Device (Sit-Stand Transfers) cane, straight  -LO     Row Name 03/07/23 1447          Gait/Stairs (Locomotion)    [place] Level (Gait) contact guard;verbal cues  -LO     Assistive Device (Gait) cane, straight  -LO     Distance in Feet (Gait) 125  -LO     Deviations/Abnormal Patterns (Gait) cuba decreased;stride length decreased;gait speed decreased  -LO     Bilateral Gait Deviations forward flexed posture  -LO     Comment, (Gait/Stairs) Patient ambulates with SPC CGA with reduced step length and height, no loss of balance noted. Distance limited by fatigue.  -LO     Row Name 03/07/23 1447          Mobility    Extremity Weight-bearing Status left upper extremity  -LO     Left Upper Extremity (Weight-bearing Status) non weight-bearing (NWB)  -LO           User Key  (r) = Recorded By, (t) = Taken By, (c) = Cosigned By    Initials Name Provider Type    Lynn Ken PT Physical Therapist               Obj/Interventions     Row Name 03/07/23 1504          Balance    Balance Assessment sitting static balance;sitting dynamic balance;standing static balance;standing dynamic balance  -LO     Static Sitting Balance independent  -LO     Dynamic Sitting Balance independent  -LO      Position, Sitting Balance unsupported;sitting edge of bed  -LO     Static Standing Balance standby assist  -LO     Dynamic Standing Balance contact guard  -LO     Position/Device Used, Standing Balance supported;cane, straight  -LO     Comment, Balance SPC and CGA for safety in standing  -LO           User Key  (r) = Recorded By, (t) = Taken By, (c) = Cosigned By    Initials Name Provider Type     Lynn Ferraro, PT Physical Therapist               Goals/Plan    No documentation.                Clinical Impression     Row Name 03/07/23 1503          Pain    Additional Documentation Pain Scale: FACES Pre/Post-Treatment (Group)  -     Row Name 03/07/23 1503          Pain Scale: FACES Pre/Post-Treatment    Pain: FACES Scale, Pretreatment 2-->hurts little bit  -LO     Posttreatment Pain Rating 2-->hurts little bit  -LO     Pain Location - Side/Orientation Left  -LO     Pain Location upper  -LO     Pain Location - wrist  -LO     Pre/Posttreatment Pain Comment RN premedicated  -     Row Name 03/07/23 1503          Plan of Care Review    Plan of Care Reviewed With patient  -LO     Progress improving  -LO     Outcome Evaluation Patient able to advance ambulation to 125' with SPC CGA, no losses of balance noted. Distance limited by reports of fatigue. Cont IP PT POC.  -     Row Name 03/07/23 1503          Therapy Assessment/Plan (PT)    Rehab Potential (PT) good, to achieve stated therapy goals  -LO     Criteria for Skilled Interventions Met (PT) yes;meets criteria;skilled treatment is necessary  -LO     Therapy Frequency (PT) daily  -     Row Name 03/07/23 1503          Vital Signs    Pre Systolic BP Rehab 165  -LO     Pre Treatment Diastolic BP 91  -LO     Post Systolic BP Rehab 189  -LO     Post Treatment Diastolic BP 95  -LO     O2 Delivery Pre Treatment room air  -LO     O2 Delivery Intra Treatment room air  -LO     O2 Delivery Post Treatment room air  -LO     Pre Patient Position Supine  -LO     Intra Patient  Position Standing  -LO     Post Patient Position Sitting  -LO     Row Name 03/07/23 1503          Positioning and Restraints    Pre-Treatment Position in bed  -LO     Post Treatment Position chair  -LO     In Chair notified nsg;reclined;call light within reach;encouraged to call for assist;exit alarm on;waffle cushion  -LO           User Key  (r) = Recorded By, (t) = Taken By, (c) = Cosigned By    Initials Name Provider Type    Lynn Ken, PT Physical Therapist               Outcome Measures     Row Name 03/07/23 1506 03/07/23 0830       How much help from another person do you currently need...    Turning from your back to your side while in flat bed without using bedrails? 4  -LO 4  -TG    Moving from lying on back to sitting on the side of a flat bed without bedrails? 4  -LO 4  -TG    Moving to and from a bed to a chair (including a wheelchair)? 3  -LO 3  -TG    Standing up from a chair using your arms (e.g., wheelchair, bedside chair)? 4  -LO 3  -TG    Climbing 3-5 steps with a railing? 3  -LO 3  -TG    To walk in hospital room? 3  -LO 3  -TG    AM-PAC 6 Clicks Score (PT) 21  -LO 20  -TG    Highest level of mobility 6 --> Walked 10 steps or more  -LO 6 --> Walked 10 steps or more  -TG    Row Name 03/07/23 1506 03/07/23 1314       Functional Assessment    Outcome Measure Options AM-PAC 6 Clicks Basic Mobility (PT)  -LO AM-PAC 6 Clicks Daily Activity (OT)  -MR          User Key  (r) = Recorded By, (t) = Taken By, (c) = Cosigned By    Initials Name Provider Type    TG Dasha Bray, RN Registered Nurse    Lynn Ken, PT Physical Therapist    Sarah Greenwood, OT Occupational Therapist                             Physical Therapy Education     Title: PT OT SLP Therapies (Done)     Topic: Physical Therapy (Done)     Point: Mobility training (Done)     Learning Progress Summary           Patient Acceptance, E, VU,NR by SASCHA at 3/7/2023 1319    Comment: PT POC    Acceptance, E,TB, VU by LAZARO at 3/6/2023 1418     Comment: cotninued benefit of skilled PT services    Acceptance, E, VU,NR by KG at 3/3/2023 0846    Acceptance, E, VU by SJ at 3/1/2023 1623    Acceptance, E, NR by AS at 2/27/2023 0958    Acceptance, E, VU by CM at 2/24/2023 1449   Family Acceptance, E, VU by CM at 2/24/2023 1449                   Point: Home exercise program (Done)     Learning Progress Summary           Patient Acceptance, E, VU,NR by LO at 3/7/2023 1319    Comment: PT POC    Acceptance, E,TB, VU by KW at 3/6/2023 1418    Comment: cotninued benefit of skilled PT services    Acceptance, E, VU,NR by KG at 3/3/2023 0846    Acceptance, E, VU by SJ at 3/1/2023 1623    Acceptance, E, NR by AS at 2/27/2023 0958                   Point: Body mechanics (Done)     Learning Progress Summary           Patient Acceptance, E, VU,NR by LO at 3/7/2023 1319    Comment: PT POC    Acceptance, E,TB, VU by KW at 3/6/2023 1418    Comment: cotninued benefit of skilled PT services    Acceptance, E, VU,NR by KG at 3/3/2023 0846    Acceptance, E, VU by SJ at 3/1/2023 1623    Acceptance, E, NR by AS at 2/27/2023 0958                   Point: Precautions (Done)     Learning Progress Summary           Patient Acceptance, E, VU,NR by LO at 3/7/2023 1319    Comment: PT POC    Acceptance, E,TB, VU by KW at 3/6/2023 1418    Comment: cotninued benefit of skilled PT services    Acceptance, E, VU,NR by KG at 3/3/2023 0846    Acceptance, E, VU by SJ at 3/1/2023 1623    Acceptance, E, NR by AS at 2/27/2023 0958    Acceptance, E, VU by CM at 2/24/2023 1449   Family Acceptance, E, VU by CM at 2/24/2023 1449                               User Key     Initials Effective Dates Name Provider Type Discipline     02/03/23 -  Alyssia Saini, PT Physical Therapist PT    AS 02/03/23 -  Danisha Rai, PTA Physical Therapist Assistant PT    KG 05/22/20 -  Shanelle Roach, PT Physical Therapist PT    LO 06/16/21 -  Lynn Ferraro, PT Physical Therapist PT    KW 01/27/22 -   Hailey Zimmerman, PT Physical Therapist PT    CM 09/22/22 -  Kiana Davis, PT Physical Therapist PT              PT Recommendation and Plan     Plan of Care Reviewed With: patient  Progress: improving  Outcome Evaluation: Patient able to advance ambulation to 125' with SPC CGA, no losses of balance noted. Distance limited by reports of fatigue. Cont IP PT POC.     Time Calculation:    PT Charges     Row Name 03/07/23 1319             Time Calculation    Start Time 1319  -LO      PT Received On 03/07/23  -LO      PT Goal Re-Cert Due Date 03/16/23  -LO         Timed Charges    16450 - Gait Training Minutes  14  -LO      61739 - PT Therapeutic Activity Minutes 10  -LO         Total Minutes    Timed Charges Total Minutes 24  -LO       Total Minutes 24  -LO            User Key  (r) = Recorded By, (t) = Taken By, (c) = Cosigned By    Initials Name Provider Type    Lynn Ken, PT Physical Therapist              Therapy Charges for Today     Code Description Service Date Service Provider Modifiers Qty    45416622430 HC GAIT TRAINING EA 15 MIN 3/7/2023 Lynn Ferraro, PT GP 1    21870855972 HC PT THERAPEUTIC ACT EA 15 MIN 3/7/2023 Lynn Ferraro, PT GP 1          PT G-Codes  Outcome Measure Options: AM-PAC 6 Clicks Basic Mobility (PT)  AM-PAC 6 Clicks Score (PT): 21  AM-PAC 6 Clicks Score (OT): 18  Modified Didier Scale: 4 - Moderately severe disability.  Unable to walk without assistance, and unable to attend to own bodily needs without assistance.  PT Discharge Summary  Anticipated Discharge Disposition (PT): skilled nursing facility    Lynn Ferraro PT  3/7/2023

## 2023-03-08 PROCEDURE — 97535 SELF CARE MNGMENT TRAINING: CPT

## 2023-03-08 PROCEDURE — 97530 THERAPEUTIC ACTIVITIES: CPT

## 2023-03-08 PROCEDURE — 99232 SBSQ HOSP IP/OBS MODERATE 35: CPT | Performed by: INTERNAL MEDICINE

## 2023-03-08 PROCEDURE — 92507 TX SP LANG VOICE COMM INDIV: CPT

## 2023-03-08 RX ADMIN — OXYCODONE HYDROCHLORIDE AND ACETAMINOPHEN 1 TABLET: 5; 325 TABLET ORAL at 13:08

## 2023-03-08 RX ADMIN — HYDROXYZINE HYDROCHLORIDE 25 MG: 25 TABLET ORAL at 20:35

## 2023-03-08 RX ADMIN — ASPIRIN 81 MG CHEWABLE TABLET 81 MG: 81 TABLET CHEWABLE at 08:50

## 2023-03-08 RX ADMIN — AMLODIPINE BESYLATE 5 MG: 5 TABLET ORAL at 08:50

## 2023-03-08 RX ADMIN — PANTOPRAZOLE SODIUM 40 MG: 40 TABLET, DELAYED RELEASE ORAL at 06:07

## 2023-03-08 RX ADMIN — SERTRALINE HYDROCHLORIDE 50 MG: 50 TABLET ORAL at 08:50

## 2023-03-08 RX ADMIN — ATORVASTATIN CALCIUM 80 MG: 40 TABLET, FILM COATED ORAL at 20:35

## 2023-03-08 RX ADMIN — LEVOTHYROXINE SODIUM 100 MCG: 0.1 TABLET ORAL at 06:07

## 2023-03-08 RX ADMIN — OXYCODONE HYDROCHLORIDE AND ACETAMINOPHEN 1 TABLET: 5; 325 TABLET ORAL at 06:07

## 2023-03-08 RX ADMIN — OXYCODONE HYDROCHLORIDE AND ACETAMINOPHEN 1 TABLET: 5; 325 TABLET ORAL at 20:35

## 2023-03-08 NOTE — PROGRESS NOTES
[facility] Medicine Services  PROGRESS NOTE    Patient Name: Dorothy Zavala  : 1945  MRN: [id number]    Date of Admission: 2023  Primary Care Physician: Provider, No Known    Subjective   Subjective     CC: f/u weakness    HPI: Had a good night. Resting comfortably this am. Pleased with her progress during stay.    ROS:  Gen- No fevers, chills  CV- No chest pain, palpitations  Resp- No cough, dyspnea  GI- No N/V/D, abd pain     Objective   Objective     Vital Signs:   Temp:  [97.9 °F (36.6 °C)-98.3 °F (36.8 °C)] 98.3 °F (36.8 °C)  Heart Rate:  [70-84] 70  Resp:  [16-18] 18  BP: (140-150)/(67-80) 140/67  Flow (L/min):  [1] 1     Physical Exam:  Constitutional: No acute distress, resting comfortably  HENT: NCAT, mucous membranes moist  Respiratory: Clear to auscultation bilaterally, respiratory effort normal   Cardiovascular: RRR, no murmurs, rubs, or gallops  Gastrointestinal: Positive bowel sounds, soft, nontender, nondistended  Musculoskeletal: No bilateral ankle edema  Psychiatric: Appropriate affect, cooperative  Neurologic: Oriented x 3, strength symmetric in all extremities, Cranial Nerves grossly intact to confrontation, speech clear  Skin: No rashes    Results Reviewed:  LAB RESULTS:      Lab 23  0724 23  0600   WBC 11.16* 11.09*   HEMOGLOBIN 13.4 13.8   HEMATOCRIT 42.2 43.6   PLATELETS 298 273   MCV 91.7 92.8         Lab 23  0724 23  0600   SODIUM 138 138   POTASSIUM 4.5 4.4   CHLORIDE 101 102   CO2 24.0 27.0   ANION GAP 13.0 9.0   BUN 15 14   CREATININE 0.64 0.65   EGFR 92.3 91.9   GLUCOSE 98 95   CALCIUM 9.4 9.0         Lab 23  0724 23  0600   TOTAL PROTEIN 6.6 6.1   ALBUMIN 3.9 3.7   GLOBULIN 2.7 2.4   ALT (SGPT) 18 22   AST (SGOT) 20 21   BILIRUBIN 0.5 0.4   ALK PHOS 83 78                     Brief Urine Lab Results  (Last result in the past 365 days)      Color   Clarity   Blood   Leuk Est   Nitrite   Protein   CREAT   Urine HCG         02/24/23 1449 Yellow   Cloudy   Negative   Negative   Negative   Trace                 Microbiology Results Abnormal     Procedure Component Value - Date/Time    Fungus Culture - Cerebrospinal Fluid, Lumbar Puncture [[id number]]  (Normal) Collected: 02/27/23 1246    Lab Status: Preliminary result Specimen: Cerebrospinal Fluid from Lumbar Puncture Updated: 03/06/23 1345     Fungus Culture No fungus isolated at 1 week    Culture, CSF - Cerebrospinal Fluid, Lumbar Puncture [[id number]] Collected: 02/27/23 1246    Lab Status: Final result Specimen: Cerebrospinal Fluid from Lumbar Puncture Updated: 03/02/23 1008     CSF Culture No growth at 3 days     Gram Stain Rare (1+) WBCs seen      No organisms seen    Blood Culture - Blood, Hand, Right [[id number]]  (Normal) Collected: 02/23/23 2122    Lab Status: Final result Specimen: Blood from Hand, Right Updated: 02/28/23 2146     Blood Culture No growth at 5 days    Narrative:      Aerobic bottle only      Blood Culture - Blood, Hand, Left [[id number]]  (Normal) Collected: 02/23/23 2122    Lab Status: Final result Specimen: Blood from Hand, Left Updated: 02/28/23 2146     Blood Culture No growth at 5 days    Narrative:      Aerobic bottle only      Meningitis / Encephalitis Panel, PCR - Cerebrospinal Fluid, Lumbar Puncture [[id number]]  (Normal) Collected: 02/27/23 1246    Lab Status: Final result Specimen: Cerebrospinal Fluid from Lumbar Puncture Updated: 02/27/23 1458     ESCHERICHIA COLI K1, PCR Not Detected     HAEMOPHILUS INFLUENZAE, PCR Not Detected     LISTERIA MONOCYTOGENES, PCR Not Detected     NEISSERIA MENINGITIDIS, PCR Not Detected     STREPTOCOCCUS AGALACTIAE, PCR Not Detected     STREPTOCOCCUS PNEUMONIAE, PCR Not Detected     CYTOMEGALOVIRUS (CMV), PCR Not Detected     ENTEROVIRUS, PCR Not Detected     HERPES SIMPLEX VIRUS 1 (HSV-1), PCR Not Detected     HERPES SIMPLEX VIRUS 2 (HSV-2), PCR Not Detected     HUMAN PARECHOVIRUS, PCR Not Detected     VARICELLA ZOSTER  VIRUS (VZV), PCR Not Detected     CRYPTOCOCCUS NEOFORMANS / GATTII, PCR Not Detected     HUMAN HERPES VIRUS 6 PCR Not Detected    Cryptococcal AG, CSF - Cerebrospinal Fluid, Lumbar Puncture [[id number]]  (Normal) Collected: 02/27/23 1246    Lab Status: Final result Specimen: Cerebrospinal Fluid from Lumbar Puncture Updated: 02/27/23 1435     Cryptococcal Antigen, CSF Negative          No radiology results from the last 24 hrs    Results for orders placed during the hospital encounter of 02/23/23    Adult Transthoracic Echo Complete W/ Cont if Necessary Per Protocol (With Agitated Saline)    Interpretation Summary  •  Left ventricular systolic function is normal. Calculated left ventricular EF = 67.2%  •  Left ventricular diastolic function was normal.  •  Saline test results are negative for right to left atrial level shunt.  •  Estimated right ventricular systolic pressure from tricuspid regurgitation is normal (<35 mmHg).      Current medications:  Scheduled Meds:amLODIPine, 5 mg, Oral, Q24H  aspirin, 81 mg, Oral, Daily   Or  aspirin, 300 mg, Rectal, Daily  atorvastatin, 80 mg, Oral, Nightly  levothyroxine, 100 mcg, Oral, Q AM  melatonin, 10 mg, Oral, Nightly  pantoprazole, 40 mg, Oral, Q AM  sertraline, 50 mg, Oral, Daily  sodium chloride, 10 mL, Intravenous, Q12H      Continuous Infusions:   PRN Meds:.•  hydrOXYzine  •  Magnesium Standard Dose Replacement - Initiate Nurse / BPA Driven Protocol  •  oxyCODONE-acetaminophen  •  Phosphorus Replacement - Initiate Nurse / BPA Driven Protocol  •  Potassium Replacement - Initiate Nurse / BPA Driven Protocol  •  sodium chloride  •  sodium chloride    Assessment & Plan   Assessment & Plan     Active Hospital Problems    Diagnosis  POA   • **Encephalopathy acute [G93.40]  Yes   • Suspected acute ischemic stroke  [I63.9]  Yes   • Hypertension [I10]  Yes   • Anxiety and depression [F41.9, F32.A]  Yes   • Hypothyroidism [E03.9]  Yes   • Arthritis [M19.90]  Yes   • COPD [J44.9]   Yes   • B-cell lymphoma  [C85.90]  Yes   • ? Subdural hematoma [S06.5XAA]  Yes   • Frequent falls [R29.6]  Not Applicable   • Left wrist fracture [S62.102A]  Yes   • Illicit drug use (UDS + opiates, BZDs, and TCAs) [F19.90]  Yes      Resolved Hospital Problems   No resolved problems to display.        Brief Hospital Course to date:  Dorothy Zavala is a 77 y.o. 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 Dukes in [facility]), current falls with numerous fractures, who presents to [facility] on 2/23/2023 was transferred from [facility] for evaluation of stroke.Stroke imaging was negative. Per report, history from son (who is apparently estranged from his mother) suggests that she has had declining neurologic status. Patient started on antibiotics initially for empiric coverage of meningitis per neurology.  They no longer feels her symptoms are concerning for meningitis and their note states that it is okay to stop antibiotics, off antibiotics and observing. Encephalopathy is improved but neurology concerned for chronic meningitis. LP in IR completed which was negative. She continues to improve with ongoing PT/OT.    This patient's problems and plans were partially entered by my partner and updated as appropriate by me 03/08/23.     Encephalopathy  - Improved. Possible alcohol/medication withdrawal. She is back to baseline.   - Neurology concerned was initially concerned for chronic meningitis. LP per IR on 2/27/23 stable- culture negative at 48 hrs and meningitis/encephalitis panel negative. Improving off abx.  - PT/OT/SLP, referrals pending.     Depression  - Patient complaining of depression but states she no longer wants to take her Trazodone. Long d/w her son who thinks depression is playing a large role in her decline particularly due to  "recent passing of her brother and recent loss of her house. He asks that we be \"tough on her\" otherwise she will not do much for herself.  - Has been started on Zoloft 50mg PO daily which seems to have helped some. Increase as tolerated as outpatient.     HTN  - Better when she was taking amlodipine however she is currently refusing antihypertensives.      CVA/TIA ruled out.  - Continue asa/statin     Hypothyroidism  - Continue synthroid     Lymphoma  - Needs oncology follow up     Wrist fracture, L  - Seen by orthopedic surgery, Dr. Ramos-  Recommends patient return in 1 to 2 weeks to clinic for repeat radiographs.  Okay for weightbearing through elbow left upper extremity, otherwise nonweightbearing left upper extremity     Have spoken with her son Asa, all questions answered. Updated on POC, medical readiness for rehab.      Expected Discharge Location and Transportation:   Expected Discharge   Expected Discharge Date and Time     Expected Discharge Date Expected Discharge Time    Mar 11, 2023            DVT prophylaxis:  Mechanical DVT prophylaxis orders are present.     AM-PAC 6 Clicks Score (PT): 21 (03/07/23 1506)    CODE STATUS:   Code Status and Medical Interventions:   Ordered at: 02/23/23 1517     Code Status (Patient has no pulse and is not breathing):    CPR (Attempt to Resuscitate)     Medical Interventions (Patient has pulse or is breathing):    Full Support       Tegan Up II, DO  03/08/23    "

## 2023-03-08 NOTE — THERAPY TREATMENT NOTE
Patient Name: Dorothy Zavala  : 1945    MRN: [id number]                              Today's Date: 3/8/2023       Admit Date: 2023    Visit Dx:     ICD-10-CM ICD-9-CM   1. Cognitive communication deficit  R41.841 799.52   2. Dysphagia, unspecified type  R13.10 787.20     Patient Active Problem List   Diagnosis   • Suspected acute ischemic stroke    • Hypertension   • Anxiety and depression   • Hypothyroidism   • Arthritis   • COPD   • B-cell lymphoma    • ? Subdural hematoma   • Frequent falls   • Left wrist fracture   • Illicit drug use (UDS + opiates, BZDs, and TCAs)   • Encephalopathy acute     Past Medical History:   Diagnosis Date   • Anxiety and depression 2023   • Arthritis 2023   • B-cell lymphoma  2023   • COPD 2023   • Hypertension 2023   • Hypothyroidism 2023     History reviewed. No pertinent surgical history.   General Information     Row Name 23 1454          OT Time and Intention    Document Type therapy note (daily note)  -AN     Mode of Treatment occupational therapy  -AN     Row Name 23 1454          General Information    Patient Profile Reviewed yes  -AN     Existing Precautions/Restrictions fall;left;non-weight bearing;other (see comments)  NWB LUE in wrist splint AAT  -AN     Barriers to Rehab medically complex  -AN     Row Name 23 1454          Cognition    Orientation Status (Cognition) oriented x 4  -AN     Row Name 23 1454          Safety Issues, Functional Mobility    Impairments Affecting Function (Mobility) balance;endurance/activity tolerance;strength  -AN           User Key  (r) = Recorded By, (t) = Taken By, (c) = Cosigned By    Initials Name Provider Type    AN Alejandra Jaimes OT Occupational Therapist                 Mobility/ADL's     Row Name 23 1456          Bed Mobility    Comment, (Bed Mobility) deferred due to headache and dizziness  -AN     Row Name 23 1456          Transfers    Comment, (Transfers)  deferred due to headache and dizziness  -AN     Row Name 03/08/23 1456          Activities of Daily Living    BADL Assessment/Intervention upper body dressing  -AN     Row Name 03/08/23 1456          Mobility    Extremity Weight-bearing Status left upper extremity   -AN     Left Upper Extremity (Weight-bearing Status) non weight-bearing (NWB)   -AN     Row Name 03/08/23 1456          Upper Body Dressing Assessment/Training    [place] Level (Upper Body Dressing) don;minimum assist (75% patient effort);doff  splint  -AN     Position (Upper Body Dressing) sitting up in bed  -AN     Comment, (Upper Body Dressing) pt reporting increased pain; removed L wrist splint to perform skin check and ensured proper fitting with stockinette, reviewed wearing AAT; sent ortho MD a message questioning if they plan to re-apply cast or if they would like her to continue with wrist splint, awaiting response   -AN           User Key  (r) = Recorded By, (t) = Taken By, (c) = Cosigned By    Initials Name Provider Type    Alejandra Calderon OT Occupational Therapist               Obj/Interventions     Row Name 03/08/23 1502          Hand (Therapeutic Exercise)    Hand (Therapeutic Exercise) AROM (active range of motion)  -AN     Hand AROM/AAROM (Therapeutic Exercise) left;AROM (active range of motion);finger flexion;finger extension;10 repetitions  -AN     Row Name 03/08/23 1502          Motor Skills    Therapeutic Exercise other (see comments)  reviewed L hand ROM to reduce edema  -AN           User Key  (r) = Recorded By, (t) = Taken By, (c) = Cosigned By    Initials Name Provider Type    Alejandra Calderon OT Occupational Therapist               Goals/Plan    No documentation.                Clinical Impression     Row Name 03/08/23 1504          Pain Assessment    Additional Documentation Pain Scale: FACES Pre/Post-Treatment (Group)  -AN     [facility] Name 03/08/23 1504          Pain Scale: FACES Pre/Post-Treatment    Pain: FACES  Scale, Pretreatment 2-->hurts little bit  -AN     Posttreatment Pain Rating 2-->hurts little bit  -AN     Pain Location - Side/Orientation Left  -AN     Pain Location upper  -AN     Pain Location - extremity  -AN     Pre/Posttreatment Pain Comment tolerated  -AN     Row Name 03/08/23 1504          Plan of Care Review    Plan of Care Reviewed With patient  -AN     Progress no change  -AN     Outcome Evaluation Pt politely declined EOB and OOB activity due to dizziness and headache. Pt complaining of L wrist pain, reviewed splint wearing schedule, and removed to assess skin; no redness note. Donned with good fit noted and no complaints of pain. Cont POC.  -AN     Row Name 03/08/23 1504          Therapy Plan Review/Discharge Plan (OT)    Anticipated Discharge Disposition (OT) skilled nursing facility  -AN     Row Name 03/08/23 1504          Vital Signs    Pre Systolic BP Rehab --  VSS  -AN     O2 Delivery Pre Treatment room air  -AN     O2 Delivery Intra Treatment room air  -AN     O2 Delivery Post Treatment room air  -AN     Pre Patient Position Supine  -AN     Intra Patient Position Supine  -AN     Post Patient Position Supine  -AN     Row Name 03/08/23 1508          Positioning and Restraints    Pre-Treatment Position in bed  -AN     Post Treatment Position bed  -AN     In Bed notified nsg;supine;exit alarm on;encouraged to call for assist;call light within reach;side rails up x2;with brace  -AN           User Key  (r) = Recorded By, (t) = Taken By, (c) = Cosigned By    Initials Name Provider Type    Alejandra Calderon, MAXIMILIAN Occupational Therapist               Outcome Measures     Row Name 03/08/23 1921          How much help from another is currently needed...    Putting on and taking off regular lower body clothing? 2  -AN     Bathing (including washing, rinsing, and drying) 2  -AN     Toileting (which includes using toilet bed pan or urinal) 3  -AN     Putting on and taking off regular upper body clothing 3  -AN      Taking care of personal grooming (such as brushing teeth) 4  -AN     Eating meals 4  -AN     AM-PAC 6 Clicks Score (OT) 18  -AN     Row Name 03/08/23 1331          How much help from another person do you currently need...    Turning from your back to your side while in flat bed without using bedrails? 4  -AB     Moving from lying on back to sitting on the side of a flat bed without bedrails? 4  -AB     Moving to and from a bed to a chair (including a wheelchair)? 3  -AB     Standing up from a chair using your arms (e.g., wheelchair, bedside chair)? 4  -AB     Climbing 3-5 steps with a railing? 3  -AB     To walk in hospital room? 3  -AB     AM-PAC 6 Clicks Score (PT) 21  -AB     Highest level of mobility 6 --> Walked 10 steps or more  -AB     Row Name 03/08/23 1511 03/08/23 1331       Functional Assessment    Outcome Measure Options AM-PAC 6 Clicks Daily Activity (OT)  -AN AM-PAC 6 Clicks Basic Mobility (PT)  -AB          User Key  (r) = Recorded By, (t) = Taken By, (c) = Cosigned By    Initials Name Provider Type    Alejandra Calderon OT Occupational Therapist    Danisha Bradshaw PT Physical Therapist                Occupational Therapy Education     Title: PT OT SLP Therapies (Done)     Topic: Occupational Therapy (Done)     Point: ADL training (Done)     Description:   Instruct learner(s) on proper safety adaptation and remediation techniques during self care or transfers.   Instruct in proper use of assistive devices.              Learning Progress Summary           Patient Acceptance, E, VU by AN at 3/8/2023 1511    Acceptance, E, VU by MR at 3/7/2023 1315    Acceptance, E, VU by AN at 3/1/2023 1455    Acceptance, E, VU by AN at 2/28/2023 1025    Acceptance, E, NR by  at 2/24/2023 1425                   Point: Home exercise program (Done)     Description:   Instruct learner(s) on appropriate technique for monitoring, assisting and/or progressing therapeutic exercises/activities.               Learning Progress Summary           Patient Acceptance, E, VU by AN at 3/8/2023 1511    Acceptance, E, VU by MR at 3/7/2023 1315                   Point: Precautions (Done)     Description:   Instruct learner(s) on prescribed precautions during self-care and functional transfers.              Learning Progress Summary           Patient Acceptance, E, VU by AN at 3/8/2023 1511    Acceptance, E, VU by MR at 3/7/2023 1315    Acceptance, E, VU by AN at 3/1/2023 1455    Acceptance, E, VU by AN at 2/28/2023 1025    Acceptance, E, NR by CS at 2/24/2023 1425                   Point: Body mechanics (Done)     Description:   Instruct learner(s) on proper positioning and spine alignment during self-care, functional mobility activities and/or exercises.              Learning Progress Summary           Patient Acceptance, E, VU by AN at 3/8/2023 1511    Acceptance, E, VU by MR at 3/7/2023 1315    Acceptance, E, VU by AN at 3/1/2023 1455    Acceptance, E, VU by AN at 2/28/2023 1025    Acceptance, E, NR by  at 2/24/2023 1425                               User Key     Initials Effective Dates Name Provider Type Discipline     09/02/21 -  Maci Flores, OT Occupational Therapist OT    AN 09/21/21 -  Alejandra Jaimes, OT Occupational Therapist OT    MR 09/22/22 -  Sarah Collins OT Occupational Therapist OT              OT Recommendation and Plan     Plan of Care Review  Plan of Care Reviewed With: patient  Progress: no change  Outcome Evaluation: Pt politely declined EOB and OOB activity due to dizziness and headache. Pt complaining of L wrist pain, reviewed splint wearing schedule, and removed to assess skin; no redness note. Donned with good fit noted and no complaints of pain. Cont POC.     Time Calculation:    Time Calculation- OT     Row Name 03/08/23 1512             Time Calculation- OT    OT Start Time 1430  -AN      OT Received On 03/08/23  -AN         Timed Charges    53683 - OT Self Care/Mgmt Minutes 8  -AN          Total Minutes    Timed Charges Total Minutes 8  -AN       Total Minutes 8  -AN            User Key  (r) = Recorded By, (t) = Taken By, (c) = Cosigned By    Initials Name Provider Type    Alejandra Calderon OT Occupational Therapist              Therapy Charges for Today     Code Description Service Date Service Provider Modifiers Qty    [id number]  OT SELF CARE/MGMT/TRAIN EA 15 MIN 3/8/2023 Alejandra Jaimes OT GO 1               Alejandra Jaimes OT  3/8/2023

## 2023-03-08 NOTE — PLAN OF CARE
Goal Outcome Evaluation:  Plan of Care Reviewed With: patient        Progress: no change  Outcome Evaluation: Pt limited this session by c/o of dizziness. Upon initial stand from EOB pt with reported dizziness. Pt returned to sitting with dizziness passing, then reoccuring with next attempt to stand. BP elevated from 140/67 to 164/102. Pt returned to supine and RN alerted. Pt continues to present below baseline with functional mobility. Pt will benefit from further IPPT for addressing deficits. PT rec continue current POC.

## 2023-03-08 NOTE — PLAN OF CARE
Goal Outcome Evaluation:  Plan of Care Reviewed With: patient           Outcome Evaluation: Pt a/o, VSS, RA, Sat. 97%, c/o pain addressed with PRN  meds. Continue monitoring.

## 2023-03-08 NOTE — CASE MANAGEMENT/SOCIAL WORK
Continued Stay Note  [facility]     Patient Name: Dorothy Zavala  MRN: [id number]  Today's Date: 3/8/2023    Admit Date: 2/23/2023    Plan: [facility]   Discharge Plan     Row Name 03/08/23 1420       Plan    Plan [facility]    Patient/Family in Agreement with Plan yes    Plan Comments [facility] faxed updates to Jacqueline at [facility] for team review. [facility] continuing to follow.    Final Discharge Disposition Code 03 - skilled nursing facility (SNF)                            Expected Discharge Date and Time     Expected Discharge Date Expected Discharge Time    Mar 11, 2023             Cortney Coleman RN

## 2023-03-08 NOTE — THERAPY TREATMENT NOTE
Patient Name: Dorothy Zavala  : 1945    MRN: [id number]                              Today's Date: 3/8/2023       Admit Date: 2023    Visit Dx:     ICD-10-CM ICD-9-CM   1. Cognitive communication deficit  R41.841 799.52   2. Dysphagia, unspecified type  R13.10 787.20     Patient Active Problem List   Diagnosis   • Suspected acute ischemic stroke    • Hypertension   • Anxiety and depression   • Hypothyroidism   • Arthritis   • COPD   • B-cell lymphoma    • ? Subdural hematoma   • Frequent falls   • Left wrist fracture   • Illicit drug use (UDS + opiates, BZDs, and TCAs)   • Encephalopathy acute     Past Medical History:   Diagnosis Date   • Anxiety and depression 2023   • Arthritis 2023   • B-cell lymphoma  2023   • COPD 2023   • Hypertension 2023   • Hypothyroidism 2023     History reviewed. No pertinent surgical history.   General Information     Row Name 23 [facility]          Physical Therapy Time and Intention    Document Type therapy note (daily note)  -AB     Mode of Treatment physical therapy  -AB     Row Name 23 [facility]          General Information    Patient Profile Reviewed yes  -AB     Existing Precautions/Restrictions fall;left;non-weight bearing;other (see comments)  NWB LUE  -AB     Barriers to Rehab medically complex  -AB     Row Name 23 [facility]          Cognition    Orientation Status (Cognition) oriented x 4  -AB     Row Name 23 [facility]          Safety Issues, Functional Mobility    Safety Issues Affecting Function (Mobility) insight into deficits/self-awareness;awareness of need for assistance;safety precaution awareness;safety precautions follow-through/compliance  -AB     Impairments Affecting Function (Mobility) balance;endurance/activity tolerance;strength  -AB           User Key  (r) = Recorded By, (t) = Taken By, (c) = Cosigned By    Initials Name Provider Type    AB Danisha Mello PT Physical Therapist               Mobility     Row Name  03/08/23 1323          Bed Mobility    Bed Mobility supine-sit;sit-supine;scooting/bridging  -AB     Scooting/Bridging [place] (Bed Mobility) supervision;1 person assist  -AB     Supine-Sit [place] (Bed Mobility) supervision;1 person assist;verbal cues  -AB     Sit-Supine [place] (Bed Mobility) supervision;1 person assist  -AB     Assistive Device (Bed Mobility) head of bed elevated  -AB     Comment, (Bed Mobility) Cues provided for maintaining NWB of LUE.  -AB     Row Name 03/08/23 1323          Transfers    Comment, (Transfers) Cues for sequencing and hand placement. Upon initial stand from EOB pt with c/o of dizziness. Pt returned to sitting with dizziness passing then occuring again with next attempt to stand. BP elevated from 140/67 to 164/102. Pt returned to supine and RN alerted.  -AB     Row Name 03/08/23 1323          Sit-Stand Transfer    Sit-Stand [place] (Transfers) contact guard;1 person assist;verbal cues  -AB     Assistive Device (Sit-Stand Transfers) cane, straight  -AB     Comment, (Sit-Stand Transfer) STSx2  -AB     Row Name 03/08/23 1323          Gait/Stairs (Locomotion)    Comment, (Gait/Stairs) Deferred d/t c/o of dizziness and elevated BP.  -AB     Row Name 03/08/23 1323          Mobility    Extremity Weight-bearing Status left upper extremity   -AB     Left Upper Extremity (Weight-bearing Status) non weight-bearing (NWB)   -AB           User Key  (r) = Recorded By, (t) = Taken By, (c) = Cosigned By    Initials Name Provider Type    AB Danisha Mello PT Physical Therapist               Obj/Interventions     Row Name 03/08/23 1326          Balance    Balance Assessment sitting static balance;sitting dynamic balance;standing static balance;standing dynamic balance  -AB     Static Sitting Balance independent  -AB     Dynamic Sitting Balance independent  -AB     Position, Sitting Balance unsupported;sitting edge of bed  -AB     Static Standing Balance contact guard;1-person  assist  -AB     Dynamic Standing Balance contact guard;1-person assist;verbal cues  -AB     Position/Device Used, Standing Balance supported;cane, straight  -AB     Balance Interventions sitting;standing;sit to stand;supported;static;dynamic  -AB     Comment, Balance No LOB but c/o of dizziness limiting mobility.  -AB           User Key  (r) = Recorded By, (t) = Taken By, (c) = Cosigned By    Initials Name Provider Type    AB Danisha Mello, PT Physical Therapist               Goals/Plan    No documentation.                Clinical Impression     Row Name 03/08/23 1327          Pain    Pretreatment Pain Rating 0/10 - no pain  -AB     Posttreatment Pain Rating 0/10 - no pain  -AB     Additional Documentation Pain Scale: Numbers Pre/Post-Treatment (Group)  -AB     Row Name 03/08/23 1325          Plan of Care Review    Plan of Care Reviewed With patient  -AB     Progress no change  -AB     Outcome Evaluation Pt limited this session by c/o of dizziness. Upon initial stand from EOB pt with reported dizziness. Pt returned to sitting with dizziness passing, then reoccuring with next attempt to stand. BP elevated from 140/67 to 164/102. Pt returned to supine and RN alerted. Pt continues to present below baseline with functional mobility. Pt will benefit from further IPPT for addressing deficits. PT rec continue current POC.  -AB     Row Name 03/08/23 1320          Vital Signs    Pre Systolic BP Rehab 140  -AB     Pre Treatment Diastolic BP 67  -AB     Post Systolic BP Rehab 164  -AB     Post Treatment Diastolic   -AB     O2 Delivery Pre Treatment room air  -AB     O2 Delivery Intra Treatment room air  -AB     O2 Delivery Post Treatment room air  -AB     Pre Patient Position Supine  -AB     Intra Patient Position Standing  -AB     Post Patient Position Supine  -AB     Row Name 03/08/23 1321          Positioning and Restraints    Pre-Treatment Position in bed  -AB     Post Treatment Position bed  -AB     In Bed  notified nsg;supine;call light within reach;encouraged to call for assist;exit alarm on;side rails up x1  -AB           User Key  (r) = Recorded By, (t) = Taken By, (c) = Cosigned By    Initials Name Provider Type    Danisha Bradshaw, PT Physical Therapist               Outcome Measures     Row Name 03/08/23 1331          How much help from another person do you currently need...    Turning from your back to your side while in flat bed without using bedrails? 4  -AB     Moving from lying on back to sitting on the side of a flat bed without bedrails? 4  -AB     Moving to and from a bed to a chair (including a wheelchair)? 3  -AB     Standing up from a chair using your arms (e.g., wheelchair, bedside chair)? 4  -AB     Climbing 3-5 steps with a railing? 3  -AB     To walk in hospital room? 3  -AB     AM-PAC 6 Clicks Score (PT) 21  -AB     Highest level of mobility 6 --> Walked 10 steps or more  -AB     Row Name 03/08/23 1331          Functional Assessment    Outcome Measure Options AM-PAC 6 Clicks Basic Mobility (PT)  -AB           User Key  (r) = Recorded By, (t) = Taken By, (c) = Cosigned By    Initials Name Provider Type    Danisha Bradshaw, PT Physical Therapist                             Physical Therapy Education     Title: PT OT SLP Therapies (Done)     Topic: Physical Therapy (Done)     Point: Mobility training (Done)     Learning Progress Summary           Patient Acceptance, E,D, VU,DU by AB at 3/8/2023 1331    Acceptance, E, VU,NR by LO at 3/7/2023 1319    Comment: PT POC    Acceptance, E,TB, VU by KW at 3/6/2023 1418    Comment: cotninued benefit of skilled PT services    Acceptance, E, VU,NR by KG at 3/3/2023 0846    Acceptance, E, VU by SJ at 3/1/2023 1623    Acceptance, E, NR by AS at 2/27/2023 0958    Acceptance, E, VU by CM at 2/24/2023 1449   Family Acceptance, E, VU by CM at 2/24/2023 1449                   Point: Home exercise program (Done)     Learning Progress Summary           Patient  Acceptance, E, VU,NR by LO at 3/7/2023 1319    Comment: PT POC    Acceptance, E,TB, VU by KW at 3/6/2023 1418    Comment: cotninued benefit of skilled PT services    Acceptance, E, VU,NR by KG at 3/3/2023 0846    Acceptance, E, VU by SJ at 3/1/2023 1623    Acceptance, E, NR by AS at 2/27/2023 0958                   Point: Body mechanics (Done)     Learning Progress Summary           Patient Acceptance, E,D, VU,DU by AB at 3/8/2023 1331    Acceptance, E, VU,NR by LO at 3/7/2023 1319    Comment: PT POC    Acceptance, E,TB, VU by KW at 3/6/2023 1418    Comment: cotninued benefit of skilled PT services    Acceptance, E, VU,NR by KG at 3/3/2023 0846    Acceptance, E, VU by SJ at 3/1/2023 1623    Acceptance, E, NR by AS at 2/27/2023 0958                   Point: Precautions (Done)     Learning Progress Summary           Patient Acceptance, E,D, VU,DU by AB at 3/8/2023 1331    Acceptance, E, VU,NR by LO at 3/7/2023 1319    Comment: PT POC    Acceptance, E,TB, VU by KW at 3/6/2023 1418    Comment: cotninued benefit of skilled PT services    Acceptance, E, VU,NR by KG at 3/3/2023 0846    Acceptance, E, VU by SJ at 3/1/2023 1623    Acceptance, E, NR by AS at 2/27/2023 0958    Acceptance, E, VU by CM at 2/24/2023 1449   Family Acceptance, E, VU by CM at 2/24/2023 1449                               User Key     Initials Effective Dates Name Provider Type Discipline    SJ 02/03/23 -  Alyssia Saini, PT Physical Therapist PT    AS 02/03/23 -  Danisha Rai, PTA Physical Therapist Assistant PT    KG 05/22/20 -  Shanelle Roach, PT Physical Therapist PT    LO 06/16/21 -  Lynn Ferraro, PT Physical Therapist PT    KW 01/27/22 -  Hailey Zimmerman, PT Physical Therapist PT    AB 09/22/22 -  Danisha Mello, PT Physical Therapist PT    CM 09/22/22 -  Kiana Davis, JULIUS Physical Therapist PT              PT Recommendation and Plan     Plan of Care Reviewed With: patient  Progress: no change  Outcome Evaluation: Pt  limited this session by c/o of dizziness. Upon initial stand from EOB pt with reported dizziness. Pt returned to sitting with dizziness passing, then reoccuring with next attempt to stand. BP elevated from 140/67 to 164/102. Pt returned to supine and RN alerted. Pt continues to present below baseline with functional mobility. Pt will benefit from further IPPT for addressing deficits. PT rec continue current POC.     Time Calculation:    PT Charges     Row Name 03/08/23 1334             Time Calculation    Start Time 0820  -AB      PT Received On 03/08/23  -AB      PT Goal Re-Cert Due Date 03/16/23  -AB         Timed Charges    97768 - PT Therapeutic Activity Minutes 15  -AB         Total Minutes    Timed Charges Total Minutes 15  -AB       Total Minutes 15  -AB            User Key  (r) = Recorded By, (t) = Taken By, (c) = Cosigned By    Initials Name Provider Type    Danisha Bradshaw, PT Physical Therapist              Therapy Charges for Today     Code Description Service Date Service Provider Modifiers Qty    [id number]  PT THERAPEUTIC ACT EA 15 MIN 3/8/2023 Danisha Mello, PT GP 1          PT G-Codes  Outcome Measure Options: AM-PAC 6 Clicks Basic Mobility (PT)  AM-PAC 6 Clicks Score (PT): 21  AM-PAC 6 Clicks Score (OT): 18  Modified Didier Scale: 4 - Moderately severe disability.  Unable to walk without assistance, and unable to attend to own bodily needs without assistance.  PT Discharge Summary  Anticipated Discharge Disposition (PT): skilled nursing facility    Danisha Mello PT  3/8/2023

## 2023-03-08 NOTE — THERAPY TREATMENT NOTE
Acute Care - Speech Language Pathology   Treatment Note  [facility]     Patient Name: Dorothy Zavala  : 1945  MRN: [id number]  Today's Date: 3/8/2023               Admit Date: 2023     Visit Dx:    ICD-10-CM ICD-9-CM   1. Cognitive communication deficit  R41.841 799.52   2. Dysphagia, unspecified type  R13.10 787.20     Patient Active Problem List   Diagnosis   • Suspected acute ischemic stroke    • Hypertension   • Anxiety and depression   • Hypothyroidism   • Arthritis   • COPD   • B-cell lymphoma    • ? Subdural hematoma   • Frequent falls   • Left wrist fracture   • Illicit drug use (UDS + opiates, BZDs, and TCAs)   • Encephalopathy acute     Past Medical History:   Diagnosis Date   • Anxiety and depression 2023   • Arthritis 2023   • B-cell lymphoma  2023   • COPD 2023   • Hypertension 2023   • Hypothyroidism 2023     History reviewed. No pertinent surgical history.    SLP Recommendation and Plan  SLP Diagnosis: mild, dysarthria, mild-moderate, aphasia, cognitive-linguistic disorder (23)           [facility] [place] Interventions Met: yes (23)  Anticipated Discharge Disposition (SLP): unknown, anticipate therapy at next level of care (23)        Predicted Duration Therapy Intervention (Days): until discharge (23)     Daily Summary of Progress (SLP): progress toward functional goals as expected (23)           Treatment Assessment (SLP): improved, continued, cognitive-linguistic disorder (23)  Treatment Assessment Comments (SLP): Patient with difficulty staying on topic and with recall during session. (23)  Plan for Continued Treatment (SLP): continue treatment per plan of care (23)         SLP EVALUATION (last 72 hours)     SL[place] [facility] [place]     Row Name 23 1450                Communication Assessment/Intervention    Document Type therapy note (daily  note)  - therapy note (daily note)  -MP (r) LL (t) MP (c)       Subjective Information no complaints  - no complaints  -MP (r) LL (t) MP (c)       Patient Observations alert;cooperative;agree to therapy  - alert;cooperative  -MP (r) LL (t) MP (c)       Patient/Family/Caregiver Comments/Observations none present  - No family present  -MP (r) LL (t) MP (c)       Patient Effort good  - good  -MP (r) LL (t) MP (c)       Symptoms Noted During/After Treatment none  - --          General Information    Patient Profile Reviewed yes  - --          Pain    Additional Documentation Pain Scale: FACES Pre/Post-Treatment (Group)  -CH Pain Scale: FACES Pre/Post-Treatment (Group)  -MP (r) LL (t) MP (c)          Pain Scale: FACES Pre/Post-Treatment    Pain: FACES Scale, Pretreatment 0-->no hurt  - 0-->no hurt  -MP (r) LL (t) MP (c)       Posttreatment Pain Rating 2-->hurts little bit  - 0-->no hurt  -MP (r) LL (t) MP (c)       Pain Location - head  -CH --       Pre/Posttreatment Pain Comment tolerated  - --          SLP Evaluation Clinical Impressions    SLP Diagnosis mild;dysarthria;mild-moderate;aphasia;cognitive-linguistic disorder  - --       Rehab Potential/Prognosis good  - --       [facility] [place] Met yes  - --       Functional Impact functional impact in social situations;functional impact in ADLs  - --          SLP Treatment Clinical Impressions    Treatment Assessment (SLP) improved;continued;cognitive-linguistic disorder  - continued;cognitive-linguistic disorder  -MP (r) LL (t) MP (c)       Treatment Assessment Comments (SLP) Patient with difficulty staying on topic and with recall during session.  - Improvement in cog-comm. Targeted multiple defintions, and proverbs. Pt continued w/ deficits of staying on topic during conversation.  -MP (r) LL (t) MP (c)       Daily Summary of Progress (SLP) progress toward functional goals as expected  - progress toward  functional goals is good  -MP (r) LL (t) MP (c)       Plan for Continued Treatment (SLP) continue treatment per plan of care  - continue treatment per plan of care  -MP (r) LL (t) MP (c)       Care Plan Review care plan/treatment goals reviewed  - care plan/treatment goals reviewed  -MP (r) LL (t) MP (c)          Recommendations    Therapy Frequency (SLP SLC) 5 days per week  - 5 days per week  -MP (r) LL (t) MP (c)       Predicted Duration Therapy Intervention (Days) until discharge  - until discharge  -MP (r) LL (t) MP (c)       Anticipated Discharge Disposition (SLP) unknown;anticipate therapy at next level of care  -CH unknown;anticipate therapy at next level of care  -MP (r) LL (t) MP (c)             User Key  (r) = Recorded By, (t) = Taken By, (c) = Cosigned By    Initials Name Effective Dates    MP Alexander Olmstead, MS CCC-SLP 12/28/21 -     Arabella Delcid, MS CCC-SLP 06/16/21 -     Sonal Foreman, Speech Therapy Student 01/20/23 -                    EDUCATION  The patient has been educated in the following areas:     Cognitive Impairment Communication Impairment.           SLP GOALS     Row Name 03/08/23 1415 03/06/23 1450          Patient will demonstrate functional speech skills for return to discharge environment    [place] with minimal cues  -CH --     Time frame by discharge  - --     Progress/Outcomes goal met  - --        Patient will demonstrate functional language skills for return to discharge environment     [place] with minimal cues  -CH with minimal cues  -MP (r) LL (t) MP (c)     Time frame by discharge  - by discharge  -MP (r) LL (t) MP (c)     Progress/Outcomes continuing progress toward goal  -CH continuing progress toward goal  -MP (r) LL (t) MP (c)        Patient will demonstrate functional cognitive-linguistic skills for return to discharge environment    [place] with minimal cues  -CH with minimal cues  -MP (r) LL (t) MP (c)     Time frame by  discharge  -CH by discharge  -MP (r) LL (t) MP (c)     Progress/Outcomes continuing progress toward goal  -CH continuing progress toward goal  -MP (r) LL (t) MP (c)        Connected Speech to Express Thoughts Goal 1 (SLP)    Improve Narrative Discourse to Express Thoughts By Goal 1 (SLP) giving multiple definitions of a word;describing a picture;explaining a proverb or idiom;conversational task on a given topic;80%;with minimal cues (75-90%)  -CH giving multiple definitions of a word;describing a picture;explaining a proverb or idiom;conversational task on a given topic;80%;with minimal cues (75-90%)  -MP (r) LL (t) MP (c)     Time Frame (Connected Speech Goal 1, SLP) short term goal (STG)  -CH short term goal (STG)  -MP (r) LL (t) MP (c)     Progress (Connected Speech Goal 1, SLP) 30%;50%;70%  -CH 20%;40%;50%;with minimal cues (75-90%)  -MP (r) LL (t) MP (c)     Progress/Outcomes (Connected Speech Goal 1, SLP) continuing progress toward goal  -CH continuing progress toward goal  -MP (r) LL (t) MP (c)     Comment (Connected Speech Goal 1, SLP) -- 2/5 multiple defintions, 1/5 proverbs, difficulty staying on topic during conversation half of time, needed cues to redirect to conversation topic.  -MP (r) LL (t) MP (c)        Articulation Goal 1 (SLP)    Improve Articulation Goal 1 (SLP) by over-articulating at phrase level;by over-articulating in connected speech;80%;with minimal cues (75-90%)  - --     Time Frame (Articulation Goal 1, SLP) short term goal (STG)  -CH --     Progress (Articulation Goal 1, SLP) 100%;independently (over 90% accuracy)  -CH --     Progress/Outcomes (Articulation Goal 1, SLP) goal met  -CH --     Comment (Articulation Goal 1, SLP) 100% intelligbility to unfamiliar listener.  -CH --        Awareness of Basic Personal Information Goal 1 (SLP)    Improve Awareness of Basic Personal Information Goal 1 (SLP) naming self, family members;answering open-ended questions regarding  personal/biographical information;80%;with minimal cues (75-90%)  -CH naming self, family members;answering open-ended questions regarding personal/biographical information;80%;with minimal cues (75-90%)  -MP (r) LL (t) MP (c)     Time Frame (Awareness of Basic Personal Information Goal 1, SLP) short term goal (STG)  -CH short term goal (STG)  -MP (r) LL (t) MP (c)     Progress (Awareness of Basic Personal Information Goal 1, SLP) 80%;with minimal cues (75-90%)  -CH --     Progress/Outcomes (Awareness of Basic Personal Information Goal 1, SLP) continuing progress toward goal  -CH goal ongoing  -MP (r) LL (t) MP (c)        Orientation Goal 1 (SLP)    Improve Orientation Through Goal 1 (SLP) demonstrating orientation to place;demonstrating orientation to disease/impairment;80%;with minimal cues (75-90%)  -CH --     Time Frame (Orientation Goal 1, SLP) short term goal (STG)  -CH --     Progress (Orientation Goal 1, SLP) 100%;independently (over 90% accuracy)  -CH --     Progress/Outcomes (Orientation Goal 1, SLP) goal met  -CH --           User Key  (r) = Recorded By, (t) = Taken By, (c) = Cosigned By    Initials Name Provider Type    Alexander Herrera, MS CCC-SLP Speech and Language Pathologist    Arabella Delcid, MS CCC-SLP Speech and Language Pathologist     Sonal Franz, Speech Therapy Student SLP Student                        Time Calculation:      Time Calculation- SLP     Row Name 03/08/23 1525             Time Calculation- SLP    SLP Start Time 1415  -CH      SLP Received On 03/08/23  -         Untimed Charges    42588-GV Treatment/ST Modification Prosth Aug Alter  45  -CH         Total Minutes    Untimed Charges Total Minutes 45  -CH       Total Minutes 45  -CH            User Key  (r) = Recorded By, (t) = Taken By, (c) = Cosigned By    Initials Name Provider Type    Arabella Delcid, MS CCC-SLP Speech and Language Pathologist                Therapy Charges for Today     Code  Description Service Date Service Provider Modifiers Qty    39693747268  ST TREATMENT SPEECH 3 3/8/2023 Arabella Joaquin, MS CCC-SLP GN 1                     Arabella Joaquin MS CCC-SLP  3/8/2023

## 2023-03-08 NOTE — THERAPY TREATMENT NOTE
Patient Name: Dorothy Zavala  : 1945    MRN: [id number]                              Today's Date: 3/8/2023       Admit Date: 2023    Visit Dx:     ICD-10-CM ICD-9-CM   1. Cognitive communication deficit  R41.841 799.52   2. Dysphagia, unspecified type  R13.10 787.20     Patient Active Problem List   Diagnosis   • Suspected acute ischemic stroke    • Hypertension   • Anxiety and depression   • Hypothyroidism   • Arthritis   • COPD   • B-cell lymphoma    • ? Subdural hematoma   • Frequent falls   • Left wrist fracture   • Illicit drug use (UDS + opiates, BZDs, and TCAs)   • Encephalopathy acute     Past Medical History:   Diagnosis Date   • Anxiety and depression 2023   • Arthritis 2023   • B-cell lymphoma  2023   • COPD 2023   • Hypertension 2023   • Hypothyroidism 2023     History reviewed. No pertinent surgical history.   General Information    No documentation.    yes      Precautions: fall; left; non-weight bearing; other (see comments)  Medically complex  Oriented x 4   Safety precaution awareness; safety precautions follow-through/compliance  balance; endurance/activity tolerance; strength          Mobility/ADL's    Left upper extremity   Non weight bearing (NWB)   Supine - sit   -- supervision (impaired trunk control for bed mobilit; decreased use of arms for pushing/pulling; HOB elevated. Educated on NWB for LUE     Sit to stand tranfers completed w/ SBA and v/c for safety.   Pt utilized a SPC for mobility completing > HH distances.       CGA for donning gown sitting at EOB   SUA for washing face             Obj/Interventions    No documentation.                Goals/Plan    No documentation.                Clinical Impression    No documentation.                Outcome Measures     Row Name 23 1331          How much help from another person do you currently need...    Turning from your back to your side while in flat bed without using bedrails? 4  -AB     Moving  from lying on back to sitting on the side of a flat bed without bedrails? 4  -AB     Moving to and from a bed to a chair (including a wheelchair)? 3  -AB     Standing up from a chair using your arms (e.g., wheelchair, bedside chair)? 4  -AB     Climbing 3-5 steps with a railing? 3  -AB     To walk in hospital room? 3  -AB     AM-PAC 6 Clicks Score (PT) 21  -AB     Highest level of mobility 6 --> Walked 10 steps or more  -AB     Row Name 03/08/23 1331          Functional Assessment    Outcome Measure Options AM-PAC 6 Clicks Basic Mobility (PT)  -AB           User Key  (r) = Recorded By, (t) = Taken By, (c) = Cosigned By    Initials Name Provider Type    AB Danisha Mello PT Physical Therapist                Occupational Therapy Education     Title: PT OT SLP Therapies (Done)     Topic: Occupational Therapy (Done)     Point: ADL training (Done)     Description:   Instruct learner(s) on proper safety adaptation and remediation techniques during self care or transfers.   Instruct in proper use of assistive devices.              Learning Progress Summary           Patient Acceptance, E, VU by MR at 3/7/2023 1315    Acceptance, E, VU by AN at 3/1/2023 1455    Acceptance, E, VU by AN at 2/28/2023 1025    Acceptance, E, NR by CS at 2/24/2023 1425                   Point: Home exercise program (Done)     Description:   Instruct learner(s) on appropriate technique for monitoring, assisting and/or progressing therapeutic exercises/activities.              Learning Progress Summary           Patient Acceptance, E, VU by MR at 3/7/2023 1315                   Point: Precautions (Done)     Description:   Instruct learner(s) on prescribed precautions during self-care and functional transfers.              Learning Progress Summary           Patient Acceptance, E, VU by MR at 3/7/2023 1315    Acceptance, E, VU by AN at 3/1/2023 1455    Acceptance, E, VU by AN at 2/28/2023 1025    Acceptance, E, NR by CS at 2/24/2023 1427                    Point: Body mechanics (Done)     Description:   Instruct learner(s) on proper positioning and spine alignment during self-care, functional mobility activities and/or exercises.              Learning Progress Summary           Patient Acceptance, E, VU by MR at 3/7/2023 1315    Acceptance, E, VU by AN at 3/1/2023 1455    Acceptance, E, VU by AN at 2/28/2023 1025    Acceptance, E, NR by  at 2/24/2023 1425                               User Key     Initials Effective Dates Name Provider Type Discipline     09/02/21 -  Maci Flores, OT Occupational Therapist OT    AN 09/21/21 -  Alejandra Jaimes, OT Occupational Therapist OT    MR 09/22/22 -  Sarah Collins, OT Occupational Therapist OT              OT Recommendation and Plan  Planned Therapy Interventions (OT): activity tolerance training, adaptive equipment training, BADL retraining, functional balance retraining, patient/caregiver education/training, transfer/mobility retraining, ROM/therapeutic exercise, strengthening exercise, occupation/activity based interventions  Plan of Care Review  Plan of Care Reviewed With: patient  Progress: improving  Outcome Evaluation: Pt demonstrating improvements w/ bed mobility - SUP w/ v/c to adhere to NWB on the LUE. SUP for STS from EOB to upright. Pt completed HH distances of functional mobility w/ SPC. Pt educated on AROM of fingers on LUE for ROM and edema management. Continue to progress as able per current POC.     Time Calculation:     Therapy Charges for Today     Code Description Service Date Service Provider Modifiers Qty    71693370278  OT THERAPEUTIC ACT EA 15 MIN 3/7/2023 Sarah Collins OT GO 1    [id number]  OT SELF CARE/MGMT/TRAIN EA 15 MIN 3/7/2023 Sarah Collins OT GO 1    [id number]  OT THER SUPP EA 15 MIN 3/7/2023 Sarah Collins OT GO 2               Sarah Collins OT  3/8/2023

## 2023-03-08 NOTE — PLAN OF CARE
Goal Outcome Evaluation:  Plan of Care Reviewed With: patient        Progress: no change  Outcome Evaluation: Pt politely declined EOB and OOB activity due to dizziness and headache. Pt complaining of L wrist pain, reviewed splint wearing schedule, and removed to assess skin; no redness note. Donned with good fit noted and no complaints of pain. Cont POC.

## 2023-03-08 NOTE — PLAN OF CARE
Goal Outcome Evaluation:    SLP treatment completed. Will continue to address cognitive communication. Please see note for further details and recommendations.

## 2023-03-08 NOTE — PLAN OF CARE
Goal Outcome Evaluation:  Plan of Care Reviewed With: patient           Outcome Evaluation: PT SITTING UP IN BED ALERT AND ORIENTED X 4.  PT IS ON ROOM AIR AND NO TELEMETRY.  PT ALSO HAS NO IV AT THIS TIME AND THE DOCTOR IS AWARE.  PT HAS A PUREWICK TO SUCTION IN PLACE.  PT C/O PAIN ADMIN MEDICATION PER MD PRN ORDERS.  NO DISTRESS NOTED AT THIS TIME.  WILL CONT TO MONITOR FOR CHANGES.  PT IS WAITING ON PLACEMENT FOR REHAB.

## 2023-03-09 PROCEDURE — 97116 GAIT TRAINING THERAPY: CPT

## 2023-03-09 PROCEDURE — 97110 THERAPEUTIC EXERCISES: CPT

## 2023-03-09 PROCEDURE — 99231 SBSQ HOSP IP/OBS SF/LOW 25: CPT | Performed by: FAMILY MEDICINE

## 2023-03-09 RX ADMIN — OXYCODONE HYDROCHLORIDE AND ACETAMINOPHEN 1 TABLET: 5; 325 TABLET ORAL at 09:46

## 2023-03-09 RX ADMIN — PANTOPRAZOLE SODIUM 40 MG: 40 TABLET, DELAYED RELEASE ORAL at 05:57

## 2023-03-09 RX ADMIN — AMLODIPINE BESYLATE 5 MG: 5 TABLET ORAL at 08:24

## 2023-03-09 RX ADMIN — HYDROXYZINE HYDROCHLORIDE 25 MG: 25 TABLET ORAL at 20:32

## 2023-03-09 RX ADMIN — ATORVASTATIN CALCIUM 80 MG: 40 TABLET, FILM COATED ORAL at 20:32

## 2023-03-09 RX ADMIN — ASPIRIN 81 MG CHEWABLE TABLET 81 MG: 81 TABLET CHEWABLE at 08:24

## 2023-03-09 RX ADMIN — SERTRALINE HYDROCHLORIDE 50 MG: 50 TABLET ORAL at 08:24

## 2023-03-09 RX ADMIN — LEVOTHYROXINE SODIUM 100 MCG: 0.1 TABLET ORAL at 05:58

## 2023-03-09 RX ADMIN — OXYCODONE HYDROCHLORIDE AND ACETAMINOPHEN 1 TABLET: 5; 325 TABLET ORAL at 20:32

## 2023-03-09 RX ADMIN — OXYCODONE HYDROCHLORIDE AND ACETAMINOPHEN 1 TABLET: 5; 325 TABLET ORAL at 14:25

## 2023-03-09 NOTE — PROGRESS NOTES
[facility] Medicine Services  PROGRESS NOTE    Patient Name: Dorothy Zavala  : 1945  MRN: [id number]    Date of Admission: 2023  Primary Care Physician: Provider, No Known    Subjective   Subjective     CC: f/u weakness    HPI: Patient reports that she drank a boost this morning and had later had diarrhea. She continues to feel weak. No pain this morning     ROS:  Gen- No fevers, chills  CV- No chest pain, palpitations  Resp- No cough, dyspnea  GI- No N/V/D, abd pain        Objective   Objective     Vital Signs:   Temp:  [98.2 °F (36.8 °C)-98.8 °F (37.1 °C)] 98.7 °F (37.1 °C)  Heart Rate:  [76-91] 91  Resp:  [16-18] 17  BP: (143-171)/(69-89) 149/89     Physical Exam:  Constitutional: No acute distress, awake, alert  HENT: NCAT, mucous membranes moist  Respiratory: Clear to auscultation bilaterally, respiratory effort normal   Cardiovascular: RRR, no murmurs, rubs, or gallops  Gastrointestinal: nondistended  Musculoskeletal: No bilateral ankle edema, left forearm in splint   Psychiatric: Appropriate affect, cooperative  Neurologic: Oriented x 3, upper extremity weakness worse than lower extremity,  no focal deficits noted, speech clear  Skin: No rashes      Results Reviewed:  LAB RESULTS:      Lab 23  0724   WBC 11.16*   HEMOGLOBIN 13.4   HEMATOCRIT 42.2   PLATELETS 298   MCV 91.7         Lab 23  0724   SODIUM 138   POTASSIUM 4.5   CHLORIDE 101   CO2 24.0   ANION GAP 13.0   BUN 15   CREATININE 0.64   EGFR 92.3   GLUCOSE 98   CALCIUM 9.4         Lab 23  0724   TOTAL PROTEIN 6.6   ALBUMIN 3.9   GLOBULIN 2.7   ALT (SGPT) 18   AST (SGOT) 20   BILIRUBIN 0.5   ALK PHOS 83                     Brief Urine Lab Results  (Last result in the past 365 days)      Color   Clarity   Blood   Leuk Est   Nitrite   Protein   CREAT   Urine HCG        23 1449 Yellow   Cloudy   Negative   Negative   Negative   Trace                 Microbiology Results Abnormal     Procedure  Component Value - Date/Time    Fungus Culture - Cerebrospinal Fluid, Lumbar Puncture [[id number]]  (Normal) Collected: 02/27/23 1246    Lab Status: Preliminary result Specimen: Cerebrospinal Fluid from Lumbar Puncture Updated: 03/06/23 1345     Fungus Culture No fungus isolated at 1 week    Culture, CSF - Cerebrospinal Fluid, Lumbar Puncture [[id number]] Collected: 02/27/23 1246    Lab Status: Final result Specimen: Cerebrospinal Fluid from Lumbar Puncture Updated: 03/02/23 1008     CSF Culture No growth at 3 days     Gram Stain Rare (1+) WBCs seen      No organisms seen    Blood Culture - Blood, Hand, Right [[id number]]  (Normal) Collected: 02/23/23 2122    Lab Status: Final result Specimen: Blood from Hand, Right Updated: 02/28/23 2146     Blood Culture No growth at 5 days    Narrative:      Aerobic bottle only      Blood Culture - Blood, Hand, Left [[id number]]  (Normal) Collected: 02/23/23 2122    Lab Status: Final result Specimen: Blood from Hand, Left Updated: 02/28/23 2146     Blood Culture No growth at 5 days    Narrative:      Aerobic bottle only      Meningitis / Encephalitis Panel, PCR - Cerebrospinal Fluid, Lumbar Puncture [[id number]]  (Normal) Collected: 02/27/23 1246    Lab Status: Final result Specimen: Cerebrospinal Fluid from Lumbar Puncture Updated: 02/27/23 1458     ESCHERICHIA COLI K1, PCR Not Detected     HAEMOPHILUS INFLUENZAE, PCR Not Detected     LISTERIA MONOCYTOGENES, PCR Not Detected     NEISSERIA MENINGITIDIS, PCR Not Detected     STREPTOCOCCUS AGALACTIAE, PCR Not Detected     STREPTOCOCCUS PNEUMONIAE, PCR Not Detected     CYTOMEGALOVIRUS (CMV), PCR Not Detected     ENTEROVIRUS, PCR Not Detected     HERPES SIMPLEX VIRUS 1 (HSV-1), PCR Not Detected     HERPES SIMPLEX VIRUS 2 (HSV-2), PCR Not Detected     HUMAN PARECHOVIRUS, PCR Not Detected     VARICELLA ZOSTER VIRUS (VZV), PCR Not Detected     CRYPTOCOCCUS NEOFORMANS / GATTII, PCR Not Detected     HUMAN HERPES VIRUS 6 PCR Not Detected     Cryptococcal AG, CSF - Cerebrospinal Fluid, Lumbar Puncture [[id number]]  (Normal) Collected: 02/27/23 1246    Lab Status: Final result Specimen: Cerebrospinal Fluid from Lumbar Puncture Updated: 02/27/23 1435     Cryptococcal Antigen, CSF Negative          No radiology results from the last 24 hrs    Results for orders placed during the hospital encounter of 02/23/23    Adult Transthoracic Echo Complete W/ Cont if Necessary Per Protocol (With Agitated Saline)    Interpretation Summary  •  Left ventricular systolic function is normal. Calculated left ventricular EF = 67.2%  •  Left ventricular diastolic function was normal.  •  Saline test results are negative for right to left atrial level shunt.  •  Estimated right ventricular systolic pressure from tricuspid regurgitation is normal (<35 mmHg).      Current medications:  Scheduled Meds:amLODIPine, 5 mg, Oral, Q24H  aspirin, 81 mg, Oral, Daily   Or  aspirin, 300 mg, Rectal, Daily  atorvastatin, 80 mg, Oral, Nightly  levothyroxine, 100 mcg, Oral, Q AM  melatonin, 10 mg, Oral, Nightly  pantoprazole, 40 mg, Oral, Q AM  sertraline, 50 mg, Oral, Daily  sodium chloride, 10 mL, Intravenous, Q12H      Continuous Infusions:   PRN Meds:.•  hydrOXYzine  •  Magnesium Standard Dose Replacement - Initiate Nurse / BPA Driven Protocol  •  oxyCODONE-acetaminophen  •  Phosphorus Replacement - Initiate Nurse / BPA Driven Protocol  •  Potassium Replacement - Initiate Nurse / BPA Driven Protocol  •  sodium chloride  •  sodium chloride    Assessment & Plan   Assessment & Plan     Active Hospital Problems    Diagnosis  POA   • **Encephalopathy acute [G93.40]  Yes   • Suspected acute ischemic stroke  [I63.9]  Yes   • Hypertension [I10]  Yes   • Anxiety and depression [F41.9, F32.A]  Yes   • Hypothyroidism [E03.9]  Yes   • Arthritis [M19.90]  Yes   • COPD [J44.9]  Yes   • B-cell lymphoma  [C85.90]  Yes   • ? Subdural hematoma [S06.5XAA]  Yes   • Frequent falls [R29.6]  Not Applicable   •  "Left wrist fracture [S62.102A]  Yes   • Illicit drug use (UDS + opiates, BZDs, and TCAs) [F19.90]  Yes      Resolved Hospital Problems   No resolved problems to display.        Brief Hospital Course to date:  Dorothy Zavala is a 77 y.o. 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 Dukes in [facility]), current falls with numerous fractures, who presents to Psychiatric on 2/23/2023 was transferred from [facility] for evaluation of stroke.Stroke imaging was negative. Per report, history from son (who is apparently estranged from his mother) suggests that she has had declining neurologic status. Patient started on antibiotics initially for empiric coverage of meningitis per neurology.  They no longer feels her symptoms are concerning for meningitis and their note states that it is okay to stop antibiotics, off antibiotics and observing. Encephalopathy is improved but neurology concerned for chronic meningitis. LP in IR completed which was negative. She continues to improve with ongoing PT/OT.    This patient's problems and plans were partially entered by my partner and updated as appropriate by me 03/09/23.     Encephalopathy  - Improved. Possible alcohol/medication withdrawal. She is back to baseline.   - Neurology concerned was initially concerned for chronic meningitis. LP per IR on 2/27/23 stable- culture negative and meningitis/encephalitis panel negative. Improving off abx.  - PT/OT/SLP, referrals pending.     Depression  - Patient complaining of depression but states she no longer wants to take her Trazodone. My partner had a Long d/w her son who thinks depression is playing a large role in her decline particularly due to recent passing of her brother and recent loss of her house. He asked that we be \"tough on her\" otherwise she will not do " much for herself.  - Has been started on Zoloft 50mg PO daily which seems to have helped some. Increase as tolerated as outpatient.     HTN  - Better when she was taking amlodipine   -she had been  refusing antihypertensives.      CVA/TIA ruled out.  - Continue asa/statin     Hypothyroidism  - Continue synthroid     Lymphoma  - Needs oncology follow up     Wrist fracture, L  - Seen by orthopedic surgery, Dr. Ramos-  Recommends patient return in 1 to 2 weeks to clinic for repeat radiographs.  Okay for weightbearing through elbow left upper extremity, otherwise nonweightbearing left upper extremity         Expected Discharge Location and Transportation: [facility]  Expected Discharge   Expected Discharge Date and Time     Expected Discharge Date Expected Discharge Time    Mar 11, 2023            DVT prophylaxis:  Mechanical DVT prophylaxis orders are present.     AM-PAC 6 Clicks Score (PT): 21 (03/08/23 1331)    CODE STATUS:   Code Status and Medical Interventions:   Ordered at: 02/23/23 0530     Code Status (Patient has no pulse and is not breathing):    CPR (Attempt to Resuscitate)     Medical Interventions (Patient has pulse or is breathing):    Full Support       Ivania Verde,   03/09/23

## 2023-03-09 NOTE — PLAN OF CARE
Goal Outcome Evaluation:  Plan of Care Reviewed With: patient           Outcome Evaluation: PT RESTING IN BED ON ROOM AIR.  PT C/O PAIN ADMIN MEDICATION PER MD PRN ORDERS.  VSS HR NSR.  WAITING FOR PLACEMENT AT [facility].  WILL CONT TO MONITOR FOR CHANGES.

## 2023-03-09 NOTE — THERAPY TREATMENT NOTE
Patient Name: Dorothy Zavala  : 1945    MRN: [id number]                              Today's Date: 3/9/2023       Admit Date: 2023    Visit Dx:     ICD-10-CM ICD-9-CM   1. Cognitive communication deficit  R41.841 799.52   2. Dysphagia, unspecified type  R13.10 787.20     Patient Active Problem List   Diagnosis   • Suspected acute ischemic stroke    • Hypertension   • Anxiety and depression   • Hypothyroidism   • Arthritis   • COPD   • B-cell lymphoma    • ? Subdural hematoma   • Frequent falls   • Left wrist fracture   • Illicit drug use (UDS + opiates, BZDs, and TCAs)   • Encephalopathy acute     Past Medical History:   Diagnosis Date   • Anxiety and depression 2023   • Arthritis 2023   • B-cell lymphoma  2023   • COPD 2023   • Hypertension 2023   • Hypothyroidism 2023     History reviewed. No pertinent surgical history.   General Information     Row Name 23 1337          Physical Therapy Time and Intention    Document Type therapy note (daily note)  -AS     Mode of Treatment physical therapy  -AS     Row Name 23 133          General Information    Patient Profile Reviewed yes  -AS     Existing Precautions/Restrictions fall;left;non-weight bearing;other (see comments)  NWB L UE in wrist splint AAT  -AS     Barriers to Rehab medically complex  -AS     Row Name 23          Cognition    Orientation Status (Cognition) oriented x 3  -AS     Row Name 23          Safety Issues, Functional Mobility    Safety Issues Affecting Function (Mobility) awareness of need for assistance;insight into deficits/self-awareness;positioning of assistive device;safety precaution awareness;safety precautions follow-through/compliance  -AS     Impairments Affecting Function (Mobility) balance;endurance/activity tolerance;strength  -AS     Cognitive Impairments, Mobility Safety/Performance safety precaution awareness;safety precaution follow-through  -AS     Comment,  Safety Issues/Impairments (Mobility) cues for maintaining NWB L UE  -AS           User Key  (r) = Recorded By, (t) = Taken By, (c) = Cosigned By    Initials Name Provider Type    AS Danisha Rai PTA Physical Therapist Assistant               Mobility     Row Name 03/09/23 1345          Bed Mobility    Supine-Sit Candler (Bed Mobility) supervision  -AS     Sit-Supine Candler (Bed Mobility) not tested  -AS     Comment, (Bed Mobility) cues to maintain NWB L UE  -AS     Row Name 03/09/23 1345          Transfers    Comment, (Transfers) STC for support, intitial c/o dizziness but resolved after a few seconds  -AS     Row Name 03/09/23 1345          Bed-Chair Transfer    Bed-Chair Candler (Transfers) verbal cues;contact guard;1 person assist  -AS     Assistive Device (Bed-Chair Transfers) cane, straight  -AS     Row Name 03/09/23 1345          Sit-Stand Transfer    Sit-Stand Candler (Transfers) verbal cues;contact guard;1 person assist  -AS     Assistive Device (Sit-Stand Transfers) cane, straight  -AS     Row Name 03/09/23 1345          Gait/Stairs (Locomotion)    Candler Level (Gait) verbal cues;contact guard;1 person assist  -AS     Assistive Device (Gait) cane, straight  -AS     Distance in Feet (Gait) 130  -AS     Deviations/Abnormal Patterns (Gait) cuba decreased;stride length decreased;gait speed decreased  -AS     Bilateral Gait Deviations forward flexed posture  -AS     Comment, (Gait/Stairs) patient ambulated 130 feet with CGA x1 and STC for support, cues for proper cane placement, no LOB or unsteadiness noticed. Cues to not reach out for hadnrail in hallway with L UE due to NWB status. Distance limited by weakness and fatigue.  -AS           User Key  (r) = Recorded By, (t) = Taken By, (c) = Cosigned By    Initials Name Provider Type    AS Danisha Rai PTA Physical Therapist Assistant               Obj/Interventions     Row Name 03/09/23 1347          Motor Skills     Therapeutic Exercise knee;ankle  -AS     Row Name 03/09/23 1347          Knee (Therapeutic Exercise)    Knee (Therapeutic Exercise) strengthening exercise  -AS     Knee Strengthening (Therapeutic Exercise) bilateral;marching while seated;LAQ (long arc quad);sitting;10 repetitions;heel slides;supine  -AS     Row Name 03/09/23 1347          Ankle (Therapeutic Exercise)    Ankle (Therapeutic Exercise) AROM (active range of motion)  -AS     Ankle AROM (Therapeutic Exercise) bilateral;dorsiflexion;plantarflexion;sitting;10 repetitions  -AS     Row Name 03/09/23 1347          Balance    Dynamic Standing Balance verbal cues;contact guard;1-person assist  -AS     Position/Device Used, Standing Balance supported;cane, straight  -AS           User Key  (r) = Recorded By, (t) = Taken By, (c) = Cosigned By    Initials Name Provider Type    AS Danisha Rai, LALY Physical Therapist Assistant               Goals/Plan    No documentation.                Clinical Impression     [facility] Name 03/09/23 1348          Pain    Pretreatment Pain Rating 0/10 - no pain  -AS     Posttreatment Pain Rating 0/10 - no pain  -AS     Row Name 03/09/23 1348          Plan of Care Review    Plan of Care Reviewed With patient  -AS     Progress improving  -AS     Outcome Evaluation patient ambulated 130 feet with CGA x1 and STC for support, cues for proper cane placement, no LOB or unsteadiness noticed. Cues to not reach out for handrail in hallway with L UE due to NWB status. Distance limited by weakness and fatigue. Progress limited by decreased safety awareness and weakness. Recommend SNF at d/c.  -AS     [facility] Name 03/09/23 1348          Positioning and Restraints    Pre-Treatment Position in bed  -AS     Post Treatment Position chair  -AS     In Chair reclined;call light within reach;encouraged to call for assist;exit alarm on;waffle cushion;legs elevated;LUE elevated  -AS           User Key  (r) = Recorded By, (t) = Taken By, (c) = Cosigned By     Initials Name Provider Type    AS Danisha Rai PTA Physical Therapist Assistant               Outcome Measures     Row Name 03/09/23 1349          How much help from another person do you currently need...    Turning from your back to your side while in flat bed without using bedrails? 4  -AS     Moving from lying on back to sitting on the side of a flat bed without bedrails? 4  -AS     Moving to and from a bed to a chair (including a wheelchair)? 3  -AS     Climbing 3-5 steps with a railing? 3  -AS     To walk in hospital room? 3  -AS     Row Name 03/09/23 1349          Modified Malden Scale    Modified Malden Scale 4 - Moderately severe disability.  Unable to walk without assistance, and unable to attend to own bodily needs without assistance.  -AS     Row Name 03/09/23 1349          Functional Assessment    Outcome Measure Options AM-PAC 6 Clicks Basic Mobility (PT)  -AS           User Key  (r) = Recorded By, (t) = Taken By, (c) = Cosigned By    Initials Name Provider Type    AS Danisha Rai PTA Physical Therapist Assistant                             Physical Therapy Education     Title: PT OT SLP Therapies (In Progress)     Topic: Physical Therapy (In Progress)     Point: Mobility training (In Progress)     Learning Progress Summary           Patient Acceptance, E, NR by AS at 3/9/2023 1349    Acceptance, E,D, VU,DU by AB at 3/8/2023 1331    Acceptance, E, VU,NR by LO at 3/7/2023 1319    Comment: PT POC    Acceptance, E,TB, VU by KW at 3/6/2023 1418    Comment: cotninued benefit of skilled PT services    Acceptance, E, VU,NR by KG at 3/3/2023 0846    Acceptance, E, VU by SJ at 3/1/2023 1623    Acceptance, E, NR by AS at 2/27/2023 0958    Acceptance, E, VU by CM at 2/24/2023 1449   Family Acceptance, E, VU by CM at 2/24/2023 1449                   Point: Home exercise program (In Progress)     Learning Progress Summary           Patient Acceptance, E, NR by AS at 3/9/2023 1349    Acceptance, E,  VU,NR by LO at 3/7/2023 1319    Comment: PT POC    Acceptance, E,TB, VU by KW at 3/6/2023 1418    Comment: cotninued benefit of skilled PT services    Acceptance, E, VU,NR by KG at 3/3/2023 0846    Acceptance, E, VU by SJ at 3/1/2023 1623    Acceptance, E, NR by AS at 2/27/2023 0958                   Point: Body mechanics (In Progress)     Learning Progress Summary           Patient Acceptance, E, NR by AS at 3/9/2023 1349    Acceptance, E,D, VU,DU by AB at 3/8/2023 1331    Acceptance, E, VU,NR by LO at 3/7/2023 1319    Comment: PT POC    Acceptance, E,TB, VU by KW at 3/6/2023 1418    Comment: cotninued benefit of skilled PT services    Acceptance, E, VU,NR by KG at 3/3/2023 0846    Acceptance, E, VU by SJ at 3/1/2023 1623    Acceptance, E, NR by AS at 2/27/2023 0958                   Point: Precautions (In Progress)     Learning Progress Summary           Patient Acceptance, E, NR by AS at 3/9/2023 1349    Acceptance, E,D, VU,DU by AB at 3/8/2023 1331    Acceptance, E, VU,NR by LO at 3/7/2023 1319    Comment: PT POC    Acceptance, E,TB, VU by KW at 3/6/2023 1418    Comment: cotninued benefit of skilled PT services    Acceptance, E, VU,NR by KG at 3/3/2023 0846    Acceptance, E, VU by SJ at 3/1/2023 1623    Acceptance, E, NR by AS at 2/27/2023 0958    Acceptance, E, VU by CM at 2/24/2023 1449   Family Acceptance, E, VU by CM at 2/24/2023 1449                               User Key     Initials Effective Dates Name Provider Type Discipline    SJ 02/03/23 -  Alyssia Saini, PT Physical Therapist PT    AS 02/03/23 -  Danisha Rai, PTA Physical Therapist Assistant PT    KG 05/22/20 -  Shanelle Roach, PT Physical Therapist PT    LO 06/16/21 -  Lynn Ferraro, PT Physical Therapist PT    KW 01/27/22 -  Hailey Zimmerman, PT Physical Therapist PT    AB 09/22/22 -  Danisha Mello, PT Physical Therapist PT    CM 09/22/22 -  Kiana Davis, PT Physical Therapist PT              PT Recommendation and  Plan     Plan of Care Reviewed With: patient  Progress: improving  Outcome Evaluation: patient ambulated 130 feet with CGA x1 and STC for support, cues for proper cane placement, no LOB or unsteadiness noticed. Cues to not reach out for handrail in hallway with L UE due to NWB status. Distance limited by weakness and fatigue. Progress limited by decreased safety awareness and weakness. Recommend SNF at d/c.     Time Calculation:    PT Charges     Row Name 03/09/23 1350             Time Calculation    Start Time 1309  -AS      PT Received On 03/09/23  -AS      PT Goal Re-Cert Due Date 03/16/23  -AS         Timed Charges    07315 - PT Therapeutic Exercise Minutes 11  -AS      87390 - Gait Training Minutes  13  -AS         Total Minutes    Timed Charges Total Minutes 24  -AS       Total Minutes 24  -AS            User Key  (r) = Recorded By, (t) = Taken By, (c) = Cosigned By    Initials Name Provider Type    AS Danisha Rai PTA Physical Therapist Assistant              Therapy Charges for Today     Code Description Service Date Service Provider Modifiers Qty    36855384588 HC PT THER PROC EA 15 MIN 3/9/2023 Danisha Rai PTA GP 1    41301567605 HC GAIT TRAINING EA 15 MIN 3/9/2023 Danisha Rai, LALY GP 1          PT G-Codes  Outcome Measure Options: AM-PAC 6 Clicks Basic Mobility (PT)  AM-PAC 6 Clicks Score (PT): 21  AM-PAC 6 Clicks Score (OT): 18  Modified Didier Scale: 4 - Moderately severe disability.  Unable to walk without assistance, and unable to attend to own bodily needs without assistance.       Danisha Rai PTA  3/9/2023

## 2023-03-09 NOTE — PLAN OF CARE
Goal Outcome Evaluation:  Plan of Care Reviewed With: patient        Progress: improving  Outcome Evaluation: patient ambulated 130 feet with CGA x1 and STC for support, cues for proper cane placement, no LOB or unsteadiness noticed. Cues to not reach out for handrail in hallway with L UE due to NWB status. Distance limited by weakness and fatigue. Progress limited by decreased safety awareness and weakness. Recommend SNF at d/c.

## 2023-03-10 LAB
ANION GAP SERPL CALCULATED.3IONS-SCNC: 10 MMOL/L (ref 5–15)
BASOPHILS # BLD AUTO: 0.04 10*3/MM3 (ref 0–0.2)
BASOPHILS NFR BLD AUTO: 0.4 % (ref 0–1.5)
BUN SERPL-MCNC: 14 MG/DL (ref 8–23)
BUN/CREAT SERPL: 22.2 (ref 7–25)
CALCIUM SPEC-SCNC: 9.1 MG/DL (ref 8.6–10.5)
CHLORIDE SERPL-SCNC: 102 MMOL/L (ref 98–107)
CO2 SERPL-SCNC: 27 MMOL/L (ref 22–29)
CREAT SERPL-MCNC: 0.63 MG/DL (ref 0.57–1)
DEPRECATED RDW RBC AUTO: 48.4 FL (ref 37–54)
EGFRCR SERPLBLD CKD-EPI 2021: 92.6 ML/MIN/1.73
EOSINOPHIL # BLD AUTO: 0.11 10*3/MM3 (ref 0–0.4)
EOSINOPHIL NFR BLD AUTO: 1.2 % (ref 0.3–6.2)
ERYTHROCYTE [DISTWIDTH] IN BLOOD BY AUTOMATED COUNT: 13.9 % (ref 12.3–15.4)
GLUCOSE SERPL-MCNC: 119 MG/DL (ref 65–99)
HCT VFR BLD AUTO: 42.3 % (ref 34–46.6)
HGB BLD-MCNC: 13.1 G/DL (ref 12–15.9)
IMM GRANULOCYTES # BLD AUTO: 0.04 10*3/MM3 (ref 0–0.05)
IMM GRANULOCYTES NFR BLD AUTO: 0.4 % (ref 0–0.5)
LYMPHOCYTES # BLD AUTO: 1.2 10*3/MM3 (ref 0.7–3.1)
LYMPHOCYTES NFR BLD AUTO: 12.7 % (ref 19.6–45.3)
MCH RBC QN AUTO: 29 PG (ref 26.6–33)
MCHC RBC AUTO-ENTMCNC: 31 G/DL (ref 31.5–35.7)
MCV RBC AUTO: 93.8 FL (ref 79–97)
MONOCYTES # BLD AUTO: 0.64 10*3/MM3 (ref 0.1–0.9)
MONOCYTES NFR BLD AUTO: 6.8 % (ref 5–12)
NEUTROPHILS NFR BLD AUTO: 7.45 10*3/MM3 (ref 1.7–7)
NEUTROPHILS NFR BLD AUTO: 78.5 % (ref 42.7–76)
NRBC BLD AUTO-RTO: 0 /100 WBC (ref 0–0.2)
PLATELET # BLD AUTO: 269 10*3/MM3 (ref 140–450)
PMV BLD AUTO: 10 FL (ref 6–12)
POTASSIUM SERPL-SCNC: 4.1 MMOL/L (ref 3.5–5.2)
RBC # BLD AUTO: 4.51 10*6/MM3 (ref 3.77–5.28)
SODIUM SERPL-SCNC: 139 MMOL/L (ref 136–145)
WBC NRBC COR # BLD: 9.48 10*3/MM3 (ref 3.4–10.8)

## 2023-03-10 PROCEDURE — 99232 SBSQ HOSP IP/OBS MODERATE 35: CPT | Performed by: NURSE PRACTITIONER

## 2023-03-10 PROCEDURE — 85025 COMPLETE CBC W/AUTO DIFF WBC: CPT | Performed by: FAMILY MEDICINE

## 2023-03-10 PROCEDURE — 80048 BASIC METABOLIC PNL TOTAL CA: CPT | Performed by: FAMILY MEDICINE

## 2023-03-10 RX ADMIN — OXYCODONE HYDROCHLORIDE AND ACETAMINOPHEN 1 TABLET: 5; 325 TABLET ORAL at 22:19

## 2023-03-10 RX ADMIN — SERTRALINE HYDROCHLORIDE 50 MG: 50 TABLET ORAL at 09:00

## 2023-03-10 RX ADMIN — ATORVASTATIN CALCIUM 80 MG: 40 TABLET, FILM COATED ORAL at 22:19

## 2023-03-10 RX ADMIN — OXYCODONE HYDROCHLORIDE AND ACETAMINOPHEN 1 TABLET: 5; 325 TABLET ORAL at 01:03

## 2023-03-10 RX ADMIN — AMLODIPINE BESYLATE 5 MG: 5 TABLET ORAL at 09:00

## 2023-03-10 RX ADMIN — OXYCODONE HYDROCHLORIDE AND ACETAMINOPHEN 1 TABLET: 5; 325 TABLET ORAL at 12:00

## 2023-03-10 RX ADMIN — PANTOPRAZOLE SODIUM 40 MG: 40 TABLET, DELAYED RELEASE ORAL at 06:16

## 2023-03-10 RX ADMIN — HYDROXYZINE HYDROCHLORIDE 25 MG: 25 TABLET ORAL at 22:19

## 2023-03-10 RX ADMIN — LEVOTHYROXINE SODIUM 100 MCG: 0.1 TABLET ORAL at 06:16

## 2023-03-10 RX ADMIN — ASPIRIN 81 MG CHEWABLE TABLET 81 MG: 81 TABLET CHEWABLE at 09:00

## 2023-03-10 RX ADMIN — OXYCODONE HYDROCHLORIDE AND ACETAMINOPHEN 1 TABLET: 5; 325 TABLET ORAL at 06:16

## 2023-03-10 RX ADMIN — OXYCODONE HYDROCHLORIDE AND ACETAMINOPHEN 1 TABLET: 5; 325 TABLET ORAL at 18:35

## 2023-03-10 NOTE — PROGRESS NOTES
[facility]  PROGRESS NOTE    Patient Name: Dorothy Zavala  : 1945  MRN: [id number]    Date of Admission: 2023  Primary Care Physician: Provider, No Known    Subjective   Subjective     CC:  Follow up weakness    HPI:  Patient seen resting in bed with eyes closed. Appeared to be sleeping. Easily awakened to voice. No acute events overnight per nursing. Awaiting SNF placement.     ROS:  Gen- No fevers, chills  CV- No chest pain, palpitations  Resp- No cough, dyspnea  GI- No N/V/D, abd pain    Objective   Objective     Vital Signs:   Temp:  [98.3 °F (36.8 °C)-98.9 °F (37.2 °C)] 98.3 °F (36.8 °C)  Heart Rate:  [79] 79  Resp:  [18] 18  BP: (148-169)/(66-83) 148/66     Physical Exam:  Constitutional: No acute distress, awake, alert  HENT: NCAT, mucous membranes moist  Respiratory: Clear to auscultation bilaterally, respiratory effort normal   Cardiovascular: RRR, no murmurs, cap refill brisk   Gastrointestinal: Positive bowel sounds, soft, nontender, nondistended  Musculoskeletal: No BLE edema, left wrist splint   Psychiatric: Appropriate affect, cooperative  Neurologic: Oriented x 3, moves all extremities, speech clear  Skin: warm, dry, no visible rash     Results Reviewed:  LAB RESULTS:                              Brief Urine Lab Results  (Last result in the past 365 days)      Color   Clarity   Blood   Leuk Est   Nitrite   Protein   CREAT   Urine HCG        23 1449 Yellow   Cloudy   Negative   Negative   Negative   Trace                 Microbiology Results Abnormal     Procedure Component Value - Date/Time    Fungus Culture - Cerebrospinal Fluid, Lumbar Puncture [[id number]]  (Normal) Collected: 23 1246    Lab Status: Preliminary result Specimen: Cerebrospinal Fluid from Lumbar Puncture Updated: 23 1345     Fungus Culture No fungus isolated at 1 week    Culture, CSF - Cerebrospinal Fluid, Lumbar Puncture [[id number]] Collected: 23 1246    Lab  Status: Final result Specimen: Cerebrospinal Fluid from Lumbar Puncture Updated: 03/02/23 1008     CSF Culture No growth at 3 days     Gram Stain Rare (1+) WBCs seen      No organisms seen    Blood Culture - Blood, Hand, Right [[id number]]  (Normal) Collected: 02/23/23 2122    Lab Status: Final result Specimen: Blood from Hand, Right Updated: 02/28/23 2146     Blood Culture No growth at 5 days    Narrative:      Aerobic bottle only      Blood Culture - Blood, Hand, Left [[id number]]  (Normal) Collected: 02/23/23 2122    Lab Status: Final result Specimen: Blood from Hand, Left Updated: 02/28/23 2146     Blood Culture No growth at 5 days    Narrative:      Aerobic bottle only      Meningitis / Encephalitis Panel, PCR - Cerebrospinal Fluid, Lumbar Puncture [[id number]]  (Normal) Collected: 02/27/23 1246    Lab Status: Final result Specimen: Cerebrospinal Fluid from Lumbar Puncture Updated: 02/27/23 1458     ESCHERICHIA COLI K1, PCR Not Detected     HAEMOPHILUS INFLUENZAE, PCR Not Detected     LISTERIA MONOCYTOGENES, PCR Not Detected     NEISSERIA MENINGITIDIS, PCR Not Detected     STREPTOCOCCUS AGALACTIAE, PCR Not Detected     STREPTOCOCCUS PNEUMONIAE, PCR Not Detected     CYTOMEGALOVIRUS (CMV), PCR Not Detected     ENTEROVIRUS, PCR Not Detected     HERPES SIMPLEX VIRUS 1 (HSV-1), PCR Not Detected     HERPES SIMPLEX VIRUS 2 (HSV-2), PCR Not Detected     HUMAN PARECHOVIRUS, PCR Not Detected     VARICELLA ZOSTER VIRUS (VZV), PCR Not Detected     CRYPTOCOCCUS NEOFORMANS / GATTII, PCR Not Detected     HUMAN HERPES VIRUS 6 PCR Not Detected    Cryptococcal AG, CSF - Cerebrospinal Fluid, Lumbar Puncture [[id number]]  (Normal) Collected: 02/27/23 1246    Lab Status: Final result Specimen: Cerebrospinal Fluid from Lumbar Puncture Updated: 02/27/23 1435     Cryptococcal Antigen, CSF Negative          No radiology results from the last 24 hrs    Results for orders placed during the hospital encounter of 02/23/23    Adult  Transthoracic Echo Complete W/ Cont if Necessary Per Protocol (With Agitated Saline)    Interpretation Summary  •  Left ventricular systolic function is normal. Calculated left ventricular EF = 67.2%  •  Left ventricular diastolic function was normal.  •  Saline test results are negative for right to left atrial level shunt.  •  Estimated right ventricular systolic pressure from tricuspid regurgitation is normal (<35 mmHg).      Current medications:  Scheduled Meds:amLODIPine, 5 mg, Oral, Q24H  aspirin, 81 mg, Oral, Daily   Or  aspirin, 300 mg, Rectal, Daily  atorvastatin, 80 mg, Oral, Nightly  levothyroxine, 100 mcg, Oral, Q AM  melatonin, 10 mg, Oral, Nightly  pantoprazole, 40 mg, Oral, Q AM  sertraline, 50 mg, Oral, Daily  sodium chloride, 10 mL, Intravenous, Q12H      Continuous Infusions:   PRN Meds:.•  hydrOXYzine  •  Magnesium Standard Dose Replacement - Initiate Nurse / BPA Driven Protocol  •  oxyCODONE-acetaminophen  •  Phosphorus Replacement - Initiate Nurse / BPA Driven Protocol  •  Potassium Replacement - Initiate Nurse / BPA Driven Protocol  •  sodium chloride  •  sodium chloride    Assessment & Plan   Assessment & Plan     Active Hospital Problems    Diagnosis  POA   • **Encephalopathy acute [G93.40]  Yes   • Suspected acute ischemic stroke  [I63.9]  Yes   • Hypertension [I10]  Yes   • Anxiety and depression [F41.9, F32.A]  Yes   • Hypothyroidism [E03.9]  Yes   • Arthritis [M19.90]  Yes   • COPD [J44.9]  Yes   • B-cell lymphoma  [C85.90]  Yes   • ? Subdural hematoma [S06.5XAA]  Yes   • Frequent falls [R29.6]  Not Applicable   • Left wrist fracture [S62.102A]  Yes   • Illicit drug use (UDS + opiates, BZDs, and TCAs) [F19.90]  Yes      Resolved Hospital Problems   No resolved problems to display.        Brief Hospital Course to date:  Dorothy Zavala is a 77 y.o. female history of hypothyroidism, chronic back pain, osteoarthritis, COPD, anxiety/depression, suspected low-grade B-cell lymphoma (diagnosed 2021  "via gastric biopsy previously followed by Dr. Dukes in [facility]), current falls with numerous fractures, who presented to [facility] on 2/23/2023 after being  transferred from [facility] for evaluation of stroke.Stroke imaging was negative. Per report, history from son (who is apparently estranged from his mother) suggests that she has had declining neurologic status for some time. Patient started on antibiotics initially for empiric coverage of meningitis per neurology.  They no longer feels her symptoms are concerning for meningitis and their note states that it is okay to stop antibiotics. LP in IR completed which was negative. PT/OT recommended SNF at discharge. CM is following for placement.      This patient's problems and plans were partially entered by my partner and updated as appropriate by me 03/10/23.     Encephalopathy  - Improved. Possible alcohol/medication withdrawal  - Neurology initially concerned for chronic meningitis. LP per IR on 2/27/23 stable- culture negative and meningitis/encephalitis panel negative.  - Improving off abx, now back to baseline.   - PT/OT/SLP, referrals pending.     Depression  - Patient complaining of depression but states she no longer wants to take her Trazodone. My partner had a Long d/w her son who thinks depression is playing a large role in her decline particularly due to recent passing of her brother and recent loss of her house. He asked that we be \"tough on her\" otherwise she will not do much for herself.  - Started on Zoloft 50mg PO daily which seems to have helped some. Increase as tolerated as outpatient.     HTN  -continue amlodipine   -she had been refusing antihypertensives.      CVA/TIA ruled out.  - Continue asa/statin     Hypothyroidism  - Continue synthroid     Lymphoma  - Needs oncology follow up     Wrist fracture, L  - Seen by orthopedic surgery, Dr. Ramos-  Recommends patient return in 1 to 2 weeks to clinic for " repeat radiographs.  Okay for weightbearing through elbow left upper extremity, otherwise nonweightbearing left upper extremity     Expected Discharge Location and Transportation: [facility]  Expected Discharge        Expected Discharge Date and Time      Expected Discharge Date Expected Discharge Time     Mar 11, 2023            DVT prophylaxis:  Mechanical DVT prophylaxis orders are present.     AM-PAC 6 Clicks Score (PT): 21 (03/08/23 1331)    CODE STATUS:   Code Status and Medical Interventions:   Ordered at: 02/23/23 1516     Code Status (Patient has no pulse and is not breathing):    CPR (Attempt to Resuscitate)     Medical Interventions (Patient has pulse or is breathing):    Full Support       Star Vanegas, APRN  03/10/23

## 2023-03-10 NOTE — PROGRESS NOTES
Clinical Nutrition     Patient Name: Dorothy Zavala  YOB: 1945  MRN: [id number]  Date of Encounter: 03/10/23 11:55 EST  Admission date: 2023    Reason for Visit   Follow up    EMR reviewed    Yes    Diet Nutrition Related History     Patient reports a good appetite/intake but was getting tired of the menu choices offered by her . Gave patient a menu and pointed out the always available options to broaden her selections. Patient denies any current diarrhea and last BM was on 3/8/23. Patient states she has had nausea (no vomiting) and can't eat well when she gets a migraine which she has had twice since admission. Patient declined offer of ONS.    3/3 Previous diet tech note:  Patient reports her appetite/intake is much better than when she first got here. Patient denies any known food allergies, denies any issues with chew/swallow, (SLP did a swallow eval on 23 and they determined a regular texture and thin liquids) and denies any N/V/D. Patient's last BM was yesterday. Patient states she is going to d/c to rehab today.     Current Nutrition Prescription    Diet: Cardiac Diets; Healthy Heart (2-3 Na+); Texture: Regular Texture (IDDSI 7); Fluid Consistency: Thin (IDDSI 0)  No active supplement orders    Average Intake from [place]% x 7    Actions:    Follow treatment progress, Care plan reviewed, Advise alternate selection, Interview for preferences, Menu provided, Encourage intake, Supplement offered/refused    Monitor Per Protocol    Mirian Stanton,   Time Spent: 20 minutes

## 2023-03-10 NOTE — CASE MANAGEMENT/SOCIAL WORK
Discharge Planning Assessment  [facility]     Patient Name: Dorothy Zavala  MRN: [id number]  Today's Date: 3/10/2023    Admit Date: 2/23/2023    Plan: [facility], pending insurance auth   Discharge Needs Assessment    No documentation.                Discharge Plan     Row Name 03/10/23 1613       Plan    Plan [facility], pending insurance auth    Patient/Family in Agreement with Plan yes    Plan Comments Followed up with Ms. Zavala for discharge planning.    Ms. Zavala is still being followed by Jacqueline at [facility], for skilled rehab.  Discussed DC plan after rehab, and now Ms. Zavala is stating that she does not have anywhere to go after discharge.  Contacted Jacqueline and she will work on admitting the patient for skilled and then long term Medicaid placement.  The facility will initiate a prior auth with Ms. Zavala's [employer] Medicare today.    CM will follow up.    Final Discharge Disposition Code 03 - skilled nursing facility (SNF)              Continued Care and Services - Admitted Since 2/23/2023     Destination     Service Provider Request Status Selected Services Address Phone Fax Patient Preferred    [facility] [address] [facility] [id number] [phone] --              Expected Discharge Date and Time     Expected Discharge Date Expected Discharge Time    Mar 13, 2023                    Marta Kiran RN     Render Post-Care Instructions In Note?: no Acne Type: Comedonal Lesions Detail Level: Detailed Prep Text (Optional): Prior to removal the treatment areas were prepped in the usual fashion. Consent was obtained and risks were reviewed including but not limited to scarring, infection, bleeding, scabbing, incomplete removal, and allergy to anesthesia. Post-Care Instructions: I reviewed with the patient in detail post-care instructions. Patient is to keep the treatment areaas dry overnight, and then apply bacitracin twice daily until healed. Patient may apply hydrogen peroxide soaks to remove any crusting.

## 2023-03-10 NOTE — PLAN OF CARE
Goal Outcome Evaluation:  Plan of Care Reviewed With: patient        Progress: improving  Outcome Evaluation: PT still with intermittent c/o left arm pain; improved w/ percocet x 2. VSS, on room air. A+O x 4 all shift, up to chair. Pleasant and cooperative. Hopeful to d/c soon.

## 2023-03-11 ENCOUNTER — APPOINTMENT (OUTPATIENT)
Dept: CT IMAGING | Facility: HOSPITAL | Age: 78
DRG: 884 | End: 2023-03-11
Payer: MEDICARE

## 2023-03-11 PROCEDURE — 25510000001 IOPAMIDOL 61 % SOLUTION: Performed by: FAMILY MEDICINE

## 2023-03-11 PROCEDURE — 71260 CT THORAX DX C+: CPT

## 2023-03-11 PROCEDURE — 99232 SBSQ HOSP IP/OBS MODERATE 35: CPT | Performed by: NURSE PRACTITIONER

## 2023-03-11 RX ORDER — NYSTATIN 100000 [USP'U]/G
POWDER TOPICAL EVERY 12 HOURS SCHEDULED
Status: DISCONTINUED | OUTPATIENT
Start: 2023-03-11 | End: 2023-03-17 | Stop reason: HOSPADM

## 2023-03-11 RX ORDER — OXYCODONE HYDROCHLORIDE AND ACETAMINOPHEN 5; 325 MG/1; MG/1
1 TABLET ORAL EVERY 4 HOURS PRN
Status: DISCONTINUED | OUTPATIENT
Start: 2023-03-11 | End: 2023-03-17

## 2023-03-11 RX ORDER — NIFEDIPINE 30 MG/1
30 TABLET, EXTENDED RELEASE ORAL
Status: DISCONTINUED | OUTPATIENT
Start: 2023-03-11 | End: 2023-03-17 | Stop reason: HOSPADM

## 2023-03-11 RX ADMIN — AMLODIPINE BESYLATE 5 MG: 5 TABLET ORAL at 09:36

## 2023-03-11 RX ADMIN — OXYCODONE HYDROCHLORIDE AND ACETAMINOPHEN 1 TABLET: 5; 325 TABLET ORAL at 14:19

## 2023-03-11 RX ADMIN — OXYCODONE HYDROCHLORIDE AND ACETAMINOPHEN 1 TABLET: 5; 325 TABLET ORAL at 03:57

## 2023-03-11 RX ADMIN — ATORVASTATIN CALCIUM 80 MG: 40 TABLET, FILM COATED ORAL at 22:08

## 2023-03-11 RX ADMIN — OXYCODONE HYDROCHLORIDE AND ACETAMINOPHEN 1 TABLET: 5; 325 TABLET ORAL at 18:17

## 2023-03-11 RX ADMIN — ASPIRIN 81 MG CHEWABLE TABLET 81 MG: 81 TABLET CHEWABLE at 09:36

## 2023-03-11 RX ADMIN — NIFEDIPINE 30 MG: 30 TABLET, FILM COATED, EXTENDED RELEASE ORAL at 14:12

## 2023-03-11 RX ADMIN — OXYCODONE HYDROCHLORIDE AND ACETAMINOPHEN 1 TABLET: 5; 325 TABLET ORAL at 09:36

## 2023-03-11 RX ADMIN — HYDROXYZINE HYDROCHLORIDE 25 MG: 25 TABLET ORAL at 22:09

## 2023-03-11 RX ADMIN — OXYCODONE HYDROCHLORIDE AND ACETAMINOPHEN 1 TABLET: 5; 325 TABLET ORAL at 22:09

## 2023-03-11 RX ADMIN — LEVOTHYROXINE SODIUM 100 MCG: 0.1 TABLET ORAL at 06:18

## 2023-03-11 RX ADMIN — NYSTATIN: 100000 POWDER TOPICAL at 14:13

## 2023-03-11 RX ADMIN — PANTOPRAZOLE SODIUM 40 MG: 40 TABLET, DELAYED RELEASE ORAL at 06:18

## 2023-03-11 RX ADMIN — SERTRALINE HYDROCHLORIDE 50 MG: 50 TABLET ORAL at 09:37

## 2023-03-11 RX ADMIN — IOPAMIDOL 75 ML: 612 INJECTION, SOLUTION INTRAVENOUS at 14:57

## 2023-03-11 RX ADMIN — Medication 10 ML: at 22:10

## 2023-03-11 NOTE — PROGRESS NOTES
[facility]  PROGRESS NOTE    Patient Name: Dorothy Zavala  : 1945  MRN: [id number]    Date of Admission: 2023  Primary Care Physician: Provider, No Known    Subjective   Subjective     CC:  Follow-up weakness    HPI:  Patient seen resting in bed no apparent distress.  No acute events overnight per nursing.  States that she is feeling well today but does note concern of new right-sided breast mass.  She notes that the mass has been present for couple days.  She first noticed the mass after PICC line was removed.  The nodule is irregularly shaped, non-mobile, firm, and non-tender to palpation. No additional concerns at this time.     ROS:  Gen- No fevers, chills  CV- No chest pain, palpitations  Resp- No cough, dyspnea  GI- No N/V/D, abd pain  MSK- +right breast mass    Objective   Objective     Vital Signs:   Temp:  [98 °F (36.7 °C)-98.7 °F (37.1 °C)] 98 °F (36.7 °C)  Heart Rate:  [70-92] 76  Resp:  [18] 18  BP: (139-167)/(70-92) 167/92     Physical Exam:  Constitutional: No acute distress, awake, alert  HENT: NCAT, mucous membranes moist  Respiratory: Clear to auscultation bilaterally, respiratory effort normal   Cardiovascular: RRR, no murmurs, cap refill brisk   Gastrointestinal: Positive bowel sounds, soft, nontender, nondistended  Musculoskeletal: No BLE edema, large approximate 2 inch breast nodule palpated, irregularly shape, non-mobile, firm, non-tender to palpation   Psychiatric: Appropriate affect, cooperative  Neurologic: Oriented x 3, moves all extremities, speech clear  Skin: warm, dry, no visible rash     Results Reviewed:  LAB RESULTS:      Lab 03/10/23  1121   WBC 9.48   HEMOGLOBIN 13.1   HEMATOCRIT 42.3   PLATELETS 269   NEUTROS ABS 7.45*   IMMATURE GRANS (ABS) 0.04   LYMPHS ABS 1.20   MONOS ABS 0.64   EOS ABS 0.11   MCV 93.8         Lab 03/10/23  1121   SODIUM 139   POTASSIUM 4.1   CHLORIDE 102   CO2 27.0   ANION GAP 10.0   BUN 14   CREATININE  0.63   EGFR 92.6   GLUCOSE 119*   CALCIUM 9.1                         Brief Urine Lab Results  (Last result in the past 365 days)      Color   Clarity   Blood   Leuk Est   Nitrite   Protein   CREAT   Urine HCG        02/24/23 1449 Yellow   Cloudy   Negative   Negative   Negative   Trace                 Microbiology Results Abnormal     Procedure Component Value - Date/Time    Fungus Culture - Cerebrospinal Fluid, Lumbar Puncture [[id number]]  (Normal) Collected: 02/27/23 1246    Lab Status: Preliminary result Specimen: Cerebrospinal Fluid from Lumbar Puncture Updated: 03/06/23 1345     Fungus Culture No fungus isolated at 1 week    Culture, CSF - Cerebrospinal Fluid, Lumbar Puncture [[id number]] Collected: 02/27/23 1246    Lab Status: Final result Specimen: Cerebrospinal Fluid from Lumbar Puncture Updated: 03/02/23 1008     CSF Culture No growth at 3 days     Gram Stain Rare (1+) WBCs seen      No organisms seen    Blood Culture - Blood, Hand, Right [[id number]]  (Normal) Collected: 02/23/23 2122    Lab Status: Final result Specimen: Blood from Hand, Right Updated: 02/28/23 2146     Blood Culture No growth at 5 days    Narrative:      Aerobic bottle only      Blood Culture - Blood, Hand, Left [[id number]]  (Normal) Collected: 02/23/23 2122    Lab Status: Final result Specimen: Blood from Hand, Left Updated: 02/28/23 2146     Blood Culture No growth at 5 days    Narrative:      Aerobic bottle only      Meningitis / Encephalitis Panel, PCR - Cerebrospinal Fluid, Lumbar Puncture [[id number]]  (Normal) Collected: 02/27/23 1246    Lab Status: Final result Specimen: Cerebrospinal Fluid from Lumbar Puncture Updated: 02/27/23 1458     ESCHERICHIA COLI K1, PCR Not Detected     HAEMOPHILUS INFLUENZAE, PCR Not Detected     LISTERIA MONOCYTOGENES, PCR Not Detected     NEISSERIA MENINGITIDIS, PCR Not Detected     STREPTOCOCCUS AGALACTIAE, PCR Not Detected     STREPTOCOCCUS PNEUMONIAE, PCR Not Detected     CYTOMEGALOVIRUS (CMV), PCR  Not Detected     ENTEROVIRUS, PCR Not Detected     HERPES SIMPLEX VIRUS 1 (HSV-1), PCR Not Detected     HERPES SIMPLEX VIRUS 2 (HSV-2), PCR Not Detected     HUMAN PARECHOVIRUS, PCR Not Detected     VARICELLA ZOSTER VIRUS (VZV), PCR Not Detected     CRYPTOCOCCUS NEOFORMANS / GATTII, PCR Not Detected     HUMAN HERPES VIRUS 6 PCR Not Detected    Cryptococcal AG, CSF - Cerebrospinal Fluid, Lumbar Puncture [[id number]]  (Normal) Collected: 02/27/23 1246    Lab Status: Final result Specimen: Cerebrospinal Fluid from Lumbar Puncture Updated: 02/27/23 1435     Cryptococcal Antigen, CSF Negative          No radiology results from the last 24 hrs    Results for orders placed during the hospital encounter of 02/23/23    Adult Transthoracic Echo Complete W/ Cont if Necessary Per Protocol (With Agitated Saline)    Interpretation Summary  •  Left ventricular systolic function is normal. Calculated left ventricular EF = 67.2%  •  Left ventricular diastolic function was normal.  •  Saline test results are negative for right to left atrial level shunt.  •  Estimated right ventricular systolic pressure from tricuspid regurgitation is normal (<35 mmHg).      Current medications:  Scheduled Meds:amLODIPine, 5 mg, Oral, Q24H  aspirin, 81 mg, Oral, Daily   Or  aspirin, 300 mg, Rectal, Daily  atorvastatin, 80 mg, Oral, Nightly  levothyroxine, 100 mcg, Oral, Q AM  melatonin, 10 mg, Oral, Nightly  pantoprazole, 40 mg, Oral, Q AM  sertraline, 50 mg, Oral, Daily  sodium chloride, 10 mL, Intravenous, Q12H      Continuous Infusions:   PRN Meds:.•  hydrOXYzine  •  Magnesium Standard Dose Replacement - Initiate Nurse / BPA Driven Protocol  •  oxyCODONE-acetaminophen  •  Phosphorus Replacement - Initiate Nurse / BPA Driven Protocol  •  Potassium Replacement - Initiate Nurse / BPA Driven Protocol  •  sodium chloride  •  sodium chloride    Assessment & Plan   Assessment & Plan     Active Hospital Problems    Diagnosis  POA   • **Encephalopathy acute  [G93.40]  Yes   • Suspected acute ischemic stroke  [I63.9]  Yes   • Hypertension [I10]  Yes   • Anxiety and depression [F41.9, F32.A]  Yes   • Hypothyroidism [E03.9]  Yes   • Arthritis [M19.90]  Yes   • COPD [J44.9]  Yes   • B-cell lymphoma  [C85.90]  Yes   • ? Subdural hematoma [S06.5XAA]  Yes   • Frequent falls [R29.6]  Not Applicable   • Left wrist fracture [S62.102A]  Yes   • Illicit drug use (UDS + opiates, BZDs, and TCAs) [F19.90]  Yes      Resolved Hospital Problems   No resolved problems to display.        Brief Hospital Course to date:  Dorothy Zavala is a 77 y.o. 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 Dukes in [facility]), current falls with numerous fractures, who presented to [facility] on 2/23/2023 after being  transferred from [facility] for evaluation of stroke.Stroke imaging was negative. Per report, history from son (who is apparently estranged from his mother) suggests that she has had declining neurologic status for some time. Patient started on antibiotics initially for empiric coverage of meningitis per neurology.  They no longer feels her symptoms are concerning for meningitis and their note states that it is okay to stop antibiotics. LP in IR completed which was negative. PT/OT recommended SNF at discharge. CM is following for placement.      This patient's problems and plans were partially entered by my partner and updated as appropriate by me 03/11/23.     Encephalopathy  - Improved. Possible alcohol/medication withdrawal  - Neurology initially concerned for chronic meningitis. LP per IR on 2/27/23 stable- culture negative and meningitis/encephalitis panel negative.  - Improving off abx, now back to baseline.   - PT/OT/SLP, referrals pending.     Depression  - Patient complaining of depression but states she no  "longer wants to take her Trazodone. My partner had a Long d/w her son who thinks depression is playing a large role in her decline particularly due to recent passing of her brother and recent loss of her house. He asked that we be \"tough on her\" otherwise she will not do much for herself.  - Started on Zoloft 50mg PO daily which seems to have helped some. Increase as tolerated as outpatient.    Right breast mass  -CT chest with contrast revealed nonspecific infiltration within the right breast with no definite drainable fluid collection. Recommended outpatient diagnostic mammogram  -family hx of breast CA   -Plan to discuss case with general surgeon Dr. MARROQUIN on Monday 3/13     Candidiasis   -Nystatin powder     HTN  -amlodipine d/c'd, patient felt was causing nausea  -Start Nifedipine 30 mg daily      CVA/TIA ruled out.  - Continue asa/statin     Hypothyroidism  - Continue synthroid     Lymphoma  - Needs oncology follow up     Wrist fracture, L  - Seen by orthopedic surgery on 2/26, Dr. Ramos-  Recommended patient return in 1 to 2 weeks to clinic for repeat radiographs.   -If still inpatient will need repeat Xrays on 3/13  -Okay for weightbearing through elbow left upper extremity, otherwise nonweightbearing left upper extremity.      Expected Discharge Location and Transportation: [facility]  Expected Discharge           Expected Discharge Date and Time      Expected Discharge Date Expected Discharge Time     Mar 11, 2023           DVT prophylaxis:  Mechanical DVT prophylaxis orders are present.     AM-PAC 6 Clicks Score (PT): 23 (03/10/23 0800)    CODE STATUS:   Code Status and Medical Interventions:   Ordered at: 02/23/23 1517     Code Status (Patient has no pulse and is not breathing):    CPR (Attempt to Resuscitate)     Medical Interventions (Patient has pulse or is breathing):    Full Support       Star Vanegas, APRN  03/11/23    "

## 2023-03-11 NOTE — PLAN OF CARE
Goal Outcome Evaluation:            VSS. Left wrist pain controlled w/ Percocet. Left wrist splint intact, minimal swelling noted, brisk capillary refill & warm independently movable digits. Awaiting LTC placement for discharge.

## 2023-03-11 NOTE — PLAN OF CARE
Goal Outcome Evaluation:           Progress: no change  Outcome Evaluation: A & O x 4, non tele, RA, c/o L wrist pain - see MAR, up with assist x 1, mass R breast/CT results pending, waiting for rehab bed at [facility], Nystatin added for yeast under R breast and abdominal panus, Amlodipine d/c'd due to reports of headaches, Alexandro 18/all skin measures in place, Falls score 18/bed alarm active

## 2023-03-12 PROCEDURE — 99232 SBSQ HOSP IP/OBS MODERATE 35: CPT | Performed by: NURSE PRACTITIONER

## 2023-03-12 RX ADMIN — SERTRALINE HYDROCHLORIDE 50 MG: 50 TABLET ORAL at 08:46

## 2023-03-12 RX ADMIN — Medication 10 ML: at 20:48

## 2023-03-12 RX ADMIN — OXYCODONE HYDROCHLORIDE AND ACETAMINOPHEN 1 TABLET: 5; 325 TABLET ORAL at 06:47

## 2023-03-12 RX ADMIN — OXYCODONE HYDROCHLORIDE AND ACETAMINOPHEN 1 TABLET: 5; 325 TABLET ORAL at 14:58

## 2023-03-12 RX ADMIN — ASPIRIN 81 MG CHEWABLE TABLET 81 MG: 81 TABLET CHEWABLE at 08:46

## 2023-03-12 RX ADMIN — LEVOTHYROXINE SODIUM 100 MCG: 0.1 TABLET ORAL at 06:47

## 2023-03-12 RX ADMIN — OXYCODONE HYDROCHLORIDE AND ACETAMINOPHEN 1 TABLET: 5; 325 TABLET ORAL at 10:54

## 2023-03-12 RX ADMIN — ATORVASTATIN CALCIUM 80 MG: 40 TABLET, FILM COATED ORAL at 20:47

## 2023-03-12 RX ADMIN — NYSTATIN: 100000 POWDER TOPICAL at 08:46

## 2023-03-12 RX ADMIN — HYDROXYZINE HYDROCHLORIDE 25 MG: 25 TABLET ORAL at 20:47

## 2023-03-12 RX ADMIN — PANTOPRAZOLE SODIUM 40 MG: 40 TABLET, DELAYED RELEASE ORAL at 06:47

## 2023-03-12 RX ADMIN — Medication 10 ML: at 08:46

## 2023-03-12 RX ADMIN — NYSTATIN: 100000 POWDER TOPICAL at 00:16

## 2023-03-12 RX ADMIN — NYSTATIN: 100000 POWDER TOPICAL at 20:47

## 2023-03-12 RX ADMIN — OXYCODONE HYDROCHLORIDE AND ACETAMINOPHEN 1 TABLET: 5; 325 TABLET ORAL at 01:57

## 2023-03-12 RX ADMIN — OXYCODONE HYDROCHLORIDE AND ACETAMINOPHEN 1 TABLET: 5; 325 TABLET ORAL at 20:47

## 2023-03-12 RX ADMIN — NIFEDIPINE 30 MG: 30 TABLET, FILM COATED, EXTENDED RELEASE ORAL at 08:46

## 2023-03-12 NOTE — PROGRESS NOTES
[facility] Medicine Services  PROGRESS NOTE    Patient Name: Dorothy Zavala  : 1945  MRN: [id number]    Date of Admission: 2023  Primary Care Physician: Provider, No Known    Subjective   Subjective     CC:  Follow up weakness    HPI:  Patient seen resting in bed in no apparent distress. No acute events overnight per nursing. She is aware of plan to discuss breast nodule with Dr. MARROQUIN tomorrow. No new complaints a this time. Continues to await placement.     ROS:  Gen- No fevers, chills  CV- No chest pain, palpitations  Resp- No cough, dyspnea  GI- No N/V/D, abd pain    Objective   Objective     Vital Signs:   Temp:  [97.6 °F (36.4 °C)-98.2 °F (36.8 °C)] 98.2 °F (36.8 °C)  Heart Rate:  [77-83] 77  Resp:  [18-20] 18  BP: (141-157)/() 154/75     Physical Exam:  Constitutional: No acute distress, awake, alert  HENT: NCAT, mucous membranes moist  Respiratory: Clear to auscultation bilaterally, respiratory effort normal   Cardiovascular: RRR, no murmurs, cap refill brisk   Gastrointestinal: Positive bowel sounds, soft, nontender, nondistended  Musculoskeletal: No BLE edema, irregular nonmobile right breast nodule noted   Psychiatric: Appropriate affect, cooperative  Neurologic: Oriented x 3, moves all extremities, speech clear  Skin: warm, dry, lateral erythema of right breast     Results Reviewed:   LAB RESULTS:      Lab 03/10/23  1121   WBC 9.48   HEMOGLOBIN 13.1   HEMATOCRIT 42.3   PLATELETS 269   NEUTROS ABS 7.45*   IMMATURE GRANS (ABS) 0.04   LYMPHS ABS 1.20   MONOS ABS 0.64   EOS ABS 0.11   MCV 93.8         Lab 03/10/23  1121   SODIUM 139   POTASSIUM 4.1   CHLORIDE 102   CO2 27.0   ANION GAP 10.0   BUN 14   CREATININE 0.63   EGFR 92.6   GLUCOSE 119*   CALCIUM 9.1                         Brief Urine Lab Results  (Last result in the past 365 days)      Color   Clarity   Blood   Leuk Est   Nitrite   Protein   CREAT   Urine HCG        23 1449 Yellow   Cloudy   Negative    Negative   Negative   Trace                 Microbiology Results Abnormal     Procedure Component Value - Date/Time    Fungus Culture - Cerebrospinal Fluid, Lumbar Puncture [[id number]]  (Normal) Collected: 02/27/23 1246    Lab Status: Preliminary result Specimen: Cerebrospinal Fluid from Lumbar Puncture Updated: 03/06/23 1345     Fungus Culture No fungus isolated at 1 week    Culture, CSF - Cerebrospinal Fluid, Lumbar Puncture [[id number]] Collected: 02/27/23 1246    Lab Status: Final result Specimen: Cerebrospinal Fluid from Lumbar Puncture Updated: 03/02/23 1008     CSF Culture No growth at 3 days     Gram Stain Rare (1+) WBCs seen      No organisms seen    Blood Culture - Blood, Hand, Right [[id number]]  (Normal) Collected: 02/23/23 2122    Lab Status: Final result Specimen: Blood from Hand, Right Updated: 02/28/23 2146     Blood Culture No growth at 5 days    Narrative:      Aerobic bottle only      Blood Culture - Blood, Hand, Left [[id number]]  (Normal) Collected: 02/23/23 2122    Lab Status: Final result Specimen: Blood from Hand, Left Updated: 02/28/23 2146     Blood Culture No growth at 5 days    Narrative:      Aerobic bottle only      Meningitis / Encephalitis Panel, PCR - Cerebrospinal Fluid, Lumbar Puncture [[id number]]  (Normal) Collected: 02/27/23 1246    Lab Status: Final result Specimen: Cerebrospinal Fluid from Lumbar Puncture Updated: 02/27/23 1458     ESCHERICHIA COLI K1, PCR Not Detected     HAEMOPHILUS INFLUENZAE, PCR Not Detected     LISTERIA MONOCYTOGENES, PCR Not Detected     NEISSERIA MENINGITIDIS, PCR Not Detected     STREPTOCOCCUS AGALACTIAE, PCR Not Detected     STREPTOCOCCUS PNEUMONIAE, PCR Not Detected     CYTOMEGALOVIRUS (CMV), PCR Not Detected     ENTEROVIRUS, PCR Not Detected     HERPES SIMPLEX VIRUS 1 (HSV-1), PCR Not Detected     HERPES SIMPLEX VIRUS 2 (HSV-2), PCR Not Detected     HUMAN PARECHOVIRUS, PCR Not Detected     VARICELLA ZOSTER VIRUS (VZV), PCR Not Detected     CRYPTOCOCCUS  "NEOFORMANS / GATTII, PCR Not Detected     HUMAN HERPES VIRUS 6 PCR Not Detected    Cryptococcal AG, CSF - Cerebrospinal Fluid, Lumbar Puncture [[id number]]  (Normal) Collected: 02/27/23 1246    Lab Status: Final result Specimen: Cerebrospinal Fluid from Lumbar Puncture Updated: 02/27/23 1435     Cryptococcal Antigen, CSF Negative          CT Chest With Contrast Diagnostic    Result Date: 3/11/2023  CT CHEST W CONTRAST DIAGNOSTIC Date of Exam: 3/11/2023 2:52 PM EST Indication: large \"golfball\" sized right breast nodule, concern for abscess vs mass. Comparison: Chest radiograph from February 24, 2023 Technique: Axial CT images were obtained of the chest after the uneventful intravenous administration of 75 mL Isovue-300.  Reconstructed coronal and sagittal images were also obtained. Automated exposure control and iterative construction methods were used. Findings: The central tracheobronchial tree is clear. There is some scarring at the right lung apex. There is no focal consolidation. There is no pleural effusion. The heart size appears normal. The great vessels are normal in caliber. There is no evidence of pulmonary embolus. No abnormally enlarged lymph nodes are identified. There is infiltration within the right breast. No definite drainable fluid collection is identified. Partial evaluation of the upper abdomen is unremarkable. No aggressive osseous lesions are identified. There is a mild chronic appearing compression forming at T4.     Impression: Impression: 1.Nonspecific infiltration within right breast. No definite drainable fluid collection identified. Recommend correlation with outpatient mammography workup. 2.No acute cardiopulmonary process. Electronically Signed: Nathaniel Watts  3/11/2023 5:00 PM EST  Workstation ID: [id number]      Results for orders placed during the hospital encounter of 02/23/23    Adult Transthoracic Echo Complete W/ Cont if Necessary Per Protocol (With Agitated " Saline)    Interpretation Summary  •  Left ventricular systolic function is normal. Calculated left ventricular EF = 67.2%  •  Left ventricular diastolic function was normal.  •  Saline test results are negative for right to left atrial level shunt.  •  Estimated right ventricular systolic pressure from tricuspid regurgitation is normal (<35 mmHg).      Current medications:  Scheduled Meds:aspirin, 81 mg, Oral, Daily   Or  aspirin, 300 mg, Rectal, Daily  atorvastatin, 80 mg, Oral, Nightly  levothyroxine, 100 mcg, Oral, Q AM  melatonin, 10 mg, Oral, Nightly  NIFEdipine XL, 30 mg, Oral, Q24H  nystatin, , Topical, Q12H  pantoprazole, 40 mg, Oral, Q AM  sertraline, 50 mg, Oral, Daily  sodium chloride, 10 mL, Intravenous, Q12H      Continuous Infusions:   PRN Meds:.•  hydrOXYzine  •  Magnesium Standard Dose Replacement - Initiate Nurse / BPA Driven Protocol  •  oxyCODONE-acetaminophen  •  Phosphorus Replacement - Initiate Nurse / BPA Driven Protocol  •  Potassium Replacement - Initiate Nurse / BPA Driven Protocol  •  sodium chloride  •  sodium chloride    Assessment & Plan   Assessment & Plan     Active Hospital Problems    Diagnosis  POA   • **Encephalopathy acute [G93.40]  Yes   • Suspected acute ischemic stroke  [I63.9]  Yes   • Hypertension [I10]  Yes   • Anxiety and depression [F41.9, F32.A]  Yes   • Hypothyroidism [E03.9]  Yes   • Arthritis [M19.90]  Yes   • COPD [J44.9]  Yes   • B-cell lymphoma  [C85.90]  Yes   • ? Subdural hematoma [S06.5XAA]  Yes   • Frequent falls [R29.6]  Not Applicable   • Left wrist fracture [S62.102A]  Yes   • Illicit drug use (UDS + opiates, BZDs, and TCAs) [F19.90]  Yes      Resolved Hospital Problems   No resolved problems to display.        Brief Hospital Course to date:  Dorothy Zavala is a 77 y.o. 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 Per report, history from son (who is apparently estranged from his mother) suggests that she has had declining neurologic status for some time. Patient started on antibiotics initially for empiric coverage of meningitis per neurology.  They no longer feels her symptoms are concerning for meningitis and their note states that it is okay to stop antibiotics. LP in IR completed which was negative. PT/OT recommended SNF at discharge. CM is following for placement.      This patient's problems and plans were partially entered by my partner and updated as appropriate by me 03/12/23.     Encephalopathy  - Improved. Possible alcohol/medication withdrawal  - Neurology initially concerned for chronic meningitis. LP per IR on 2/27/23 stable- culture negative and meningitis/encephalitis panel negative.  - Improving off abx, now back to baseline.   - PT/OT/SLP, referrals pending.     Depression  - Patient complaining of depression but states she no longer wants to take her Trazodone. My partner had a Long d/w her son who thinks depression is playing a large role in her decline particularly due to recent passing of her brother and recent loss of her house. He asked that we be \"tough on her\" otherwise she will not do much for herself.  - Started on Zoloft 50mg PO daily which seems to have helped some. Increase as tolerated as outpatient.     Right breast mass  -CT chest with contrast revealed nonspecific infiltration within the right breast with no definite drainable fluid collection. Recommended outpatient diagnostic mammogram  -family hx of breast CA   -Plan to discuss case with general surgeon Dr. MARROQUIN on Monday 3/13      Candidiasis   -Nystatin powder      HTN  -amlodipine d/c'd, patient felt was causing " nausea  -Started on Nifedipine 30 mg daily      CVA/TIA ruled out.  - Continue asa/statin     Hypothyroidism  - Continue synthroid     Lymphoma  - Needs oncology follow up     Wrist fracture, L  - Seen by orthopedic surgery on 2/26, Dr. Ramos-  Recommended patient return in 1 to 2 weeks to clinic for repeat radiographs.   -If still inpatient will need repeat Xrays on 3/13  -Okay for weightbearing through elbow left upper extremity, otherwise nonweightbearing left upper extremity.      Expected Discharge Location and Transportation: [facility]  Expected Discharge           Expected Discharge Date and Time      Expected Discharge Date Expected Discharge Time     Mar 13, 2023            DVT prophylaxis:  Mechanical DVT prophylaxis orders are present.     AM-PAC 6 Clicks Score (PT): 23 (03/11/23 0830)    CODE STATUS:   Code Status and Medical Interventions:   Ordered at: 02/23/23 1517     Code Status (Patient has no pulse and is not breathing):    CPR (Attempt to Resuscitate)     Medical Interventions (Patient has pulse or is breathing):    Full Support       Star Vanegas, JUAN LUIS  03/12/23

## 2023-03-12 NOTE — PLAN OF CARE
Goal Outcome Evaluation:  Plan of Care Reviewed With: patient        Progress: no change  Outcome Evaluation: A & O x 4, non tele, RA, c/o L wrist pain - see MAR, up with asst x 1, plan is for General Surgery to see tomorrow for R breast mass, Purewick in place, Alexandro 19, Falls score 18/bed alarm active

## 2023-03-13 ENCOUNTER — APPOINTMENT (OUTPATIENT)
Dept: GENERAL RADIOLOGY | Facility: HOSPITAL | Age: 78
DRG: 884 | End: 2023-03-13
Payer: MEDICARE

## 2023-03-13 PROCEDURE — 97110 THERAPEUTIC EXERCISES: CPT

## 2023-03-13 PROCEDURE — 73110 X-RAY EXAM OF WRIST: CPT

## 2023-03-13 PROCEDURE — 97116 GAIT TRAINING THERAPY: CPT

## 2023-03-13 PROCEDURE — 99232 SBSQ HOSP IP/OBS MODERATE 35: CPT | Performed by: NURSE PRACTITIONER

## 2023-03-13 RX ADMIN — Medication 10 ML: at 20:26

## 2023-03-13 RX ADMIN — LEVOTHYROXINE SODIUM 100 MCG: 0.1 TABLET ORAL at 05:59

## 2023-03-13 RX ADMIN — ASPIRIN 81 MG CHEWABLE TABLET 81 MG: 81 TABLET CHEWABLE at 08:41

## 2023-03-13 RX ADMIN — PANTOPRAZOLE SODIUM 40 MG: 40 TABLET, DELAYED RELEASE ORAL at 05:59

## 2023-03-13 RX ADMIN — OXYCODONE HYDROCHLORIDE AND ACETAMINOPHEN 1 TABLET: 5; 325 TABLET ORAL at 08:20

## 2023-03-13 RX ADMIN — HYDROXYZINE HYDROCHLORIDE 25 MG: 25 TABLET ORAL at 20:38

## 2023-03-13 RX ADMIN — OXYCODONE HYDROCHLORIDE AND ACETAMINOPHEN 1 TABLET: 5; 325 TABLET ORAL at 01:23

## 2023-03-13 RX ADMIN — Medication 10 ML: at 08:24

## 2023-03-13 RX ADMIN — OXYCODONE HYDROCHLORIDE AND ACETAMINOPHEN 1 TABLET: 5; 325 TABLET ORAL at 17:05

## 2023-03-13 RX ADMIN — OXYCODONE HYDROCHLORIDE AND ACETAMINOPHEN 1 TABLET: 5; 325 TABLET ORAL at 12:35

## 2023-03-13 RX ADMIN — NYSTATIN: 100000 POWDER TOPICAL at 08:42

## 2023-03-13 RX ADMIN — Medication 10 MG: at 20:26

## 2023-03-13 RX ADMIN — NIFEDIPINE 30 MG: 30 TABLET, FILM COATED, EXTENDED RELEASE ORAL at 08:41

## 2023-03-13 RX ADMIN — SERTRALINE HYDROCHLORIDE 50 MG: 50 TABLET ORAL at 08:42

## 2023-03-13 RX ADMIN — OXYCODONE HYDROCHLORIDE AND ACETAMINOPHEN 1 TABLET: 5; 325 TABLET ORAL at 21:09

## 2023-03-13 RX ADMIN — NYSTATIN: 100000 POWDER TOPICAL at 20:26

## 2023-03-13 RX ADMIN — ATORVASTATIN CALCIUM 80 MG: 40 TABLET, FILM COATED ORAL at 20:26

## 2023-03-13 NOTE — PROGRESS NOTES
[facility] Medicine Services  PROGRESS NOTE    Patient Name: Dorothy Zavala  : 1945  MRN: [id number]    Date of Admission: 2023  Primary Care Physician: Provider, No Known    Subjective   Subjective     CC:  Follow up weakness     HPI:  Patient is resting in bed in NAD. She states she feels well. No acute events overnight per nursing.     ROS:  Gen- No fevers, chills  CV- No chest pain, palpitations  Resp- No cough, dyspnea  GI- No N/V/D, abd pain        Objective   Objective     Vital Signs:   Temp:  [98 °F (36.7 °C)-98.6 °F (37 °C)] 98.2 °F (36.8 °C)  Heart Rate:  [77-80] 79  Resp:  [16-18] 16  BP: (131-156)/(69-89) 156/74     Physical Exam:  Constitutional: No acute distress, awake, alert  HENT: NCAT, mucous membranes moist  Respiratory: Clear to auscultation bilaterally, respiratory effort normal room air   Cardiovascular: RRR, no murmurs, rubs, or gallops  Gastrointestinal: Positive bowel sounds, soft, nontender, nondistended  Musculoskeletal: No bilateral ankle edema, irregular nonmobile right breast nodule   Psychiatric: Appropriate affect, cooperative  Neurologic: Oriented x 3, strength symmetric in all extremities, Cranial Nerves grossly intact to confrontation, speech clear  Skin: No rashes, pale       Results Reviewed:  LAB RESULTS:      Lab 03/10/23  1121   WBC 9.48   HEMOGLOBIN 13.1   HEMATOCRIT 42.3   PLATELETS 269   NEUTROS ABS 7.45*   IMMATURE GRANS (ABS) 0.04   LYMPHS ABS 1.20   MONOS ABS 0.64   EOS ABS 0.11   MCV 93.8         Lab 03/10/23  1121   SODIUM 139   POTASSIUM 4.1   CHLORIDE 102   CO2 27.0   ANION GAP 10.0   BUN 14   CREATININE 0.63   EGFR 92.6   GLUCOSE 119*   CALCIUM 9.1                         Brief Urine Lab Results  (Last result in the past 365 days)      Color   Clarity   Blood   Leuk Est   Nitrite   Protein   CREAT   Urine HCG        23 1449 Yellow   Cloudy   Negative   Negative   Negative   Trace                 Microbiology Results  Abnormal     Procedure Component Value - Date/Time    Fungus Culture - Cerebrospinal Fluid, Lumbar Puncture [[id number]]  (Normal) Collected: 02/27/23 1246    Lab Status: Preliminary result Specimen: Cerebrospinal Fluid from Lumbar Puncture Updated: 03/06/23 1345     Fungus Culture No fungus isolated at 1 week    Culture, CSF - Cerebrospinal Fluid, Lumbar Puncture [[id number]] Collected: 02/27/23 1246    Lab Status: Final result Specimen: Cerebrospinal Fluid from Lumbar Puncture Updated: 03/02/23 1008     CSF Culture No growth at 3 days     Gram Stain Rare (1+) WBCs seen      No organisms seen    Blood Culture - Blood, Hand, Right [[id number]]  (Normal) Collected: 02/23/23 2122    Lab Status: Final result Specimen: Blood from Hand, Right Updated: 02/28/23 2146     Blood Culture No growth at 5 days    Narrative:      Aerobic bottle only      Blood Culture - Blood, Hand, Left [[id number]]  (Normal) Collected: 02/23/23 2122    Lab Status: Final result Specimen: Blood from Hand, Left Updated: 02/28/23 2146     Blood Culture No growth at 5 days    Narrative:      Aerobic bottle only      Meningitis / Encephalitis Panel, PCR - Cerebrospinal Fluid, Lumbar Puncture [[id number]]  (Normal) Collected: 02/27/23 1246    Lab Status: Final result Specimen: Cerebrospinal Fluid from Lumbar Puncture Updated: 02/27/23 1458     ESCHERICHIA COLI K1, PCR Not Detected     HAEMOPHILUS INFLUENZAE, PCR Not Detected     LISTERIA MONOCYTOGENES, PCR Not Detected     NEISSERIA MENINGITIDIS, PCR Not Detected     STREPTOCOCCUS AGALACTIAE, PCR Not Detected     STREPTOCOCCUS PNEUMONIAE, PCR Not Detected     CYTOMEGALOVIRUS (CMV), PCR Not Detected     ENTEROVIRUS, PCR Not Detected     HERPES SIMPLEX VIRUS 1 (HSV-1), PCR Not Detected     HERPES SIMPLEX VIRUS 2 (HSV-2), PCR Not Detected     HUMAN PARECHOVIRUS, PCR Not Detected     VARICELLA ZOSTER VIRUS (VZV), PCR Not Detected     CRYPTOCOCCUS NEOFORMANS / GATTII, PCR Not Detected     HUMAN HERPES VIRUS 6  "PCR Not Detected    Cryptococcal AG, CSF - Cerebrospinal Fluid, Lumbar Puncture [[id number]]  (Normal) Collected: 02/27/23 1246    Lab Status: Final result Specimen: Cerebrospinal Fluid from Lumbar Puncture Updated: 02/27/23 1435     Cryptococcal Antigen, CSF Negative          XR Wrist 3+ View Left    Result Date: 3/13/2023  XR WRIST 3+ VW LEFT Date of Exam: 3/13/2023 8:41 AM EDT Indication: repeat xrays to check alignment. Comparison: 2/25/2023 Findings: Again seen is fracture of the distal radius with minimally increased impaction compared to prior examination. There is evidence of bony bridging and callus formation though fracture line is still partially visible. Carpal alignment is unchanged joint space appears grossly preserved..     Impression: Impression: Healing distal radius fracture with minimally increased impaction compared to prior examination. Electronically Signed: Ang Dorman  3/13/2023 9:20 AM EDT  Workstation ID: [id number]    CT Chest With Contrast Diagnostic    Result Date: 3/11/2023  CT CHEST W CONTRAST DIAGNOSTIC Date of Exam: 3/11/2023 2:52 PM EST Indication: large \"golfball\" sized right breast nodule, concern for abscess vs mass. Comparison: Chest radiograph from February 24, 2023 Technique: Axial CT images were obtained of the chest after the uneventful intravenous administration of 75 mL Isovue-300.  Reconstructed coronal and sagittal images were also obtained. Automated exposure control and iterative construction methods were used. Findings: The central tracheobronchial tree is clear. There is some scarring at the right lung apex. There is no focal consolidation. There is no pleural effusion. The heart size appears normal. The great vessels are normal in caliber. There is no evidence of pulmonary embolus. No abnormally enlarged lymph nodes are identified. There is infiltration within the right breast. No definite drainable fluid collection is identified. Partial evaluation of the upper " abdomen is unremarkable. No aggressive osseous lesions are identified. There is a mild chronic appearing compression forming at T4.     Impression: Impression: 1.Nonspecific infiltration within right breast. No definite drainable fluid collection identified. Recommend correlation with outpatient mammography workup. 2.No acute cardiopulmonary process. Electronically Signed: Nathaniel Watts  3/11/2023 5:00 PM EST  Workstation ID: [id number]      Results for orders placed during the hospital encounter of 02/23/23    Adult Transthoracic Echo Complete W/ Cont if Necessary Per Protocol (With Agitated Saline)    Interpretation Summary  •  Left ventricular systolic function is normal. Calculated left ventricular EF = 67.2%  •  Left ventricular diastolic function was normal.  •  Saline test results are negative for right to left atrial level shunt.  •  Estimated right ventricular systolic pressure from tricuspid regurgitation is normal (<35 mmHg).      Current medications:  Scheduled Meds:aspirin, 81 mg, Oral, Daily   Or  aspirin, 300 mg, Rectal, Daily  atorvastatin, 80 mg, Oral, Nightly  levothyroxine, 100 mcg, Oral, Q AM  melatonin, 10 mg, Oral, Nightly  NIFEdipine XL, 30 mg, Oral, Q24H  nystatin, , Topical, Q12H  pantoprazole, 40 mg, Oral, Q AM  sertraline, 50 mg, Oral, Daily  sodium chloride, 10 mL, Intravenous, Q12H      Continuous Infusions:   PRN Meds:.•  hydrOXYzine  •  Magnesium Standard Dose Replacement - Initiate Nurse / BPA Driven Protocol  •  oxyCODONE-acetaminophen  •  Phosphorus Replacement - Initiate Nurse / BPA Driven Protocol  •  Potassium Replacement - Initiate Nurse / BPA Driven Protocol  •  sodium chloride  •  sodium chloride    Assessment & Plan   Assessment & Plan     Active Hospital Problems    Diagnosis  POA   • **Encephalopathy acute [G93.40]  Yes   • Suspected acute ischemic stroke  [I63.9]  Yes   • Hypertension [I10]  Yes   • Anxiety and depression [F41.9, F32.A]  Yes   • Hypothyroidism [E03.9]  Yes    • Arthritis [M19.90]  Yes   • COPD [J44.9]  Yes   • B-cell lymphoma  [C85.90]  Yes   • ? Subdural hematoma [S06.5XAA]  Yes   • Frequent falls [R29.6]  Not Applicable   • Left wrist fracture [S62.102A]  Yes   • Illicit drug use (UDS + opiates, BZDs, and TCAs) [F19.90]  Yes      Resolved Hospital Problems   No resolved problems to display.        Brief Hospital Course to date:  Dorothy Zavala is a 77 y.o. 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 Dukes in [facility]), current falls with numerous fractures, who presented to [facility] on 2/23/2023 after being  transferred from [facility] for evaluation of stroke.Stroke imaging was negative. Per report, history from son (who is apparently estranged from his mother) suggests that she has had declining neurologic status for some time. Patient started on antibiotics initially for empiric coverage of meningitis per neurology.  They no longer feels her symptoms are concerning for meningitis and their note states that it is okay to stop antibiotics. LP in IR completed which was negative. PT/OT recommended SNF at discharge. CM is following for placement.      This patient's problems and plans were partially entered by my partner and updated as appropriate by me 03/13/23.     Encephalopathy  - Improved. Possible alcohol/medication withdrawal  - Neurology initially concerned for chronic meningitis. LP per IR on 2/27/23 stable- culture negative and meningitis/encephalitis panel negative.  - Improving off abx, now back to baseline.   - PT/OT/SLP, referrals pending.     Depression  - Patient complaining of depression but states she no longer wants to take her Trazodone. My partner had a Long d/w her son who thinks depression is playing a large role in her decline particularly due to recent passing of her  "brother and recent loss of her house. He asked that we be \"tough on her\" otherwise she will not do much for herself.  - Started on Zoloft 50mg PO daily which seems to have helped some. Increase as tolerated as outpatient.     Right breast mass  -CT chest with contrast revealed nonspecific infiltration within the right breast with no definite drainable fluid collection. Recommended outpatient diagnostic mammogram  -family hx of breast CA   -- discussed with Dr. MARROQUIN today and he recommends mesfin diagnostic mammogram and US of right breast as outpatient. He did not recommend any surgical intervention at this time. Will need to be scheduled  as outpatient      Candidiasis   -Nystatin powder      HTN  -amlodipine d/c'd, patient felt was causing nausea  -Started on Nifedipine 30 mg daily      CVA/TIA ruled out.  - Continue asa/statin     Hypothyroidism  - Continue synthroid     Lymphoma  - Needs oncology follow up     Wrist fracture, L  - Seen by orthopedic surgery on 2/26, Dr. Ramos-  Recommended patient return in 1 to 2 weeks to clinic for repeat radiographs.   -still inpatient so repeat Xray was completed on 3/13 showed healing distal radius fracture with minimally increased impaction . I messaged and texted Dr. Ramos that it was completed   -Okay for weightbearing through elbow left upper extremity, otherwise nonweightbearing left upper extremity.       Expected Discharge Location and Transportation: LTC  Expected Discharge 3/15  Expected Discharge Date and Time     Expected Discharge Date Expected Discharge Time    Mar 15, 2023            DVT prophylaxis:  Mechanical DVT prophylaxis orders are present.     AM-PAC 6 Clicks Score (PT): 20 (03/13/23 2953)    CODE STATUS:   Code Status and Medical Interventions:   Ordered at: 02/23/23 9842     Code Status (Patient has no pulse and is not breathing):    CPR (Attempt to Resuscitate)     Medical Interventions (Patient has pulse or is breathing):    Full Support "       Carlene No, APRN  03/13/23

## 2023-03-13 NOTE — SIGNIFICANT NOTE
03/13/23 1635   SLP Deferred Reason   SLP Deferred Reason Routine  (Attempted to see pt for tx this PM, pt recieving care from other providers @ time of attempt. SLP will f/u as appropriate)

## 2023-03-13 NOTE — PLAN OF CARE
Goal Outcome Evaluation:  Plan of Care Reviewed With: patient        Progress: improving  Outcome Evaluation: Pt presenting deficits in functional mobility, balance.  Ambulated 140', CGA, no AD. pt wanted to practice without cane, but would benefit from cane for safety.  Current cock up splint seems to be stabilizing well.  Recommend SNF

## 2023-03-13 NOTE — THERAPY TREATMENT NOTE
Patient Name: Dorothy Zavala  : 1945    MRN: [id number]                              Today's Date: 3/13/2023       Admit Date: 2023    Visit Dx:     ICD-10-CM ICD-9-CM   1. Cognitive communication deficit  R41.841 799.52   2. Dysphagia, unspecified type  R13.10 787.20     Patient Active Problem List   Diagnosis   • Suspected acute ischemic stroke    • Hypertension   • Anxiety and depression   • Hypothyroidism   • Arthritis   • COPD   • B-cell lymphoma    • ? Subdural hematoma   • Frequent falls   • Left wrist fracture   • Illicit drug use (UDS + opiates, BZDs, and TCAs)   • Encephalopathy acute     Past Medical History:   Diagnosis Date   • Anxiety and depression 2023   • Arthritis 2023   • B-cell lymphoma  2023   • COPD 2023   • Hypertension 2023   • Hypothyroidism 2023     History reviewed. No pertinent surgical history.   General Information     Row Name 23 1336          Physical Therapy Time and Intention    Document Type therapy note (daily note)  -KG     Mode of Treatment physical therapy  -KG     Row Name 23 1336          General Information    Patient Profile Reviewed yes  -KG     Existing Precautions/Restrictions fall;left;non-weight bearing;other (see comments)  NWB L UE in wrist splint AAT  -KG     Row Name 23 133          Cognition    Orientation Status (Cognition) oriented x 3  -KG     Row Name 23 1336          Safety Issues, Functional Mobility    Impairments Affecting Function (Mobility) balance;endurance/activity tolerance;strength  -KG           User Key  (r) = Recorded By, (t) = Taken By, (c) = Cosigned By    Initials Name Provider Type    KG Florence Pickett Physical Therapist               Mobility     Row Name 23 1336          Bed Mobility    Rolling Left [place] (Bed Mobility) verbal cues;1 person assist;supervision  -KG     Scooting/Bridging [place] (Bed Mobility) supervision;1 person assist  -KG     Supine-Sit  [place] (Bed Mobility) supervision  -KG     Sit-Supine [place] (Bed Mobility) supervision  -KG     Assistive Device (Bed Mobility) head of bed elevated  -KG     Row Name 03/13/23 1336          Transfers    Comment, (Transfers) CGA  -KG     Row Name 03/13/23 1336          Sit-Stand Transfer    Sit-Stand [place] (Transfers) verbal cues;contact guard;1 person assist  -KG     Assistive Device (Sit-Stand Transfers) other (see comments)  no AD  -KG     Row Name 03/13/23 1336          Gait/Stairs (Locomotion)    [place] Level (Gait) verbal cues;contact guard;1 person assist  -KG     Assistive Device (Gait) other (see comments)  no AD  -KG     Distance in Feet (Gait) 140  -KG     Deviations/Abnormal Patterns (Gait) [place] decreased;stride length decreased;gait speed decreased  -KG     Bilateral Gait Deviations forward flexed posture  -KG     Row Name 03/13/23 1336          Mobility    Extremity Weight-bearing Status left upper extremity   -KG     Left Upper Extremity (Weight-bearing Status) non weight-bearing (NWB)   -KG           User Key  (r) = Recorded By, (t) = Taken By, (c) = Cosigned By    Initials Name Provider Type    Florence Lara Physical Therapist               Obj/Interventions     Row Name 03/13/23 1339          Motor Skills    Therapeutic Exercise other (see comments)  heel raises, STS, marching x10  -KG     Row Name 03/13/23 1339          Balance    Dynamic Standing Balance contact guard  -KG     Position/Device Used, Standing Balance unsupported  -KG     Balance Interventions standing;sit to stand  -KG           User Key  (r) = Recorded By, (t) = Taken By, (c) = Cosigned By    Initials Name Provider Type    Florence Lara Physical Therapist               Goals/Plan    No documentation.                Clinical Impression     Row Name 03/13/23 1340          Pain    Pretreatment Pain Rating 0/10 - no pain  -KG     Posttreatment Pain Rating 0/10 - no pain  -KG     Row Name  03/13/23 1340          Plan of Care Review    Plan of Care Reviewed With patient  -KG     Progress improving  -KG     Outcome Evaluation Pt presenting deficits in functional mobility, balance.  Ambulated 140', CGA, no AD. pt wanted to practice without cane, but would benefit from cane for safety.  Current cock up splint seems to be stabilizing well.  Recommend SNF  -KG     Row Name 03/13/23 1340          Positioning and Restraints    Pre-Treatment Position in bed  -KG     Post Treatment Position bed  -KG     In Bed notified nsg;fowlers;call light within reach;encouraged to call for assist;exit alarm on  -KG           User Key  (r) = Recorded By, (t) = Taken By, (c) = Cosigned By    Initials Name Provider Type    Florence Lara Physical Therapist               Outcome Measures     Row Name 03/13/23 1343          How much help from another person do you currently need...    Turning from your back to your side while in flat bed without using bedrails? 4  -KG     Moving from lying on back to sitting on the side of a flat bed without bedrails? 4  -KG     Moving to and from a bed to a chair (including a wheelchair)? 3  -KG     Standing up from a chair using your arms (e.g., wheelchair, bedside chair)? 3  -KG     Climbing 3-5 steps with a railing? 3  -KG     To walk in hospital room? 3  -KG     AM-PAC 6 Clicks Score (PT) 20  -KG     Highest level of mobility 6 --> Walked 10 steps or more  -KG     Row Name 03/13/23 1343          Functional Assessment    Outcome Measure Options AM-PAC 6 Clicks Basic Mobility (PT)  -KG           User Key  (r) = Recorded By, (t) = Taken By, (c) = Cosigned By    Initials Name Provider Type    Florence Lara Physical Therapist                             Physical Therapy Education     Title: PT OT SLP Therapies (Done)     Topic: Physical Therapy (Done)     Point: Mobility training (Done)     Learning Progress Summary           Patient Acceptance, E, NR,VU by BLU at 3/13/2023 1343     Acceptance, E, NR by AS at 3/9/2023 1349    Acceptance, E,D, VU,DU by AB at 3/8/2023 1331    Acceptance, E, VU,NR by LO at 3/7/2023 1319    Comment: PT POC    Acceptance, E,TB, VU by KW at 3/6/2023 1418    Comment: cotninued benefit of skilled PT services    Acceptance, E, VU,NR by KG1 at 3/3/2023 0846    Acceptance, E, VU by SJ at 3/1/2023 1623    Acceptance, E, NR by AS at 2/27/2023 0958    Acceptance, E, VU by CM at 2/24/2023 1449   Family Acceptance, E, VU by CM at 2/24/2023 1449                   Point: Home exercise program (Done)     Learning Progress Summary           Patient Acceptance, E, NR,VU by KG at 3/13/2023 1343    Acceptance, E, NR by AS at 3/9/2023 1349    Acceptance, E, VU,NR by LO at 3/7/2023 1319    Comment: PT POC    Acceptance, E,TB, VU by KW at 3/6/2023 1418    Comment: cotninued benefit of skilled PT services    Acceptance, E, VU,NR by KG1 at 3/3/2023 0846    Acceptance, E, VU by SJ at 3/1/2023 1623    Acceptance, E, NR by AS at 2/27/2023 0958                   Point: Body mechanics (Done)     Learning Progress Summary           Patient Acceptance, E, NR,VU by KG at 3/13/2023 1343    Acceptance, E, NR by AS at 3/9/2023 1349    Acceptance, E,D, VU,DU by AB at 3/8/2023 1331    Acceptance, E, VU,NR by LO at 3/7/2023 1319    Comment: PT POC    Acceptance, E,TB, VU by KW at 3/6/2023 1418    Comment: cotninued benefit of skilled PT services    Acceptance, E, VU,NR by KG1 at 3/3/2023 0846    Acceptance, E, VU by SJ at 3/1/2023 1623    Acceptance, E, NR by AS at 2/27/2023 0958                   Point: Precautions (Done)     Learning Progress Summary           Patient Acceptance, E, NR,VU by KG at 3/13/2023 1343    Acceptance, E, NR by AS at 3/9/2023 1349    Acceptance, E,D, VU,DU by AB at 3/8/2023 1331    Acceptance, E, VU,NR by LO at 3/7/2023 1319    Comment: PT POC    Acceptance, E,TB, VU by KW at 3/6/2023 1418    Comment: cotninued benefit of skilled PT services    Acceptance, E, VU,NR by KG1 at  3/3/2023 0846    Acceptance, E, VU by SJ at 3/1/2023 1623    Acceptance, E, NR by AS at 2/27/2023 0958    Acceptance, E, VU by CM at 2/24/2023 1449   Family Acceptance, E, VU by CM at 2/24/2023 1449                               User Key     Initials Effective Dates Name Provider Type Discipline    SJ 02/03/23 - 03/12/23 Alyssia Saini, PT Physical Therapist PT    AS 02/03/23 -  Danisha Rai, PTA Physical Therapist Assistant PT    KG1 05/22/20 -  Shanelle Roach, PT Physical Therapist PT    KG 01/04/23 -  Florence Pickett Physical Therapist PT    LO 06/16/21 -  Lynn Ferraro, PT Physical Therapist PT    KW 01/27/22 -  Hailey Zimmerman, PT Physical Therapist PT    AB 09/22/22 -  Danisha Mello, PT Physical Therapist PT    CM 09/22/22 -  Kiana Davis, PT Physical Therapist PT              PT Recommendation and Plan     Plan of Care Reviewed With: patient  Progress: improving  Outcome Evaluation: Pt presenting deficits in functional mobility, balance.  Ambulated 140', CGA, no AD. pt wanted to practice without cane, but would benefit from cane for safety.  Current cock up splint seems to be stabilizing well.  Recommend SNF     Time Calculation:    PT Charges     Row Name 03/13/23 1344             Time Calculation    Start Time 1300  -KG      PT Received On 03/13/23  -KG      PT Goal Re-Cert Due Date 03/16/23  -KG         Time Calculation- PT    Total Timed Code Minutes- PT 25 minute(s)  -KG         Timed Charges    65511 - PT Therapeutic Exercise Minutes 10  -KG      15073 - Gait Training Minutes  15  -KG         Total Minutes    Timed Charges Total Minutes 25  -KG       Total Minutes 25  -KG            User Key  (r) = Recorded By, (t) = Taken By, (c) = Cosigned By    Initials Name Provider Type    KG Florence Pickett Physical Therapist              Therapy Charges for Today     Code Description Service Date Service Provider Modifiers Qty    43540224402 HC PT THER PROC EA 15 MIN 3/13/2023  Florence Pickett GP 1    02212003372 HC GAIT TRAINING EA 15 MIN 3/13/2023 Florence Pickett GP 1          PT G-Codes  Outcome Measure Options: AM-PAC 6 Clicks Basic Mobility (PT)  AM-PAC 6 Clicks Score (PT): 20  AM-PAC 6 Clicks Score (OT): 18  Modified [place] Scale: 4 - Moderately severe disability.  Unable to walk without assistance, and unable to attend to own bodily needs without assistance.       Florence Pickett  3/13/2023

## 2023-03-13 NOTE — PLAN OF CARE
Problem: Pain Acute  Goal: Acceptable Pain Control and Functional Ability  Intervention: Prevent or Manage Pain  Recent Flowsheet Documentation  Taken 3/13/2023 0600 by Ela Billings RN  Medication Review/Management: medications reviewed  Taken 3/13/2023 0400 by Ela Billings RN  Medication Review/Management: medications reviewed  Taken 3/13/2023 0123 by Ela Billings RN  Medication Review/Management: medications reviewed  Taken 3/13/2023 0000 by Ela Billings RN  Sensory Stimulation Regulation: care clustered  Medication Review/Management: medications reviewed   Goal Outcome Evaluation:      Pt is on 18th day of admission with VSS and some c/o wrist pain requiring PRN pain medication. She was transferred form [facility] at [place].

## 2023-03-13 NOTE — CASE MANAGEMENT/SOCIAL WORK
Continued Stay Note  [facility]     Patient Name: Dorothy Zavala  MRN: [id number]  Today's Date: 3/13/2023    Admit Date: 2/23/2023    Plan: [facility] SNF, pending insurance auth   Discharge Plan     Row Name 03/13/23 1531       Plan    Plan Comments MSW followed up with Tianna at [facility]. Tianna explained that they need updated notes from last few days. MSW faxed updated notes to [facility]. MSW continues to follow.               Discharge Codes    No documentation.               Expected Discharge Date and Time     Expected Discharge Date Expected Discharge Time    Mar 15, 2023             GARDENIA [place]

## 2023-03-13 NOTE — PLAN OF CARE
Goal Outcome Evaluation:           Progress: no change  Outcome Evaluation: Pt transferred to floor last night d/t right breast mass finding. VSS today. Patient had adequate urine output and a BM today. PT walked with patient. Repeat xray on wrist shows improvement in fracture, OT will work with tomorrow. Bath given. Pain controlled with PRN percocet. Plan for SNF. Will continue to monitor.

## 2023-03-14 PROCEDURE — 97530 THERAPEUTIC ACTIVITIES: CPT | Performed by: OCCUPATIONAL THERAPIST

## 2023-03-14 PROCEDURE — 97535 SELF CARE MNGMENT TRAINING: CPT | Performed by: OCCUPATIONAL THERAPIST

## 2023-03-14 PROCEDURE — 97110 THERAPEUTIC EXERCISES: CPT

## 2023-03-14 PROCEDURE — 97116 GAIT TRAINING THERAPY: CPT

## 2023-03-14 PROCEDURE — 99232 SBSQ HOSP IP/OBS MODERATE 35: CPT | Performed by: NURSE PRACTITIONER

## 2023-03-14 RX ORDER — HYDROXYZINE HYDROCHLORIDE 25 MG/1
25 TABLET, FILM COATED ORAL 2 TIMES DAILY PRN
Status: DISCONTINUED | OUTPATIENT
Start: 2023-03-14 | End: 2023-03-17 | Stop reason: HOSPADM

## 2023-03-14 RX ADMIN — OXYCODONE HYDROCHLORIDE AND ACETAMINOPHEN 1 TABLET: 5; 325 TABLET ORAL at 21:12

## 2023-03-14 RX ADMIN — Medication 10 ML: at 08:26

## 2023-03-14 RX ADMIN — NIFEDIPINE 30 MG: 30 TABLET, FILM COATED, EXTENDED RELEASE ORAL at 08:25

## 2023-03-14 RX ADMIN — OXYCODONE HYDROCHLORIDE AND ACETAMINOPHEN 1 TABLET: 5; 325 TABLET ORAL at 17:25

## 2023-03-14 RX ADMIN — Medication 10 ML: at 21:13

## 2023-03-14 RX ADMIN — NYSTATIN: 100000 POWDER TOPICAL at 08:25

## 2023-03-14 RX ADMIN — SERTRALINE HYDROCHLORIDE 50 MG: 50 TABLET ORAL at 08:25

## 2023-03-14 RX ADMIN — ATORVASTATIN CALCIUM 80 MG: 40 TABLET, FILM COATED ORAL at 21:12

## 2023-03-14 RX ADMIN — PANTOPRAZOLE SODIUM 40 MG: 40 TABLET, DELAYED RELEASE ORAL at 05:40

## 2023-03-14 RX ADMIN — HYDROXYZINE HYDROCHLORIDE 25 MG: 25 TABLET ORAL at 15:17

## 2023-03-14 RX ADMIN — OXYCODONE HYDROCHLORIDE AND ACETAMINOPHEN 1 TABLET: 5; 325 TABLET ORAL at 02:33

## 2023-03-14 RX ADMIN — NYSTATIN: 100000 POWDER TOPICAL at 20:30

## 2023-03-14 RX ADMIN — OXYCODONE HYDROCHLORIDE AND ACETAMINOPHEN 1 TABLET: 5; 325 TABLET ORAL at 13:10

## 2023-03-14 RX ADMIN — Medication 10 MG: at 21:17

## 2023-03-14 RX ADMIN — OXYCODONE HYDROCHLORIDE AND ACETAMINOPHEN 1 TABLET: 5; 325 TABLET ORAL at 08:25

## 2023-03-14 RX ADMIN — LEVOTHYROXINE SODIUM 100 MCG: 0.1 TABLET ORAL at 05:40

## 2023-03-14 RX ADMIN — ASPIRIN 81 MG CHEWABLE TABLET 81 MG: 81 TABLET CHEWABLE at 08:25

## 2023-03-14 NOTE — THERAPY TREATMENT NOTE
Patient Name: Dorothy Zavala  : 1945    MRN: [id number]                              Today's Date: 3/14/2023       Admit Date: 2023    Visit Dx:     ICD-10-CM ICD-9-CM   1. Cognitive communication deficit  R41.841 799.52   2. Dysphagia, unspecified type  R13.10 787.20     Patient Active Problem List   Diagnosis   • Suspected acute ischemic stroke    • Hypertension   • Anxiety and depression   • Hypothyroidism   • Arthritis   • COPD   • B-cell lymphoma    • ? Subdural hematoma   • Frequent falls   • Left wrist fracture   • Illicit drug use (UDS + opiates, BZDs, and TCAs)   • Encephalopathy acute     Past Medical History:   Diagnosis Date   • Anxiety and depression 2023   • Arthritis 2023   • B-cell lymphoma  2023   • COPD 2023   • Hypertension 2023   • Hypothyroidism 2023     History reviewed. No pertinent surgical history.   General Information     Row Name 23 0858          OT Time and Intention    Document Type therapy note (daily note)  -SD     Mode of Treatment occupational therapy  -SD     Row Name 23 0858          General Information    Patient Profile Reviewed yes  -SD     Existing Precautions/Restrictions fall;left;non-weight bearing;other (see comments)  L UE NWB, except may bear wt. thru elbow; in wrist splint AAT  -SD     Barriers to Rehab medically complex  -SD     Row Name 23 0858          Cognition    Orientation Status (Cognition) oriented x 3  -SD     Row Name 23 0858          Safety Issues, Functional Mobility    Safety Issues Affecting Function (Mobility) awareness of need for assistance;insight into deficits/self-awareness;safety precaution awareness;safety precautions follow-through/compliance  -SD     Impairments Affecting Function (Mobility) balance;strength;shortness of breath;endurance/activity tolerance;pain  -SD     Cognitive Impairments, Mobility Safety/Performance insight into deficits/self-awareness;safety precaution  awareness;safety precaution follow-through  -SD           User Key  (r) = Recorded By, (t) = Taken By, (c) = Cosigned By    Initials Name Provider Type    Marleni Sargent OT Occupational Therapist                 Mobility/ADL's     Row Name 03/14/23 0936          Bed Mobility    Bed Mobility rolling left;scooting/bridging;supine-sit  -SD     Rolling Left [place] (Bed Mobility) standby assist  -SD     Scooting/Bridging [place] (Bed Mobility) standby assist  -SD     Supine-Sit [place] (Bed Mobility) standby assist  -SD     Bed Mobility, Safety Issues decreased use of arms for pushing/pulling  -SD     Assistive Device (Bed Mobility) bed rails;head of bed elevated  -SD     Row Name 03/14/23 0936          Transfers    Transfers bed-chair transfer;sit-stand transfer;stand-sit transfer  -SD     Row Name 03/14/23 0936          Bed-Chair Transfer    Bed-Chair [place] (Transfers) contact guard;verbal cues;1 person assist  -SD     Assistive Device (Bed-Chair Transfers) other (see comments)  UE support on R side  -SD     Row Name 03/14/23 0936          Sit-Stand Transfer    Sit-Stand [place] (Transfers) contact guard;verbal cues;1 person assist  -SD     Assistive Device (Sit-Stand Transfers) other (see comments)  UE support on R side  -SD     Row Name 03/14/23 0936          Stand-Sit Transfer    Stand-Sit [place] (Transfers) contact guard;verbal cues  -SD     Row Name 03/14/23 0936          Functional Mobility    Functional Mobility- Ind. Level verbal cues required;contact guard assist;1 person  -SD     Functional Mobility- Device other (see comments)  UE support on R side  -SD     Functional Mobility-Distance (Feet) 175  -SD     Functional Mobility- Safety Issues balance decreased during turns  -SD     Row Name 03/14/23 0936          Activities of Daily Living    BADL Assessment/Intervention lower body dressing;upper body dressing;grooming  -SD     Row Name 03/14/23 0936           Mobility    Extremity Weight-bearing Status left upper extremity  -SD     Left Upper Extremity (Weight-bearing Status) non weight-bearing (NWB)  -SD     Row Name 03/14/23 0936          Lower Body Dressing Assessment/Training    [place] Level (Lower Body Dressing) don;socks;set up  -SD     Position (Lower Body Dressing) long sitting  -SD     Row Name 03/14/23 0936          Upper Body Dressing Assessment/Training    [place] Level (Upper Body Dressing) don;pajama/robe;supervision  -SD     Position (Upper Body Dressing) sitting up in bed  -SD     Row Name 03/14/23 0936          Grooming Assessment/Training    [place] Level (Grooming) wash face, hands;independent  -SD     Position (Grooming) sitting up in bed  -SD           User Key  (r) = Recorded By, (t) = Taken By, (c) = Cosigned By    Initials Name Provider Type    Marleni Sargent OT Occupational Therapist               Obj/Interventions     Row Name 03/14/23 0939          Balance    Static Sitting Balance independent  -SD     Dynamic Sitting Balance independent  -SD     Position, Sitting Balance unsupported;sitting edge of bed  -SD     Sit to Stand Dynamic Balance contact guard;verbal cues  -SD     Static Standing Balance contact guard;1-person assist  -SD     Dynamic Standing Balance contact guard;1-person assist  -SD     Position/Device Used, Standing Balance supported;other (see comments)  R UE support  -SD           User Key  (r) = Recorded By, (t) = Taken By, (c) = Cosigned By    Initials Name Provider Type    Marleni Sargent OT Occupational Therapist               Goals/Plan    No documentation.                Clinical Impression     Row Name 03/14/23 0940          Pain Assessment    Pretreatment Pain Rating 6/10  -SD     Posttreatment Pain Rating 6/10  -SD     Pain Location - Side/Orientation Left  -SD     Pain Location - wrist  -SD     Pain Intervention(s) Ambulation/increased activity;Elevated;Emotional support;Nursing  Notified;Repositioned;Splinting;Other (Comment)  fitted pt. with stockinette sleeve under L wrist cock up splint; RN present & educated on adjustment to splint  -SD     Row Name 03/14/23 0940          Plan of Care Review    Plan of Care Reviewed With patient  -SD     Progress improving  -SD     Outcome Evaluation Pt. reporting increased comfort @splint site with introduction of stockinette sleeve. Pt. with increasing ADL independence, donning socks independently & robe. Pt. reported dizziness & fatigue with ambulation in hallway. Will cont to progress pt as able per POC. Recommend SNF upon d/c.  -SD     Row Name 03/14/23 0940          Therapy Assessment/Plan (OT)    Rehab Potential (OT) good, to achieve stated therapy goals  -SD     Criteria for Skilled Therapeutic Interventions Met (OT) yes;skilled treatment is necessary;meets criteria  -SD     Therapy Frequency (OT) daily  -SD     Row Name 03/14/23 0940          Therapy Plan Review/Discharge Plan (OT)    Anticipated Discharge Disposition (OT) skilled nursing facility  -SD     Row Name 03/14/23 0940          Vital Signs    O2 Delivery Pre Treatment room air  -SD     O2 Delivery Intra Treatment room air  -SD     O2 Delivery Post Treatment room air  -SD     Pre Patient Position Supine  -SD     Intra Patient Position Standing  -SD     Post Patient Position Sitting  -SD     Row Name 03/14/23 0940          Positioning and Restraints    Pre-Treatment Position in bed  -SD     Post Treatment Position chair  -SD     In Chair notified nsg;reclined;call light within reach;encouraged to call for assist;legs elevated;LUE elevated  -SD           User Key  (r) = Recorded By, (t) = Taken By, (c) = Cosigned By    Initials Name Provider Type    Marleni Sargent, OT Occupational Therapist               Outcome Measures     Row Name 03/14/23 0852          How much help from another is currently needed...    Putting on and taking off regular lower body clothing? 3  -SD      Bathing (including washing, rinsing, and drying) 3  -SD     Toileting (which includes using toilet bed pan or urinal) 3  -SD     Putting on and taking off regular upper body clothing 3  -SD     Taking care of personal grooming (such as brushing teeth) 4  -SD     Eating meals 4  -SD     AM-PAC 6 Clicks Score (OT) 20  -SD     Row Name 03/14/23 0858          Functional Assessment    Outcome Measure Options AM-PAC 6 Clicks Daily Activity (OT)  -SD           User Key  (r) = Recorded By, (t) = Taken By, (c) = Cosigned By    Initials Name Provider Type    Marleni Sargent OT Occupational Therapist                Occupational Therapy Education     Title: PT OT SLP Therapies (Done)     Topic: Occupational Therapy (Done)     Point: ADL training (Done)     Description:   Instruct learner(s) on proper safety adaptation and remediation techniques during self care or transfers.   Instruct in proper use of assistive devices.              Learning Progress Summary           Patient Acceptance, E, VU by AN at 3/8/2023 1511    Acceptance, E, VU by MR at 3/7/2023 1315    Acceptance, E, VU by AN at 3/1/2023 1455    Acceptance, E, VU by AN at 2/28/2023 1025    Acceptance, E, NR by  at 2/24/2023 1425                   Point: Home exercise program (Done)     Description:   Instruct learner(s) on appropriate technique for monitoring, assisting and/or progressing therapeutic exercises/activities.              Learning Progress Summary           Patient Acceptance, E, VU by AN at 3/8/2023 1511    Acceptance, E, VU by MR at 3/7/2023 1315                   Point: Precautions (Done)     Description:   Instruct learner(s) on prescribed precautions during self-care and functional transfers.              Learning Progress Summary           Patient Acceptance, E, VU by AN at 3/8/2023 1511    Acceptance, E, VU by MR at 3/7/2023 1315    Acceptance, E, VU by AN at 3/1/2023 1455    Acceptance, E, VU by AN at 2/28/2023 1025    Acceptance, E,  NR by  at 2/24/2023 1425                   Point: Body mechanics (Done)     Description:   Instruct learner(s) on proper positioning and spine alignment during self-care, functional mobility activities and/or exercises.              Learning Progress Summary           Patient Acceptance, E, VU by AN at 3/8/2023 1511    Acceptance, E, VU by MR at 3/7/2023 1315    Acceptance, E, VU by AN at 3/1/2023 1455    Acceptance, E, VU by AN at 2/28/2023 1025    Acceptance, E, NR by CS at 2/24/2023 1425                               User Key     Initials Effective Dates Name Provider Type Discipline     09/02/21 -  Maci Flores, OT Occupational Therapist OT    AN 09/21/21 -  Alejandra Jaimes, OT Occupational Therapist OT    MR 09/22/22 -  Sarah Collins, OT Occupational Therapist OT              OT Recommendation and Plan  Therapy Frequency (OT): daily  Plan of Care Review  Plan of Care Reviewed With: patient  Progress: improving  Outcome Evaluation: Pt. reporting increased comfort @splint site with introduction of stockinette sleeve. Pt. with increasing ADL independence, donning socks independently & robe. Pt. reported dizziness & fatigue with ambulation in hallway. Will cont to progress pt as able per POC. Recommend SNF upon d/c.     Time Calculation:    Time Calculation- OT     Row Name 03/14/23 0946             Time Calculation- OT    OT Received On 03/14/23  -SD      OT Goal Re-Cert Due Date 03/17/23  -SD         Timed Charges    73413 - OT Therapeutic Activity Minutes 15  -SD      62906 - OT Self Care/Mgmt Minutes 15  -SD         Total Minutes    Timed Charges Total Minutes 30  -SD       Total Minutes 30  -SD            User Key  (r) = Recorded By, (t) = Taken By, (c) = Cosigned By    Initials Name Provider Type    Marleni Sargent, OT Occupational Therapist              Therapy Charges for Today     Code Description Service Date Service Provider Modifiers Qty    03351034108  OT THERAPEUTIC ACT EA 15 MIN  3/14/2023 Marleni Chavez, OT GO 1    62134273967 HC OT SELF CARE/MGMT/TRAIN EA 15 MIN 3/14/2023 Marleni Chavez OT GO 1               Marleni Chavez, OT  3/14/2023

## 2023-03-14 NOTE — PLAN OF CARE
Goal Outcome Evaluation:           Progress: no change  Outcome Evaluation: Patient remains inpatient awaiting placement per pending insurance approval. PT/OT worked with today and patient ambulated in hallway. Pain controlled with PRN meds and positioning. VSS. Adequate urine output. Exhibiting independence. Will continue to monitor.

## 2023-03-14 NOTE — PLAN OF CARE
Goal Outcome Evaluation:  Plan of Care Reviewed With: patient        Progress: improving  Outcome Evaluation: Pt continuing to work on stability, strength and safety with functional mobility.  Worked on SPC training, 115', CGA with cues for sequencing, pt also needs cues for safety awareness and stability.  Continued recommendation SNF

## 2023-03-14 NOTE — PLAN OF CARE
Problem: Pain Acute  Goal: Acceptable Pain Control and Functional Ability  Intervention: Prevent or Manage Pain  Recent Flowsheet Documentation  Taken 3/14/2023 0600 by Ela Billings RN  Medication Review/Management: medications reviewed  Taken 3/14/2023 0400 by Ela Billings RN  Medication Review/Management: medications reviewed  Taken 3/14/2023 0233 by lEa Billings RN  Sleep/Rest Enhancement:   consistent schedule promoted   regular sleep/rest pattern promoted   relaxation techniques promoted  Medication Review/Management: medications reviewed  Taken 3/14/2023 0000 by Ela Billings RN  Medication Review/Management: medications reviewed  Taken 3/13/2023 2200 by Ela Billings RN  Medication Review/Management: medications reviewed  Taken 3/13/2023 2026 by Ela Billings RN  Sensory Stimulation Regulation: care clustered  Sleep/Rest Enhancement: consistent schedule promoted  Medication Review/Management: medications reviewed  Intervention: Develop Pain Management Plan  Recent Flowsheet Documentation  Taken 3/14/2023 0233 by Ela Billings RN  Pain Management Interventions: see MAR  Intervention: Optimize Psychosocial Wellbeing  Recent Flowsheet Documentation  Taken 3/13/2023 2026 by Ela Billings RN  Supportive Measures:   active listening utilized   positive reinforcement provided   problem-solving facilitated   relaxation techniques promoted   self-care encouraged   verbalization of feelings encouraged  Diversional Activities: television   Goal Outcome Evaluation:  Pt is on her 19th day of admission with VSS and some c/o wrist pain requiring PRN pain medication. No changes noted.

## 2023-03-14 NOTE — THERAPY TREATMENT NOTE
Patient Name: Dorothy Zavala  : 1945    MRN: [id number]                              Today's Date: 3/14/2023       Admit Date: 2023    Visit Dx:     ICD-10-CM ICD-9-CM   1. Cognitive communication deficit  R41.841 799.52   2. Dysphagia, unspecified type  R13.10 787.20     Patient Active Problem List   Diagnosis   • Suspected acute ischemic stroke    • Hypertension   • Anxiety and depression   • Hypothyroidism   • Arthritis   • COPD   • B-cell lymphoma    • ? Subdural hematoma   • Frequent falls   • Left wrist fracture   • Illicit drug use (UDS + opiates, BZDs, and TCAs)   • Encephalopathy acute     Past Medical History:   Diagnosis Date   • Anxiety and depression 2023   • Arthritis 2023   • B-cell lymphoma  2023   • COPD 2023   • Hypertension 2023   • Hypothyroidism 2023     History reviewed. No pertinent surgical history.   General Information     Row Name 23 [facility]          Physical Therapy Time and Intention    Document Type therapy note (daily note)  -KG     Mode of Treatment physical therapy  -KG     Row Name 23 143          General Information    Patient Profile Reviewed yes  -KG     Existing Precautions/Restrictions fall;left;non-weight bearing;other (see comments)  L UE NWB, except may bear wt. thru elbow; in wrist splint AAT  -KG     Row Name 23 143          Cognition    Orientation Status (Cognition) oriented x 3  -KG     Row Name 23 143          Safety Issues, Functional Mobility    Impairments Affecting Function (Mobility) balance;strength;shortness of breath;endurance/activity tolerance;pain  -KG           User Key  (r) = Recorded By, (t) = Taken By, (c) = Cosigned By    Initials Name Provider Type    KG Florence Pickett Physical Therapist               Mobility     Row Name 23          Bed Mobility    Bed Mobility rolling left;scooting/bridging;supine-sit  -KG     Rolling Left [place] (Bed Mobility) standby assist  -KG      Scooting/Bridging [place] (Bed Mobility) standby assist  -KG     Supine-Sit [place] (Bed Mobility) standby assist  -KG     Sit-Supine [place] (Bed Mobility) supervision  -KG     Assistive Device (Bed Mobility) bed rails;head of bed elevated  -KG     Row Name 03/14/23 1433          Transfers    Comment, (Transfers) CGA  -KG     Row Name 03/14/23 1433          Sit-Stand Transfer    Sit-Stand [place] (Transfers) contact guard;verbal cues;1 person assist  -KG     Assistive Device (Sit-Stand Transfers) cane, straight  -KG     Row Name 03/14/23 1433          Gait/Stairs (Locomotion)    [place] Level (Gait) verbal cues;contact guard;1 person assist  -KG     Assistive Device (Gait) cane, straight  -KG     Distance in Feet (Gait) 115  -KG     Deviations/Abnormal Patterns (Gait) [place] decreased;stride length decreased;gait speed decreased  -KG     Bilateral Gait Deviations forward flexed posture  -KG     Comment, (Gait/Stairs) Pt slowly improving with ambulation training with cane.  115', CGA with cues for sequencing, pt also needs cues for safety awareness and stability.  -KG     Row Name 03/14/23 1433          Mobility    Extremity Weight-bearing Status left upper extremity  -KG     Left Upper Extremity (Weight-bearing Status) non weight-bearing (NWB)  -KG           User Key  (r) = Recorded By, (t) = Taken By, (c) = Cosigned By    Initials Name Provider Type    Florence Lara Physical Therapist               Obj/Interventions     Row Name 03/14/23 1437          Motor Skills    Therapeutic Exercise other (see comments)  STS x10, heel raises  -KG     Row Name 03/14/23 1437          Balance    Dynamic Standing Balance contact guard  -KG     Position/Device Used, Standing Balance supported  -KG     Balance Interventions standing;sit to stand;weight shifting activity  -KG           User Key  (r) = Recorded By, (t) = Taken By, (c) = Cosigned By    Initials Name Provider Type    BLU Pickett  Florence Physical Therapist               Goals/Plan    No documentation.                Clinical Impression     Row Name 03/14/23 1438          Pain    Pretreatment Pain Rating 0/10 - no pain  -KG     Posttreatment Pain Rating 0/10 - no pain  -KG     Row Name 03/14/23 1438          Plan of Care Review    Plan of Care Reviewed With patient  -KG     Progress improving  -KG     Outcome Evaluation Pt continuing to work on stability, strength and safety with functional mobility.  Worked on SPC training, 115', CGA with cues for sequencing, pt also needs cues for safety awareness and stability.  Continued recommendation SNF  -KG     Row Name 03/14/23 1438          Positioning and Restraints    Pre-Treatment Position in bed  -KG     Post Treatment Position bed  -KG     In Bed notified nsg;fowlers;call light within reach;encouraged to call for assist;exit alarm on  -KG           User Key  (r) = Recorded By, (t) = Taken By, (c) = Cosigned By    Initials Name Provider Type    BLU Florence Pickett Physical Therapist               Outcome Measures     Row Name 03/14/23 1440 03/14/23 0800       How much help from another person do you currently need...    Turning from your back to your side while in flat bed without using bedrails? 4  -KG 3  -DD    Moving from lying on back to sitting on the side of a flat bed without bedrails? 3  -KG 3  -DD    Moving to and from a bed to a chair (including a wheelchair)? 3  -KG 3  -DD    Standing up from a chair using your arms (e.g., wheelchair, bedside chair)? 3  -KG 3  -DD    Climbing 3-5 steps with a railing? 3  -KG 3  -DD    To walk in hospital room? 3  -KG 3  -DD    AM-PAC 6 Clicks Score (PT) 19  -KG 18  -DD    Highest level of mobility 6 --> Walked 10 steps or more  -KG 6 --> Walked 10 steps or more  -DD    Row Name 03/14/23 1440 03/14/23 0858       Functional Assessment    Outcome Measure Options AM-PAC 6 Clicks Basic Mobility (PT)  -KG AM-PAC 6 Clicks Daily Activity (OT)  -SD           User Key  (r) = Recorded By, (t) = Taken By, (c) = Cosigned By    Initials Name Provider Type    Marleni Sargent, MAXIMILIAN Occupational Therapist    Florence Lara Physical Therapist    Ivan Sethi RN Registered Nurse                             Physical Therapy Education     Title: PT OT SLP Therapies (Done)     Topic: Physical Therapy (Done)     Point: Mobility training (Done)     Learning Progress Summary           Patient Acceptance, E, VU,NR by KG at 3/14/2023 1440    Acceptance, E, NR,VU by KG at 3/13/2023 1343    Acceptance, E, NR by AS at 3/9/2023 1349    Acceptance, E,D, VU,DU by AB at 3/8/2023 1331    Acceptance, E, VU,NR by LO at 3/7/2023 1319    Comment: PT POC    Acceptance, E,TB, VU by KW at 3/6/2023 1418    Comment: cotninued benefit of skilled PT services    Acceptance, E, VU,NR by KG1 at 3/3/2023 0846    Acceptance, E, VU by SJ at 3/1/2023 1623    Acceptance, E, NR by AS at 2/27/2023 0958    Acceptance, E, VU by CM at 2/24/2023 1449   Family Acceptance, E, VU by CM at 2/24/2023 1449                   Point: Home exercise program (Done)     Learning Progress Summary           Patient Acceptance, E, VU,NR by KG at 3/14/2023 1440    Acceptance, E, NR,VU by KG at 3/13/2023 1343    Acceptance, E, NR by AS at 3/9/2023 1349    Acceptance, E, VU,NR by LO at 3/7/2023 1319    Comment: PT POC    Acceptance, E,TB, VU by KW at 3/6/2023 1418    Comment: cotninued benefit of skilled PT services    Acceptance, E, VU,NR by KG1 at 3/3/2023 0846    Acceptance, E, VU by SJ at 3/1/2023 1623    Acceptance, E, NR by AS at 2/27/2023 0958                   Point: Body mechanics (Done)     Learning Progress Summary           Patient Acceptance, E, VU,NR by KG at 3/14/2023 1440    Acceptance, E, NR,VU by KG at 3/13/2023 1343    Acceptance, E, NR by AS at 3/9/2023 1349    Acceptance, E,D, VU,DU by AB at 3/8/2023 1331    Acceptance, E, VU,NR by LO at 3/7/2023 1319    Comment: PT POC    Acceptance, E,TB, VU by  KW at 3/6/2023 1418    Comment: cotninued benefit of skilled PT services    Acceptance, E, VU,NR by KG1 at 3/3/2023 0846    Acceptance, E, VU by SJ at 3/1/2023 1623    Acceptance, E, NR by AS at 2/27/2023 0958                   Point: Precautions (Done)     Learning Progress Summary           Patient Acceptance, E, VU,NR by KG at 3/14/2023 1440    Acceptance, E, NR,VU by KG at 3/13/2023 1343    Acceptance, E, NR by AS at 3/9/2023 1349    Acceptance, E,D, VU,DU by AB at 3/8/2023 1331    Acceptance, E, VU,NR by LO at 3/7/2023 1319    Comment: PT POC    Acceptance, E,TB, VU by KW at 3/6/2023 1418    Comment: cotninued benefit of skilled PT services    Acceptance, E, VU,NR by KG1 at 3/3/2023 0846    Acceptance, E, VU by SJ at 3/1/2023 1623    Acceptance, E, NR by AS at 2/27/2023 0958    Acceptance, E, VU by CM at 2/24/2023 1449   Family Acceptance, E, VU by CM at 2/24/2023 1449                               User Key     Initials Effective Dates Name Provider Type Discipline    SJ 02/03/23 - 03/12/23 Alyssia Saini, PT Physical Therapist PT    AS 02/03/23 -  Danisha Rai, PTA Physical Therapist Assistant PT    KG1 05/22/20 -  Shanelle Roach, PT Physical Therapist PT    KG 01/04/23 -  Florence Pickett Physical Therapist PT    LO 06/16/21 -  Lynn Ferraro, PT Physical Therapist PT    KW 01/27/22 -  Hailey Zimmerman, PT Physical Therapist PT    AB 09/22/22 -  Danisha Mello, PT Physical Therapist PT    CM 09/22/22 -  Kiana Davis, PT Physical Therapist PT              PT Recommendation and Plan     Plan of Care Reviewed With: patient  Progress: improving  Outcome Evaluation: Pt continuing to work on stability, strength and safety with functional mobility.  Worked on SPC training, 115', CGA with cues for sequencing, pt also needs cues for safety awareness and stability.  Continued recommendation SNF     Time Calculation:    PT Charges     Row Name 03/14/23 1441             Time Calculation     Start Time 1350  -KG      PT Received On 03/14/23  -KG      PT Goal Re-Cert Due Date 03/16/23  -KG         Time Calculation- PT    Total Timed Code Minutes- PT 25 minute(s)  -KG         Timed Charges    16986 - PT Therapeutic Exercise Minutes 8  -KG      96465 - Gait Training Minutes  15  -KG         Total Minutes    Timed Charges Total Minutes 23  -KG       Total Minutes 23  -KG            User Key  (r) = Recorded By, (t) = Taken By, (c) = Cosigned By    Initials Name Provider Type    Florence Lara Physical Therapist              Therapy Charges for Today     Code Description Service Date Service Provider Modifiers Qty    81182860761 HC PT THER PROC EA 15 MIN 3/13/2023 Florence Pickett GP 1    31946042544 HC GAIT TRAINING EA 15 MIN 3/13/2023 Florence Pickett GP 1    37103107816 HC PT THER PROC EA 15 MIN 3/14/2023 Florence Pickett GP 1    64488231392 HC GAIT TRAINING EA 15 MIN 3/14/2023 Florence Pickett GP 1          PT G-Codes  Outcome Measure Options: AM-PAC 6 Clicks Basic Mobility (PT)  AM-PAC 6 Clicks Score (PT): 19  AM-PAC 6 Clicks Score (OT): 20  Modified Conger Scale: 4 - Moderately severe disability.  Unable to walk without assistance, and unable to attend to own bodily needs without assistance.       Florence Pickett  3/14/2023

## 2023-03-14 NOTE — PROGRESS NOTES
[facility] Medicine Services  PROGRESS NOTE    Patient Name: Dorothy Zavala  : 1945  MRN: [id number]    Date of Admission: 2023  Primary Care Physician: Provider, No Known    Subjective   Subjective     CC:  Follow up weakness     HPI:  Patient is resting in bed in NAD. She states she feels well. No acute events overnight per nursing.     ROS:  Gen- No fevers, chills  CV- No chest pain, palpitations  Resp- No cough, dyspnea  GI- No N/V/D, abd pain  All other systems have been reviewed and the pertinent positives and negatives are listed above in the HPI or ROS      Objective   Objective     Vital Signs:   Temp:  [97.9 °F (36.6 °C)-98.6 °F (37 °C)] 98.3 °F (36.8 °C)  Heart Rate:  [79-85] 82  Resp:  [16-20] 18  BP: (109-156)/(73-78) 140/78     Physical Exam:  Constitutional: Alert, obese female lying in bed in NAD  Eyes: EOMI, sclerae anicteric, no conjunctival injection  Head: NCAT  ENT: [place], moist mucous membranes   Respiratory: Nonlabored, symmetrical chest expansion, CTAB, RA  Breast: Irregular nonmobile right breast nodule  Cardiovascular: RRR, no M/R/G, +DP pulses bilaterally  Gastrointestinal: Obese, soft, NT, ND +BS  Musculoskeletal: BATES; no LE edema bilaterally, left wrist splint  Neurologic: Oriented x4, strength symmetric in all extremities, follows all commands, speech clear  Skin: No rashes on exposed skin  Psychiatric: Pleasant and cooperative; normal affect    Results Reviewed:  LAB RESULTS:      Lab 03/10/23  1121   WBC 9.48   HEMOGLOBIN 13.1   HEMATOCRIT 42.3   PLATELETS 269   NEUTROS ABS 7.45*   IMMATURE GRANS (ABS) 0.04   LYMPHS ABS 1.20   MONOS ABS 0.64   EOS ABS 0.11   MCV 93.8         Lab 03/10/23  1121   SODIUM 139   POTASSIUM 4.1   CHLORIDE 102   CO2 27.0   ANION GAP 10.0   BUN 14   CREATININE 0.63   EGFR 92.6   GLUCOSE 119*   CALCIUM 9.1                         Brief Urine Lab Results  (Last result in the past 365 days)      Color   Clarity   Blood   Leuk  Est   Nitrite   Protein   CREAT   Urine HCG        02/24/23 1449 Yellow   Cloudy   Negative   Negative   Negative   Trace                 Microbiology Results Abnormal     Procedure Component Value - Date/Time    Fungus Culture - Cerebrospinal Fluid, Lumbar Puncture [[id number]]  (Normal) Collected: 02/27/23 1246    Lab Status: Preliminary result Specimen: Cerebrospinal Fluid from Lumbar Puncture Updated: 03/13/23 1445     Fungus Culture No fungus isolated at 2 weeks    Culture, CSF - Cerebrospinal Fluid, Lumbar Puncture [[id number]] Collected: 02/27/23 1246    Lab Status: Final result Specimen: Cerebrospinal Fluid from Lumbar Puncture Updated: 03/02/23 1008     CSF Culture No growth at 3 days     Gram Stain Rare (1+) WBCs seen      No organisms seen    Blood Culture - Blood, Hand, Right [[id number]]  (Normal) Collected: 02/23/23 2122    Lab Status: Final result Specimen: Blood from Hand, Right Updated: 02/28/23 2146     Blood Culture No growth at 5 days    Narrative:      Aerobic bottle only      Blood Culture - Blood, Hand, Left [[id number]]  (Normal) Collected: 02/23/23 2122    Lab Status: Final result Specimen: Blood from Hand, Left Updated: 02/28/23 2146     Blood Culture No growth at 5 days    Narrative:      Aerobic bottle only      Meningitis / Encephalitis Panel, PCR - Cerebrospinal Fluid, Lumbar Puncture [[id number]]  (Normal) Collected: 02/27/23 1246    Lab Status: Final result Specimen: Cerebrospinal Fluid from Lumbar Puncture Updated: 02/27/23 1458     ESCHERICHIA COLI K1, PCR Not Detected     HAEMOPHILUS INFLUENZAE, PCR Not Detected     LISTERIA MONOCYTOGENES, PCR Not Detected     NEISSERIA MENINGITIDIS, PCR Not Detected     STREPTOCOCCUS AGALACTIAE, PCR Not Detected     STREPTOCOCCUS PNEUMONIAE, PCR Not Detected     CYTOMEGALOVIRUS (CMV), PCR Not Detected     ENTEROVIRUS, PCR Not Detected     HERPES SIMPLEX VIRUS 1 (HSV-1), PCR Not Detected     HERPES SIMPLEX VIRUS 2 (HSV-2), PCR Not Detected     HUMAN  PARECHOVIRUS, PCR Not Detected     VARICELLA ZOSTER VIRUS (VZV), PCR Not Detected     CRYPTOCOCCUS NEOFORMANS / GATTII, PCR Not Detected     HUMAN HERPES VIRUS 6 PCR Not Detected    Cryptococcal AG, CSF - Cerebrospinal Fluid, Lumbar Puncture [[id number]]  (Normal) Collected: 02/27/23 1246    Lab Status: Final result Specimen: Cerebrospinal Fluid from Lumbar Puncture Updated: 02/27/23 1435     Cryptococcal Antigen, CSF Negative          XR Wrist 3+ View Left    Result Date: 3/13/2023  XR WRIST 3+ VW LEFT Date of Exam: 3/13/2023 8:41 AM EDT Indication: repeat xrays to check alignment. Comparison: 2/25/2023 Findings: Again seen is fracture of the distal radius with minimally increased impaction compared to prior examination. There is evidence of bony bridging and callus formation though fracture line is still partially visible. Carpal alignment is unchanged joint space appears grossly preserved..     Impression: Impression: Healing distal radius fracture with minimally increased impaction compared to prior examination. Electronically Signed: Ang Dorman  3/13/2023 9:20 AM EDT  Workstation ID: [id number]      Results for orders placed during the hospital encounter of 02/23/23    Adult Transthoracic Echo Complete W/ Cont if Necessary Per Protocol (With Agitated Saline)    Interpretation Summary  •  Left ventricular systolic function is normal. Calculated left ventricular EF = 67.2%  •  Left ventricular diastolic function was normal.  •  Saline test results are negative for right to left atrial level shunt.  •  Estimated right ventricular systolic pressure from tricuspid regurgitation is normal (<35 mmHg).      Current medications:  Scheduled Meds:aspirin, 81 mg, Oral, Daily   Or  aspirin, 300 mg, Rectal, Daily  atorvastatin, 80 mg, Oral, Nightly  levothyroxine, 100 mcg, Oral, Q AM  melatonin, 10 mg, Oral, Nightly  NIFEdipine XL, 30 mg, Oral, Q24H  nystatin, , Topical, Q12H  pantoprazole, 40 mg, Oral, Q AM  sertraline,  50 mg, Oral, Daily  sodium chloride, 10 mL, Intravenous, Q12H      Continuous Infusions:   PRN Meds:.•  hydrOXYzine  •  Magnesium Standard Dose Replacement - Initiate Nurse / BPA Driven Protocol  •  oxyCODONE-acetaminophen  •  Phosphorus Replacement - Initiate Nurse / BPA Driven Protocol  •  Potassium Replacement - Initiate Nurse / BPA Driven Protocol  •  sodium chloride  •  sodium chloride    Assessment & Plan   Assessment & Plan     Active Hospital Problems    Diagnosis  POA   • **Encephalopathy acute [G93.40]  Yes   • Suspected acute ischemic stroke  [I63.9]  Yes   • Hypertension [I10]  Yes   • Anxiety and depression [F41.9, F32.A]  Yes   • Hypothyroidism [E03.9]  Yes   • Arthritis [M19.90]  Yes   • COPD [J44.9]  Yes   • B-cell lymphoma  [C85.90]  Yes   • ? Subdural hematoma [S06.5XAA]  Yes   • Frequent falls [R29.6]  Not Applicable   • Left wrist fracture [S62.102A]  Yes   • Illicit drug use (UDS + opiates, BZDs, and TCAs) [F19.90]  Yes      Resolved Hospital Problems   No resolved problems to display.        Brief Hospital Course to date:  Dorothy Zavala is a 77 y.o. 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 Dukes in [facility]), current falls with numerous fractures, who presented to [facility] on 2/23/2023 after being  transferred from [facility] for evaluation of stroke.Stroke imaging was negative. Per report, history from son (who is apparently estranged from his mother) suggests that she has had declining neurologic status for some time. Patient started on antibiotics initially for empiric coverage of meningitis per neurology.  They no longer feels her symptoms are concerning for meningitis and their note states that it is okay to stop antibiotics. LP in IR completed which was negative. PT/OT recommended SNF at discharge. CM is  "following for placement.      These problems are new to me today    This patient's problems and plans were partially entered by my partner and updated as appropriate by me 03/14/23.     Encephalopathy  - Improved. Possible alcohol/medication withdrawal  - Neurology initially concerned for chronic meningitis. LP per IR on 2/27/23 stable- culture negative and meningitis/encephalitis panel negative.  - Improving off abx, now back to baseline.   - PT/OT/SLP, referrals pending.     Depression  - Patient complaining of depression but states she no longer wants to take her Trazodone. My partner had a Long d/w her son who thinks depression is playing a large role in her decline particularly due to recent passing of her brother and recent loss of her house. He asked that we be \"tough on her\" otherwise she will not do much for herself.  - Started on Zoloft 50mg PO daily which seems to have helped some. Increase as tolerated as outpatient.     Right breast mass  -CT chest with contrast revealed nonspecific infiltration within the right breast with no definite drainable fluid collection. Recommended outpatient diagnostic mammogram  -family hx of breast CA   -- discussed with Dr. MARROQUIN today and he recommends mesfin diagnostic mammogram and US of right breast as outpatient. He did not recommend any surgical intervention at this time. Will need to be scheduled  as outpatient      Candidiasis   -Nystatin powder      HTN  -amlodipine d/c'd, patient felt was causing nausea  -Started on Nifedipine 30 mg daily      CVA/TIA ruled out.  - Continue asa/statin     Hypothyroidism  - Continue synthroid     Lymphoma  - Needs oncology follow up     Wrist fracture, L  - Seen by orthopedic surgery on 2/26, Dr. Ramos-  Recommended patient return in 1 to 2 weeks to clinic for repeat radiographs.   -still inpatient so repeat Xray was completed on 3/13 showed healing distal radius fracture with minimally increased impaction . I messaged and texted  " Ramos that it was completed   -Okay for weightbearing through elbow left upper extremity, otherwise nonweightbearing left upper extremity.       Expected Discharge Location and Transportation: LTC  Expected Discharge 3/15  Expected Discharge Date and Time     Expected Discharge Date Expected Discharge Time    Mar 15, 2023            DVT prophylaxis:  Mechanical DVT prophylaxis orders are present.     AM-PAC 6 Clicks Score (PT): 20 (03/13/23 1343)    CODE STATUS:   Code Status and Medical Interventions:   Ordered at: 02/23/23 7637     Code Status (Patient has no pulse and is not breathing):    CPR (Attempt to Resuscitate)     Medical Interventions (Patient has pulse or is breathing):    Full Support       Sonal Quiroz, APRN  03/14/23

## 2023-03-14 NOTE — PLAN OF CARE
Problem: Adult Inpatient Plan of Care  Goal: Plan of Care Review  Recent Flowsheet Documentation  Taken 3/14/2023 0940 by Marleni Chavez OT  Progress: improving  Plan of Care Reviewed With: patient  Outcome Evaluation: Pt. reporting increased comfort @splint site with introduction of stockinette sleeve. Pt. with increasing ADL independence, donning socks independently & robe. Pt. reported dizziness & fatigue with ambulation in hallway. Will cont to progress pt as able per POC. Recommend SNF upon d/c.   Goal Outcome Evaluation:  Plan of Care Reviewed With: patient        Progress: improving  Outcome Evaluation: Pt. reporting increased comfort @splint site with introduction of stockinette sleeve. Pt. with increasing ADL independence, donning socks independently & robe. Pt. reported dizziness & fatigue with ambulation in hallway. Will cont to progress pt as able per POC. Recommend SNF upon d/c.

## 2023-03-15 PROCEDURE — 99231 SBSQ HOSP IP/OBS SF/LOW 25: CPT | Performed by: NURSE PRACTITIONER

## 2023-03-15 RX ADMIN — OXYCODONE HYDROCHLORIDE AND ACETAMINOPHEN 1 TABLET: 5; 325 TABLET ORAL at 16:01

## 2023-03-15 RX ADMIN — SERTRALINE HYDROCHLORIDE 50 MG: 50 TABLET ORAL at 08:36

## 2023-03-15 RX ADMIN — LEVOTHYROXINE SODIUM 100 MCG: 0.1 TABLET ORAL at 05:46

## 2023-03-15 RX ADMIN — Medication 10 ML: at 20:45

## 2023-03-15 RX ADMIN — ASPIRIN 81 MG CHEWABLE TABLET 81 MG: 81 TABLET CHEWABLE at 08:30

## 2023-03-15 RX ADMIN — Medication 10 ML: at 08:37

## 2023-03-15 RX ADMIN — ATORVASTATIN CALCIUM 80 MG: 40 TABLET, FILM COATED ORAL at 20:44

## 2023-03-15 RX ADMIN — OXYCODONE HYDROCHLORIDE AND ACETAMINOPHEN 1 TABLET: 5; 325 TABLET ORAL at 11:10

## 2023-03-15 RX ADMIN — NYSTATIN: 100000 POWDER TOPICAL at 20:45

## 2023-03-15 RX ADMIN — OXYCODONE HYDROCHLORIDE AND ACETAMINOPHEN 1 TABLET: 5; 325 TABLET ORAL at 06:02

## 2023-03-15 RX ADMIN — HYDROXYZINE HYDROCHLORIDE 25 MG: 25 TABLET ORAL at 08:36

## 2023-03-15 RX ADMIN — NYSTATIN: 100000 POWDER TOPICAL at 08:37

## 2023-03-15 RX ADMIN — OXYCODONE HYDROCHLORIDE AND ACETAMINOPHEN 1 TABLET: 5; 325 TABLET ORAL at 01:23

## 2023-03-15 RX ADMIN — PANTOPRAZOLE SODIUM 40 MG: 40 TABLET, DELAYED RELEASE ORAL at 05:46

## 2023-03-15 RX ADMIN — OXYCODONE HYDROCHLORIDE AND ACETAMINOPHEN 1 TABLET: 5; 325 TABLET ORAL at 20:45

## 2023-03-15 RX ADMIN — NIFEDIPINE 30 MG: 30 TABLET, FILM COATED, EXTENDED RELEASE ORAL at 08:36

## 2023-03-15 NOTE — PROGRESS NOTES
[facility] Medicine Services  PROGRESS NOTE    Patient Name: Dorothy Zavala  : 1945  MRN: [id number]    Date of Admission: 2023  Primary Care Physician: Provider, No Known    Subjective   Subjective     CC:  Follow up weakness     HPI:  Patient sitting up in bed stating she is doing fine.  States that she recently moved from [place] to [facility] and does not have any housing.  She understands she needs to follow-up with orthopedic outpatient as well as getting breast mass evaluated.    ROS:  Gen- No fevers, chills  CV- No chest pain, palpitations  Resp- No cough, dyspnea  GI- No N/V/D, abd pain  All other systems have been reviewed and the pertinent positives and negatives are listed above in the HPI or ROS      Objective   Objective     Vital Signs:   Temp:  [97.8 °F (36.6 °C)-98.4 °F (36.9 °C)] 98.2 °F (36.8 °C)  Heart Rate:  [77-87] 77  Resp:  [18-20] 20  BP: (123-176)/(72-93) 176/73     Physical Exam:  Constitutional: Alert, obese female lying in bed in NAD  Eyes: EOMI, sclerae anicteric, no conjunctival injection  Head: NCAT  ENT: [place], moist mucous membranes   Respiratory: Nonlabored, symmetrical chest expansion, CTAB, RA  Breast: Irregular nonmobile right breast nodule  Cardiovascular: RRR, no M/R/G, +DP pulses bilaterally  Gastrointestinal: Obese, soft, NT, ND +BS  Musculoskeletal: BATES; no LE edema bilaterally, left wrist splint  Neurologic: Oriented x4, strength symmetric in all extremities, follows all commands, speech clear  Skin: No rashes on exposed skin  Psychiatric: Pleasant and cooperative; normal affect    No change from 3/14    Results Reviewed:  LAB RESULTS:      Lab 03/10/23  1121   WBC 9.48   HEMOGLOBIN 13.1   HEMATOCRIT 42.3   PLATELETS 269   NEUTROS ABS 7.45*   IMMATURE GRANS (ABS) 0.04   LYMPHS ABS 1.20   MONOS ABS 0.64   EOS ABS 0.11   MCV 93.8         Lab 03/10/23  1121   SODIUM 139   POTASSIUM 4.1   CHLORIDE 102   CO2 27.0   ANION GAP 10.0   BUN 14    CREATININE 0.63   EGFR 92.6   GLUCOSE 119*   CALCIUM 9.1                         Brief Urine Lab Results  (Last result in the past 365 days)      Color   Clarity   Blood   Leuk Est   Nitrite   Protein   CREAT   Urine HCG        02/24/23 1449 Yellow   Cloudy   Negative   Negative   Negative   Trace                 Microbiology Results Abnormal     Procedure Component Value - Date/Time    Fungus Culture - Cerebrospinal Fluid, Lumbar Puncture [[id number]]  (Normal) Collected: 02/27/23 1246    Lab Status: Preliminary result Specimen: Cerebrospinal Fluid from Lumbar Puncture Updated: 03/13/23 1445     Fungus Culture No fungus isolated at 2 weeks    Culture, CSF - Cerebrospinal Fluid, Lumbar Puncture [[id number]] Collected: 02/27/23 1246    Lab Status: Final result Specimen: Cerebrospinal Fluid from Lumbar Puncture Updated: 03/02/23 1008     CSF Culture No growth at 3 days     Gram Stain Rare (1+) WBCs seen      No organisms seen    Blood Culture - Blood, Hand, Right [[id number]]  (Normal) Collected: 02/23/23 2122    Lab Status: Final result Specimen: Blood from Hand, Right Updated: 02/28/23 2146     Blood Culture No growth at 5 days    Narrative:      Aerobic bottle only      Blood Culture - Blood, Hand, Left [[id number]]  (Normal) Collected: 02/23/23 2122    Lab Status: Final result Specimen: Blood from Hand, Left Updated: 02/28/23 2146     Blood Culture No growth at 5 days    Narrative:      Aerobic bottle only      Meningitis / Encephalitis Panel, PCR - Cerebrospinal Fluid, Lumbar Puncture [[id number]]  (Normal) Collected: 02/27/23 1246    Lab Status: Final result Specimen: Cerebrospinal Fluid from Lumbar Puncture Updated: 02/27/23 1458     ESCHERICHIA COLI K1, PCR Not Detected     HAEMOPHILUS INFLUENZAE, PCR Not Detected     LISTERIA MONOCYTOGENES, PCR Not Detected     NEISSERIA MENINGITIDIS, PCR Not Detected     STREPTOCOCCUS AGALACTIAE, PCR Not Detected     STREPTOCOCCUS PNEUMONIAE, PCR Not Detected      CYTOMEGALOVIRUS (CMV), PCR Not Detected     ENTEROVIRUS, PCR Not Detected     HERPES SIMPLEX VIRUS 1 (HSV-1), PCR Not Detected     HERPES SIMPLEX VIRUS 2 (HSV-2), PCR Not Detected     HUMAN PARECHOVIRUS, PCR Not Detected     VARICELLA ZOSTER VIRUS (VZV), PCR Not Detected     CRYPTOCOCCUS NEOFORMANS / GATTII, PCR Not Detected     HUMAN HERPES VIRUS 6 PCR Not Detected    Cryptococcal AG, CSF - Cerebrospinal Fluid, Lumbar Puncture [[id number]]  (Normal) Collected: 02/27/23 1246    Lab Status: Final result Specimen: Cerebrospinal Fluid from Lumbar Puncture Updated: 02/27/23 1435     Cryptococcal Antigen, CSF Negative          No radiology results from the last 24 hrs    Results for orders placed during the hospital encounter of 02/23/23    Adult Transthoracic Echo Complete W/ Cont if Necessary Per Protocol (With Agitated Saline)    Interpretation Summary  •  Left ventricular systolic function is normal. Calculated left ventricular EF = 67.2%  •  Left ventricular diastolic function was normal.  •  Saline test results are negative for right to left atrial level shunt.  •  Estimated right ventricular systolic pressure from tricuspid regurgitation is normal (<35 mmHg).      Current medications:  Scheduled Meds:aspirin, 81 mg, Oral, Daily   Or  aspirin, 300 mg, Rectal, Daily  atorvastatin, 80 mg, Oral, Nightly  levothyroxine, 100 mcg, Oral, Q AM  melatonin, 10 mg, Oral, Nightly  NIFEdipine XL, 30 mg, Oral, Q24H  nystatin, , Topical, Q12H  pantoprazole, 40 mg, Oral, Q AM  sertraline, 50 mg, Oral, Daily  sodium chloride, 10 mL, Intravenous, Q12H      Continuous Infusions:   PRN Meds:.•  hydrOXYzine  •  Magnesium Standard Dose Replacement - Initiate Nurse / BPA Driven Protocol  •  oxyCODONE-acetaminophen  •  Phosphorus Replacement - Initiate Nurse / BPA Driven Protocol  •  Potassium Replacement - Initiate Nurse / BPA Driven Protocol  •  sodium chloride  •  sodium chloride    Assessment & Plan   Assessment & Plan     Active  Hospital Problems    Diagnosis  POA   • **Encephalopathy acute [G93.40]  Yes   • Suspected acute ischemic stroke  [I63.9]  Yes   • Hypertension [I10]  Yes   • Anxiety and depression [F41.9, F32.A]  Yes   • Hypothyroidism [E03.9]  Yes   • Arthritis [M19.90]  Yes   • COPD [J44.9]  Yes   • B-cell lymphoma  [C85.90]  Yes   • ? Subdural hematoma [S06.5XAA]  Yes   • Frequent falls [R29.6]  Not Applicable   • Left wrist fracture [S62.102A]  Yes   • Illicit drug use (UDS + opiates, BZDs, and TCAs) [F19.90]  Yes      Resolved Hospital Problems   No resolved problems to display.        Brief Hospital Course to date:  Dortohy Zavala is a 77 y.o. 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 Dukes in [facility]), current falls with numerous fractures, who presented to [facility] on 2/23/2023 after being  transferred from [facility] for evaluation of stroke.Stroke imaging was negative. Per report, history from son (who is apparently estranged from his mother) suggests that she has had declining neurologic status for some time. Patient started on antibiotics initially for empiric coverage of meningitis per neurology.  They no longer feels her symptoms are concerning for meningitis and their note states that it is okay to stop antibiotics. LP in IR completed which was negative. PT/OT recommended SNF at discharge. CM is following for placement.      These problems are new to me today    This patient's problems and plans were partially entered by my partner and updated as appropriate by me 03/15/23.     Encephalopathy  - Improved. Possible alcohol/medication withdrawal  - Neurology initially concerned for chronic meningitis. LP per IR on 2/27/23 stable- culture negative and meningitis/encephalitis panel negative.  - Improving off abx, now back to baseline.   -  "PT/OT/SLP  -- Patient walking on 15 feet with walker and she states she would like one, because she feels she is still has an unstable gait CM will \"get walker and is going to get her out of the hotel room [place] the stom[place].     Depression  - Patient complaining of depression but states she no longer wants to take her Trazodone. My partner had a Long d/w her son who thinks depression is playing a large role in her decline particularly due to recent passing of her brother and recent loss of her house. He asked that we be \"tough on her\" otherwise she will not do much for herself.  - Started on Zoloft 50mg PO daily which seems to have helped some. Increase as tolerated as outpatient.     Right breast mass  -CT chest with contrast revealed nonspecific infiltration within the right breast with no definite drainable fluid collection. Recommended outpatient diagnostic mammogram  -family hx of breast CA   -- discussed with Dr. MARROQUIN today and he recommends mesfin diagnostic mammogram and US of right breast as outpatient. He did not recommend any surgical intervention at this time. Will need to be scheduled  as outpatient      Candidiasis   -Nystatin powder      HTN  -amlodipine d/c'd, patient felt was causing nausea  -Started on Nifedipine 30 mg daily      CVA/TIA ruled out.  - Continue asa/statin     Hypothyroidism  - Continue synthroid     Lymphoma  - Needs oncology follow up     Wrist fracture, L  - Seen by orthopedic surgery on 2/26, Dr. Ramos-  Recommended patient return in 1 to 2 weeks to clinic for repeat radiographs.   -still inpatient so repeat Xray was completed on 3/13 showed healing distal radius fracture with minimally increased impaction .  -Okay for weightbearing through elbow left upper extremity, otherwise nonweightbearing left upper extremity.       Expected Discharge Location and Transportation: Hotel, ?CM working on dispo  Expected Discharge   Expected Discharge Date and Time     Expected " Discharge Date Expected Discharge Time    Mar 16, 2023            DVT prophylaxis:  Mechanical DVT prophylaxis orders are present.     AM-PAC 6 Clicks Score (PT): 18 (03/15/23 0800)    CODE STATUS:   Code Status and Medical Interventions:   Ordered at: 02/23/23 1517     Code Status (Patient has no pulse and is not breathing):    CPR (Attempt to Resuscitate)     Medical Interventions (Patient has pulse or is breathing):    Full Support       Sonal Quiroz, APRN  03/15/23

## 2023-03-15 NOTE — PLAN OF CARE
Goal Outcome Evaluation:              Outcome Evaluation: VSS on RA. Left wrist pain managed w/scheduled meds & prn percocet. No c/o N/V. Slept well overnight. Adequate UOP, no BM this shift. Stand-by assist. Calm, cooperative, pleasant. Will continue to monitor.

## 2023-03-16 PROBLEM — G93.40 ENCEPHALOPATHY ACUTE: Status: RESOLVED | Noted: 2023-02-23 | Resolved: 2023-03-16

## 2023-03-16 PROCEDURE — 99232 SBSQ HOSP IP/OBS MODERATE 35: CPT | Performed by: INTERNAL MEDICINE

## 2023-03-16 PROCEDURE — 92507 TX SP LANG VOICE COMM INDIV: CPT

## 2023-03-16 RX ORDER — NIFEDIPINE 30 MG/1
30 TABLET, FILM COATED, EXTENDED RELEASE ORAL
Qty: 30 TABLET | Refills: 0 | Status: SHIPPED | OUTPATIENT
Start: 2023-03-16 | End: 2023-03-17 | Stop reason: SDUPTHER

## 2023-03-16 RX ORDER — ASPIRIN 81 MG/1
81 TABLET, CHEWABLE ORAL DAILY
Qty: 30 TABLET | Refills: 0 | Status: SHIPPED | OUTPATIENT
Start: 2023-03-16 | End: 2023-03-17 | Stop reason: SDUPTHER

## 2023-03-16 RX ORDER — ATORVASTATIN CALCIUM 80 MG/1
80 TABLET, FILM COATED ORAL NIGHTLY
Qty: 30 TABLET | Refills: 0 | Status: SHIPPED | OUTPATIENT
Start: 2023-03-16 | End: 2023-03-17 | Stop reason: SDUPTHER

## 2023-03-16 RX ADMIN — OXYCODONE HYDROCHLORIDE AND ACETAMINOPHEN 1 TABLET: 5; 325 TABLET ORAL at 01:10

## 2023-03-16 RX ADMIN — Medication 10 ML: at 08:00

## 2023-03-16 RX ADMIN — ASPIRIN 81 MG CHEWABLE TABLET 81 MG: 81 TABLET CHEWABLE at 08:00

## 2023-03-16 RX ADMIN — LEVOTHYROXINE SODIUM 100 MCG: 0.1 TABLET ORAL at 05:41

## 2023-03-16 RX ADMIN — OXYCODONE HYDROCHLORIDE AND ACETAMINOPHEN 1 TABLET: 5; 325 TABLET ORAL at 21:12

## 2023-03-16 RX ADMIN — PANTOPRAZOLE SODIUM 40 MG: 40 TABLET, DELAYED RELEASE ORAL at 05:41

## 2023-03-16 RX ADMIN — ATORVASTATIN CALCIUM 80 MG: 40 TABLET, FILM COATED ORAL at 21:12

## 2023-03-16 RX ADMIN — HYDROXYZINE HYDROCHLORIDE 25 MG: 25 TABLET ORAL at 22:00

## 2023-03-16 RX ADMIN — NYSTATIN: 100000 POWDER TOPICAL at 08:13

## 2023-03-16 RX ADMIN — OXYCODONE HYDROCHLORIDE AND ACETAMINOPHEN 1 TABLET: 5; 325 TABLET ORAL at 10:34

## 2023-03-16 RX ADMIN — OXYCODONE HYDROCHLORIDE AND ACETAMINOPHEN 1 TABLET: 5; 325 TABLET ORAL at 05:41

## 2023-03-16 RX ADMIN — NIFEDIPINE 30 MG: 30 TABLET, FILM COATED, EXTENDED RELEASE ORAL at 08:14

## 2023-03-16 RX ADMIN — SERTRALINE HYDROCHLORIDE 50 MG: 50 TABLET ORAL at 08:13

## 2023-03-16 RX ADMIN — Medication 10 ML: at 21:15

## 2023-03-16 NOTE — THERAPY TREATMENT NOTE
Acute Care - Speech Language Pathology Progress Note  [facility]     Patient Name: Dorothy Zavala  : 1945  MRN: [id number]  Today's Date: 3/16/2023               Admit Date: 2023     Visit Dx:    ICD-10-CM ICD-9-CM   1. Cognitive communication deficit  R41.841 799.52   2. Encephalopathy acute  G93.40 348.30   3. Frequent falls  R29.6 V15.88   4. Suspected acute ischemic stroke   I63.9 434.91   5. ? Subdural hematoma  S06.5XAA 432.1   6. Closed fracture of left wrist, initial encounter  S62.102A 814.00     Patient Active Problem List   Diagnosis   • Hypertension   • Anxiety and depression   • Hypothyroidism   • Arthritis   • COPD   • B-cell lymphoma    • Frequent falls   • Left wrist fracture   • Illicit drug use (UDS + opiates, BZDs, and TCAs)     Past Medical History:   Diagnosis Date   • Anxiety and depression 2023   • Arthritis 2023   • B-cell lymphoma  2023   • COPD 2023   • Hypertension 2023   • Hypothyroidism 2023     History reviewed. No pertinent surgical history.    SLP Recommendation and Plan  SLP Diagnosis: mild, functional cognitive-linguistic skills (23 1500)           SLC Criteria for Skilled Therapy Interventions Met: yes (23 1500)                 Daily Summary of Progress (SLP): progress toward functional goals is good (23 1500)           Treatment Assessment (SLP): continued, improved (23 1500)  Treatment Assessment Comments (SLP): patient with significant improvement (23 1500)  Plan for Continued Treatment (SLP): continue treatment per plan of care (23 1500)  Plan of Care Reviewed With: patient (23 1540)  Progress: improving (23 1540)      SLP EVALUATION (last 72 hours)     SLP SLC Evaluation     Row Name 23 1500                   Communication Assessment/Intervention    Document Type therapy note (daily note)  -AV        Subjective Information no complaints  -AV        Patient Observations  alert;cooperative  -AV        Patient/Family/Caregiver Comments/Observations none present  -AV        Patient Effort good  -AV           Pain    Additional Documentation Pain Scale: FACES Pre/Post-Treatment (Group)  -AV           Pain Scale: FACES Pre/Post-Treatment    Pain: FACES Scale, Pretreatment 0-->no hurt  -AV           SLP Evaluation Clinical Impressions    SLP Diagnosis mild;functional cognitive-linguistic skills  -AV        Rehab Potential/Prognosis good  -AV        SLC Criteria for Skilled Therapy Interventions Met yes  -AV        Functional Impact functional impact in social situations;functional impact in ADLs  -AV           SLP Treatment Clinical Impressions    Treatment Assessment (SLP) continued;improved  -AV        Treatment Assessment Comments (SLP) patient with significant improvement  -AV        Daily Summary of Progress (SLP) progress toward functional goals is good  -AV        Plan for Continued Treatment (SLP) continue treatment per plan of care  -AV        Care Plan Review care plan/treatment goals reviewed  -AV              User Key  (r) = Recorded By, (t) = Taken By, (c) = Cosigned By    Initials Name Effective Dates    AV Kimberlyn Allen MS CCC-SLP 06/16/21 -                    EDUCATION  The patient has been educated in the following areas:     Cognitive Impairment Communication Impairment.           SLP GOALS     Row Name 03/16/23 1500             Patient will demonstrate functional speech skills for return to discharge environment    [place] with minimal cues  -AV      Time frame by discharge  -AV      Progress/Outcomes goal met  -AV         Patient will demonstrate functional language skills for return to discharge environment     [place] with minimal cues  -AV      Time frame by discharge  -AV      Progress/Outcomes continuing progress toward goal  -AV         Patient will demonstrate functional cognitive-linguistic skills for return to discharge environment     [place] with minimal cues  -AV      Time frame by discharge  -AV      Progress/Outcomes continuing progress toward goal  -AV         Connected Speech to Express Thoughts Goal 1 (SLP)    Improve Narrative Discourse to Express Thoughts By Goal 1 (SLP) giving multiple definitions of a word;describing a picture;explaining a proverb or idiom;conversational task on a given topic;80%;with minimal cues (75-90%)  -AV      Time Frame (Connected Speech Goal 1, SLP) short term goal (STG)  -AV      Progress (Connected Speech Goal 1, SLP) 70%;80%;with minimal cues (75-90%)  -AV      Progress/Outcomes (Connected Speech Goal 1, SLP) continuing progress toward goal  -AV         Awareness of Basic Personal Information Goal 1 (SLP)    Improve Awareness of Basic Personal Information Goal 1 (SLP) naming self, family members;answering open-ended questions regarding personal/biographical information;80%;with minimal cues (75-90%)  -AV      Time Frame (Awareness of Basic Personal Information Goal 1, SLP) short term goal (STG)  -AV      Progress (Awareness of Basic Personal Information Goal 1, SLP) 80%;90%;with minimal cues (75-90%)  -AV      Progress/Outcomes (Awareness of Basic Personal Information Goal 1, SLP) continuing progress toward goal  -AV            User Key  (r) = Recorded By, (t) = Taken By, (c) = Cosigned By    Initials Name Provider Type    Kimberlyn Delarosa MS CCC-SLP Speech and Language Pathologist                        Time Calculation:      Time Calculation- SLP     Row Name 03/16/23 1541             Time Calculation- SLP    SLP Start Time 1500  -AV      SLP Received On 03/16/23  -AV         Untimed Charges    [id number] Treatment/ST Modification Prosth Aug Alter  25  -AV         Total Minutes    Untimed Charges Total Minutes 25  -AV       Total Minutes 25  -AV            User Key  (r) = Recorded By, (t) = Taken By, (c) = Cosigned By    Initials Name Provider Type    Kimberlyn Delarosa MS CCC-SLP  Speech and Language Pathologist                Therapy Charges for Today     Code Description Service Date Service Provider Modifiers Qty    [id number] [facility] TREATMENT SPEECH 2 3/16/2023 Kimberlyn Allen, MS CCC-SLP GN 1                     Kimberlyn Hughes MS CCC-SLP  3/16/2023

## 2023-03-16 NOTE — PLAN OF CARE
Goal Outcome Evaluation:  Plan of Care Reviewed With: patient        Progress: improving       SLP treatment completed. Will continue to address cog/comm. Please see note for further details and recommendations.

## 2023-03-16 NOTE — THERAPY PROGRESS REPORT/RE-CERT
Patient Name: Dorothy Zavala  : 1945    MRN: [id number]                              Today's Date: 3/16/2023       Admit Date: 2023    Visit Dx:     ICD-10-CM ICD-9-CM   1. Cognitive communication deficit  R41.841 799.52   2. Dysphagia, unspecified type  R13.10 787.20   3. Encephalopathy acute  G93.40 348.30   4. Frequent falls  R29.6 V15.88   5. Suspected acute ischemic stroke   I63.9 434.91   6. ? Subdural hematoma  S06.5XAA 432.1   7. Closed fracture of left wrist, initial encounter  S62.102A 814.00     Patient Active Problem List   Diagnosis   • Hypertension   • Anxiety and depression   • Hypothyroidism   • Arthritis   • COPD   • B-cell lymphoma    • Frequent falls   • Left wrist fracture   • Illicit drug use (UDS + opiates, BZDs, and TCAs)     Past Medical History:   Diagnosis Date   • Anxiety and depression 2023   • Arthritis 2023   • B-cell lymphoma  2023   • COPD 2023   • Hypertension 2023   • Hypothyroidism 2023     History reviewed. No pertinent surgical history.   General Information     Row Name 23 0948          Physical Therapy Time and Intention    Document Type therapy note (daily note)  -MB     Mode of Treatment physical therapy  -MB     Row Name 23 0948          General Information    Patient Profile Reviewed yes  -MB     Existing Precautions/Restrictions fall;left;non-weight bearing;other (see comments)  L UE NWB, except may bear wt. thru elbow; in wrist splint AAT  -MB     Barriers to Rehab medically complex  -MB     Row Name 23 0948          Cognition    Orientation Status (Cognition) oriented x 3  -MB     Row Name 23 0948          Safety Issues, Functional Mobility    Safety Issues Affecting Function (Mobility) awareness of need for assistance;insight into deficits/self-awareness;safety precaution awareness;safety precautions follow-through/compliance  -MB     Impairments Affecting Function (Mobility) balance;strength;shortness of  breath;endurance/activity tolerance;pain  -MB           User Key  (r) = Recorded By, (t) = Taken By, (c) = Cosigned By    Initials Name Provider Type    Cierra Donato, PT Physical Therapist               Mobility     Row Name 03/16/23 1322          Bed Mobility    Supine-Sit [place] (Bed Mobility) modified independence  -MB     Assistive Device (Bed Mobility) bed rails;head of bed elevated  -MB     Comment, (Bed Mobility) Pt. advanced to EOB independently w/ heavy reliance on HOB elevated, w/ VCs to maintain NWB LLE while scooting towards EOB.  -MB     Row Name 03/16/23 1322          Transfers    Comment, (Transfers) Pt. demo safe and appropriate transfer technique w/ no LOB or instability.  -MB     Row Name 03/16/23 1322          Sit-Stand Transfer    Sit-Stand [place] (Transfers) contact guard;verbal cues  -MB     Assistive Device (Sit-Stand Transfers) walker, platform  -MB     Row Name 03/16/23 1322          Gait/Stairs (Locomotion)    [place] Level (Gait) contact guard;verbal cues  -MB     Assistive Device (Gait) walker, platform  -MB     Distance in Feet (Gait) 110  -MB     Deviations/Abnormal Patterns (Gait) [place] decreased;gait speed decreased;stride length decreased  -MB     Bilateral Gait Deviations forward flexed posture  -MB     Comment, (Gait/Stairs) Pt. ambulated w/ step through gait pattern w/ slower pace. VCs for forward gaze and safe use of platform RW. Gait distance limited by fatigue/c/o dizziness; VSS.  -MB     Row Name 03/16/23 1322          Mobility    Extremity Weight-bearing Status left upper extremity  -MB     Left Upper Extremity (Weight-bearing Status) non weight-bearing (NWB)  may weight bear through L elbow  -MB           User Key  (r) = Recorded By, (t) = Taken By, (c) = Cosigned By    Initials Name Provider Type    Cierra Donato, PT Physical Therapist               Obj/Interventions     Row Name 03/16/23 1327          Motor Skills    Therapeutic  Exercise hip;ankle;knee  -MB     Row Name 03/16/23 1327          Hip (Therapeutic Exercise)    Hip (Therapeutic Exercise) strengthening exercise  -MB     Hip Strengthening (Therapeutic Exercise) bilateral;marching while seated;15 repititions  -MB     Row Name 03/16/23 1327          Knee (Therapeutic Exercise)    Knee (Therapeutic Exercise) strengthening exercise  -MB     Knee Strengthening (Therapeutic Exercise) bilateral;LAQ (long arc quad);15 repititions  -MB     Row Name 03/16/23 1327          Ankle (Therapeutic Exercise)    Ankle AROM (Therapeutic Exercise) bilateral;dorsiflexion;plantarflexion;10 repetitions  -MB     Row Name 03/16/23 1327          Balance    Static Standing Balance contact guard  -MB     Dynamic Standing Balance contact guard  -MB     Position/Device Used, Standing Balance supported;walker, platform  -MB     Balance Interventions standing;sit to stand;occupation based/functional task;weight shifting activity;dynamic reaching  -MB           User Key  (r) = Recorded By, (t) = Taken By, (c) = Cosigned By    Initials Name Provider Type    MB Cierra Concepcion, PT Physical Therapist               Goals/Plan     [facility] Name 03/16/23 1334          Transfer Goal 1 (PT)    Activity/Assistive Device (Transfer Goal 1, PT) sit-to-stand/stand-to-sit;bed-to-chair/chair-to-bed  -MB     [place] Level/Cues Needed (Transfer Goal 1, PT) supervision required  -MB     Time Frame (Transfer Goal 1, PT) long term goal (LTG);10 days  -MB     Progress/Outcome (Transfer Goal 1, PT) goal ongoing;continuing progress toward goal  -[facility] Name 03/16/23 1334          Gait Training Goal 1 (PT)    Activity/Assistive Device (Gait Training Goal 1, PT) gait (walking locomotion);walker, platform  -MB     [place] Level (Gait Training Goal 1, PT) modified independence  -MB     Distance (Gait Training Goal 1, PT) 250'  -MB     Time Frame (Gait Training Goal 1, PT) long term goal (LTG);10 days  -MB     Progress/Outcome  (Gait Training Goal 1, PT) goal ongoing  -MB     Row Name 03/16/23 1334          Therapy Assessment/Plan (PT)    Planned Therapy Interventions (PT) balance training;bed mobility training;gait training;home exercise program;postural re-education;stair training;strengthening;transfer training;motor coordination training;ROM (range of motion)  -MB           User Key  (r) = Recorded By, (t) = Taken By, (c) = Cosigned By    Initials Name Provider Type    Cierra Donato, PT Physical Therapist               Clinical Impression     Row Name 03/16/23 1329          Pain    Pain Intervention(s) Ambulation/increased activity;Repositioned  -MB     Row Name 03/16/23 1329          Pain Scale: FACES Pre/Post-Treatment    Pain: FACES Scale, Pretreatment 2-->hurts little bit  -MB     Posttreatment Pain Rating 2-->hurts little bit  -MB     Pain Location - Side/Orientation Left  -MB     Pain Location - wrist  -MB     Row Name 03/16/23 1329          Plan of Care Review    Plan of Care Reviewed With patient  -MB     Progress no change  -MB     Outcome Evaluation Patient continues to be limited by generalized weakness, mild balance deficits, mildly unsteady gait, decreased activity tolerance, and remains below her functional baseline. Pt. ambulated in hallway w/ L platform RW w/ improve stability; she does continue to fatigue and report onset dizziness w/ mobility (VSS). PT recommends L platform RW  and IP rehab at D/C.  -MB     Row Name 03/16/23 1324          Vital Signs    Pre Systolic BP Rehab 143  -MB     Pre Treatment Diastolic BP 88  -MB     Intra Systolic BP Rehab 113  -MB     Intra Treatment Diastolic BP 69  -MB     Post Systolic BP Rehab 127  -MB     Post Treatment Diastolic BP 74  -MB     Pre Patient Position Supine  -MB     Intra Patient Position Standing  -MB     Post Patient Position Sitting  -MB     Row Name 03/16/23 1329          Positioning and Restraints    Pre-Treatment Position in bed  -MB     Post Treatment  Position chair  -MB     In Chair notified nsg;reclined;call light within reach;encouraged to call for assist;exit alarm on;legs elevated;with brace;LUE elevated  -MB           User Key  (r) = Recorded By, (t) = Taken By, (c) = Cosigned By    Initials Name Provider Type    Cierra Donato, PT Physical Therapist               Outcome Measures     Row Name 03/16/23 1336 03/16/23 1333       How much help from another person do you currently need...    Turning from your back to your side while in flat bed without using bedrails? 3  -MB 3  -MB    Moving from lying on back to sitting on the side of a flat bed without bedrails? 3  -MB 3  -MB    Moving to and from a bed to a chair (including a wheelchair)? 3  -MB 3  -MB    Standing up from a chair using your arms (e.g., wheelchair, bedside chair)? 3  -MB 3  -MB    Climbing 3-5 steps with a railing? 3  -MB 3  -MB    To walk in hospital room? 3  -MB 3  -MB    AM-PAC 6 Clicks Score (PT) 18  -MB 18  -MB    Highest level of mobility 6 --> Walked 10 steps or more  -MB 6 --> Walked 10 steps or more  -MB    Row Name 03/16/23 0800          How much help from another person do you currently need...    Turning from your back to your side while in flat bed without using bedrails? 3  -JM     Moving from lying on back to sitting on the side of a flat bed without bedrails? 3  -JM     Moving to and from a bed to a chair (including a wheelchair)? 3  -JM     Standing up from a chair using your arms (e.g., wheelchair, bedside chair)? 3  -JM     Climbing 3-5 steps with a railing? 3  -JM     To walk in hospital room? 3  -JM     AM-PAC 6 Clicks Score (PT) 18  -JM     Highest level of mobility 6 --> Walked 10 steps or more  -JM     Row Name 03/16/23 1336 03/16/23 1333       Functional Assessment    Outcome Measure Options AM-PAC 6 Clicks Basic Mobility (PT)  -MB AM-PAC 6 Clicks Basic Mobility (PT)  -MB          User Key  (r) = Recorded By, (t) = Taken By, (c) = Cosigned By    Initials Name  Provider Type    Cierra Donato, PT Physical Therapist    Nereida Chávez, RN Registered Nurse                             Physical Therapy Education     Title: PT OT SLP Therapies (Done)     Topic: Physical Therapy (Done)     Point: Mobility training (Done)     Learning Progress Summary           Patient Acceptance, E, VU,NR by KG at 3/14/2023 1440    Acceptance, E, NR,VU by KG at 3/13/2023 1343    Acceptance, E, NR by AS at 3/9/2023 1349    Acceptance, E,D, VU,DU by AB at 3/8/2023 1331    Acceptance, E, VU,NR by LO at 3/7/2023 1319    Comment: PT POC    Acceptance, E,TB, VU by KW at 3/6/2023 1418    Comment: cotninued benefit of skilled PT services    Acceptance, E, VU,NR by KG1 at 3/3/2023 0846    Acceptance, E, VU by SJ at 3/1/2023 1623    Acceptance, E, NR by AS at 2/27/2023 0958    Acceptance, E, VU by CM at 2/24/2023 1449   Family Acceptance, E, VU by CM at 2/24/2023 1449                   Point: Home exercise program (Done)     Learning Progress Summary           Patient Acceptance, E, VU,NR by KG at 3/14/2023 1440    Acceptance, E, NR,VU by KG at 3/13/2023 1343    Acceptance, E, NR by AS at 3/9/2023 1349    Acceptance, E, VU,NR by LO at 3/7/2023 1319    Comment: PT POC    Acceptance, E,TB, VU by KW at 3/6/2023 1418    Comment: cotninued benefit of skilled PT services    Acceptance, E, VU,NR by KG1 at 3/3/2023 0846    Acceptance, E, VU by SJ at 3/1/2023 1623    Acceptance, E, NR by AS at 2/27/2023 0958                   Point: Body mechanics (Done)     Learning Progress Summary           Patient Acceptance, E, VU,NR by KG at 3/14/2023 1440    Acceptance, E, NR,VU by KG at 3/13/2023 1343    Acceptance, E, NR by AS at 3/9/2023 1349    Acceptance, E,D, VU,DU by AB at 3/8/2023 1331    Acceptance, E, VU,NR by LO at 3/7/2023 1319    Comment: PT POC    Acceptance, ETB, VU by KW at 3/6/2023 1418    Comment: cotninued benefit of skilled PT services    Acceptance, E, VU,NR by KG1 at 3/3/2023 9848     Acceptance, E, VU by SJ at 3/1/2023 1623    Acceptance, E, NR by AS at 2/27/2023 0958                   Point: Precautions (Done)     Learning Progress Summary           Patient Acceptance, E, VU,NR by KG at 3/14/2023 1440    Acceptance, E, NR,VU by KG at 3/13/2023 1343    Acceptance, E, NR by AS at 3/9/2023 1349    Acceptance, E,D, VU,DU by AB at 3/8/2023 1331    Acceptance, E, VU,NR by LO at 3/7/2023 1319    Comment: PT POC    Acceptance, E,TB, VU by KW at 3/6/2023 1418    Comment: cotninued benefit of skilled PT services    Acceptance, E, VU,NR by KG1 at 3/3/2023 0846    Acceptance, E, VU by SJ at 3/1/2023 1623    Acceptance, E, NR by AS at 2/27/2023 0958    Acceptance, E, VU by CM at 2/24/2023 1449   Family Acceptance, E, VU by CM at 2/24/2023 1449                               User Key     Initials Effective Dates Name Provider Type Discipline    SJ 02/03/23 - 03/12/23 Alyssia Saini, PT Physical Therapist PT    AS 02/03/23 -  Danisha Rai, PTA Physical Therapist Assistant PT    KG1 05/22/20 -  Shanelle Roach, PT Physical Therapist PT    KG 01/04/23 -  Florence Pickett Physical Therapist PT    LO 06/16/21 -  Lynn Ferraro, PT Physical Therapist PT    KW 01/27/22 -  Hailey Zimmerman, PT Physical Therapist PT    AB 09/22/22 -  Danisha Mello, PT Physical Therapist PT    CM 09/22/22 -  Kiana Davis, PT Physical Therapist PT              PT Recommendation and Plan  Planned Therapy Interventions (PT): balance training, bed mobility training, gait training, home exercise program, postural re-education, stair training, strengthening, transfer training, motor coordination training, ROM (range of motion)  Plan of Care Reviewed With: patient  Progress: no change  Outcome Evaluation: Patient continues to be limited by generalized weakness, mild balance deficits, mildly unsteady gait, decreased activity tolerance, and remains below her functional baseline. Pt. ambulated in hallway w/ L platform  RW w/ improve stability; she does continue to fatigue and report onset dizziness w/ mobility (VSS). PT recommends L platform RW  and IP rehab at D/C.     Time Calculation:    PT Charges     Row Name 03/16/23 1334             Time Calculation    Start Time 0948  -MB      PT Received On 03/16/23  -MB      PT Goal Re-Cert Due Date 03/26/23  -MB         Time Calculation- PT    Total Timed Code Minutes- PT 36 minute(s)  -MB         Timed Charges    17211 - PT Therapeutic Exercise Minutes 16  -MB      76553 - Gait Training Minutes  20  -MB         Total Minutes    Timed Charges Total Minutes 36  -MB       Total Minutes 36  -MB            User Key  (r) = Recorded By, (t) = Taken By, (c) = Cosigned By    Initials Name Provider Type    Cierra Donato, PT Physical Therapist                  PT G-Codes  Outcome Measure Options: AM-PAC 6 Clicks Basic Mobility (PT)  AM-PAC 6 Clicks Score (PT): 18  AM-PAC 6 Clicks Score (OT): 20  Modified [place] Scale: 4 - Moderately severe disability.  Unable to walk without assistance, and unable to attend to own bodily needs without assistance.  PT Discharge Summary  Anticipated Discharge Disposition (PT): inpatient rehabilitation facility    Cierra Concepcion, PT  3/16/2023

## 2023-03-16 NOTE — PLAN OF CARE
Goal Outcome Evaluation:  Plan of Care Reviewed With: patient        Progress: no change  Outcome Evaluation: Patient continues to be limited by generalized weakness, mild balance deficits, mildly unsteady gait, decreased activity tolerance, and remains below her functional baseline. Pt. ambulated in hallway w/ L platform RW w/ improve stability; she does continue to fatigue and report onset dizziness w/ mobility (VSS). PT recommends L platform RW  and IP rehab at D/C.

## 2023-03-16 NOTE — CASE MANAGEMENT/SOCIAL WORK
Continued Stay Note  [facility]     Patient Name: Dorothy Zavala  MRN: [id number]  Today's Date: 3/16/2023    Admit Date: 2/23/2023    Plan: [facility] SNF, pending insurance auth   Discharge Plan     Row Name 03/16/23 1319       Plan    Plan Comments MSW followed up with pt at bedside. The plan had been for pt to potentially discharge to hot today as [facility] had no responded back about precert any further after trying to be reached again. Pt was provided [facility] housing, community, and hotel resources in [facility]. MSW has had several conversations with pt today about plan. Pt reports that she had still been hoping for rehab at [facility] and she does not feel like she is able to return to [facility] or anywhere on her own without some more rehab and physical therapy as she is still getting dizzy and unstable and does not feel safe or quite strong enough. MSW was able to call and get a hold of Tianna at [facility]. After receiving the updated notes from this MSW on Monday, Tianna reports that the [facility] staff report that they are unable to find anything that pt could be skilled on and so never started the precert. MSW discussed sending additional referrals with pt and pt was agreeable to this. MSW called referral to Sophie with [facility].               Discharge Codes    No documentation.               Expected Discharge Date and Time     Expected Discharge Date Expected Discharge Time    Mar 16, 2023             GRADENIA Menchaca

## 2023-03-16 NOTE — PLAN OF CARE
Goal Outcome Evaluation:  Plan of Care Reviewed With: patient        Progress: no change  Outcome Evaluation: Pt. rested well through the night, pt. c/o left wrist pain, PRN pain medication given for control of symptoms, no c/o N/V through the night, will continue to monitor

## 2023-03-16 NOTE — PROGRESS NOTES
[facility] Medicine Services  PROGRESS NOTE    Patient Name: Dorothy Zavala  : 1945  MRN: [id number]    Date of Admission: 2023  Primary Care Physician: Provider, No Known    Subjective   Subjective     CC:  weakness    HPI:  Patient very nervous about going home secondary to dizziness/lightheadedness. D/w case management and will try for rehab for now.  She reports breast mass started after PICC but is quite large. We discussed a PICC related mass here would be very strange.    ROS:  Gen- No fevers, chills  CV- No chest pain, palpitations  Resp- No cough, dyspnea  GI- No N/V/D, abd pain     Objective   Objective     Vital Signs:   Temp:  [97.8 °F (36.6 °C)-99 °F (37.2 °C)] 99 °F (37.2 °C)  Heart Rate:  [75-82] 78  Resp:  [20] 20  BP: (115-145)/(57-88) 143/88     Physical Exam:  Constitutional: No acute distress, awake, alert female sitting up in bed  HENT: NCAT, mucous membranes moist  Respiratory: Clear to auscultation bilaterally, respiratory effort normal   Cardiovascular: RRR, +systolic murmur III/VI present  Breast exam: 1.5 in x 1.5 in mass medial upper breast without redness or tenderness, feels matted  Gastrointestinal: Soft, nontender, nondistended  Musculoskeletal: Left wrist brace present. Muscle tone within normal limits, no joint effusions appreciated  Psychiatric: Appropriate affect, cooperative  Neurologic: Alert and oriented, facial movements symmetric and spontaneous movement of all 4 extremities grossly equal bilaterally, speech clear  Skin: No rashes    Results Reviewed:  LAB RESULTS:      Lab 03/10/23  1121   WBC 9.48   HEMOGLOBIN 13.1   HEMATOCRIT 42.3   PLATELETS 269   NEUTROS ABS 7.45*   IMMATURE GRANS (ABS) 0.04   LYMPHS ABS 1.20   MONOS ABS 0.64   EOS ABS 0.11   MCV 93.8         Lab 03/10/23  1121   SODIUM 139   POTASSIUM 4.1   CHLORIDE 102   CO2 27.0   ANION GAP 10.0   BUN 14   CREATININE 0.63   EGFR 92.6   GLUCOSE 119*   CALCIUM 9.1                          Brief Urine Lab Results  (Last result in the past 365 days)      Color   Clarity   Blood   Leuk Est   Nitrite   Protein   CREAT   Urine HCG        02/24/23 1449 Yellow   Cloudy   Negative   Negative   Negative   Trace                 Microbiology Results Abnormal     Procedure Component Value - Date/Time    Fungus Culture - Cerebrospinal Fluid, Lumbar Puncture [[id number]]  (Normal) Collected: 02/27/23 1246    Lab Status: Preliminary result Specimen: Cerebrospinal Fluid from Lumbar Puncture Updated: 03/13/23 1445     Fungus Culture No fungus isolated at 2 weeks    Culture, CSF - Cerebrospinal Fluid, Lumbar Puncture [[id number]] Collected: 02/27/23 1246    Lab Status: Final result Specimen: Cerebrospinal Fluid from Lumbar Puncture Updated: 03/02/23 1008     CSF Culture No growth at 3 days     Gram Stain Rare (1+) WBCs seen      No organisms seen    Blood Culture - Blood, Hand, Right [[id number]]  (Normal) Collected: 02/23/23 2122    Lab Status: Final result Specimen: Blood from Hand, Right Updated: 02/28/23 2146     Blood Culture No growth at 5 days    Narrative:      Aerobic bottle only      Blood Culture - Blood, Hand, Left [[id number]]  (Normal) Collected: 02/23/23 2122    Lab Status: Final result Specimen: Blood from Hand, Left Updated: 02/28/23 2146     Blood Culture No growth at 5 days    Narrative:      Aerobic bottle only      Meningitis / Encephalitis Panel, PCR - Cerebrospinal Fluid, Lumbar Puncture [[id number]]  (Normal) Collected: 02/27/23 1246    Lab Status: Final result Specimen: Cerebrospinal Fluid from Lumbar Puncture Updated: 02/27/23 1458     ESCHERICHIA COLI K1, PCR Not Detected     HAEMOPHILUS INFLUENZAE, PCR Not Detected     LISTERIA MONOCYTOGENES, PCR Not Detected     NEISSERIA MENINGITIDIS, PCR Not Detected     STREPTOCOCCUS AGALACTIAE, PCR Not Detected     STREPTOCOCCUS PNEUMONIAE, PCR Not Detected     CYTOMEGALOVIRUS (CMV), PCR Not Detected     ENTEROVIRUS, PCR Not Detected     HERPES SIMPLEX  VIRUS 1 (HSV-1), PCR Not Detected     HERPES SIMPLEX VIRUS 2 (HSV-2), PCR Not Detected     HUMAN PARECHOVIRUS, PCR Not Detected     VARICELLA ZOSTER VIRUS (VZV), PCR Not Detected     CRYPTOCOCCUS NEOFORMANS / GATTII, PCR Not Detected     HUMAN HERPES VIRUS 6 PCR Not Detected    Cryptococcal AG, CSF - Cerebrospinal Fluid, Lumbar Puncture [[id number]]  (Normal) Collected: 02/27/23 1246    Lab Status: Final result Specimen: Cerebrospinal Fluid from Lumbar Puncture Updated: 02/27/23 1435     Cryptococcal Antigen, CSF Negative          No radiology results from the last 24 hrs    Results for orders placed during the hospital encounter of 02/23/23    Adult Transthoracic Echo Complete W/ Cont if Necessary Per Protocol (With Agitated Saline)    Interpretation Summary  •  Left ventricular systolic function is normal. Calculated left ventricular EF = 67.2%  •  Left ventricular diastolic function was normal.  •  Saline test results are negative for right to left atrial level shunt.  •  Estimated right ventricular systolic pressure from tricuspid regurgitation is normal (<35 mmHg).      Current medications:  Scheduled Meds:aspirin, 81 mg, Oral, Daily   Or  aspirin, 300 mg, Rectal, Daily  atorvastatin, 80 mg, Oral, Nightly  levothyroxine, 100 mcg, Oral, Q AM  melatonin, 10 mg, Oral, Nightly  NIFEdipine XL, 30 mg, Oral, Q24H  nystatin, , Topical, Q12H  pantoprazole, 40 mg, Oral, Q AM  sertraline, 50 mg, Oral, Daily  sodium chloride, 10 mL, Intravenous, Q12H      Continuous Infusions:   PRN Meds:.•  hydrOXYzine  •  Magnesium Standard Dose Replacement - Initiate Nurse / BPA Driven Protocol  •  oxyCODONE-acetaminophen  •  Phosphorus Replacement - Initiate Nurse / BPA Driven Protocol  •  Potassium Replacement - Initiate Nurse / BPA Driven Protocol  •  sodium chloride  •  sodium chloride    Assessment & Plan   Assessment & Plan     Active Hospital Problems    Diagnosis  POA   • Hypertension [I10]  Yes   • Anxiety and depression [F41.9,  "F32.A]  Yes   • Hypothyroidism [E03.9]  Yes   • Arthritis [M19.90]  Yes   • COPD [J44.9]  Yes   • B-cell lymphoma  [C85.90]  Yes   • Frequent falls [R29.6]  Not Applicable   • Left wrist fracture [S62.102A]  Yes   • Illicit drug use (UDS + opiates, BZDs, and TCAs) [F19.90]  Yes      Resolved Hospital Problems    Diagnosis Date Resolved POA   • **Encephalopathy acute [G93.40] 03/16/2023 Yes        Brief Hospital Course to date:  Dorothy Zavala is a 77 y.o. 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 Stroke rule-out negative with imaging, LP also within normal limits, declining neurological status for some time with increased falls. Found to have left wrist fracture here from mechanical fall. Stay c/b complex discharge planning, prolonging stay, and right breast mass reportedly new to patient that will need follow-up.    Debility/Failure to Thrive  Frequent Mechanical Falls  -PT/OT with IPR recs, CM working to find rehab option for patient, applications now out  -stroke rule-out and meningitis w/u negative. Suspect progressive dementia state given more frequent falls and overall worsening     Depression  -started zoloft, d/c trazodone this admission per patient request  -will need OP f/u, likely contributor to decline     Right breast mass, 1.5x1.5\"  -CT chest with contrast revealed nonspecific infiltration within the right breast with no definite drainable fluid collection  -emphasized importance of outpatient mammography f/u imaging with patient: referral to breast imaging center or clear hand-off to PCP at d/c    Left Distal Radius Fracture 2/2 Mechanical Fall  - Dr Ramos f/u 1-2 weeks (~3/27) w repeat imaging here or OP  -Okay for weightbearing through elbow left upper extremity, otherwise nonweightbearing left upper extremity.      HTN -amlodipine d/c'd 2/2 patient s/e, nifedipine started  Hypothyroidism - continue " synthroid  UDS + benzo - script for opioid OP, but not benzos on review. Concern for illicit use?    D/w case management on this date - referrals currently out for rehab    Expected Discharge Location and Transportation: TBD logistics  Expected Discharge TBD logistics, medically ready  Expected Discharge Date and Time     Expected Discharge Date Expected Discharge Time    Mar 18, 2023            DVT prophylaxis:  Mechanical DVT prophylaxis orders are present.     AM-PAC 6 Clicks Score (PT): 18 (03/16/23 1336)    CODE STATUS:   Code Status and Medical Interventions:   Ordered at: 02/23/23 1517     Code Status (Patient has no pulse and is not breathing):    CPR (Attempt to Resuscitate)     Medical Interventions (Patient has pulse or is breathing):    Full Support       Dayna May MD  03/16/23

## 2023-03-16 NOTE — PROGRESS NOTES
Dorothy Day       LOS: 21 days   Patient Care Team:  Provider, No Known as PCP - General    Chief Complaint:  Left distal radius fracture    Subjective     Interval History:     Tolerable pain.    Review of Systems:      Insert short review of systems.    Objective     Vital Signs  Vital Signs (last 24 hours)       03/14 0700  03/15 0659 03/15 0700  03/16 0106   Most Recent      Temp (°F) 97.8 -  98.4    97.8 -  98.2     97.8 (36.6) 03/15 2242    Heart Rate 77 -  85    76 -  87     76 03/15 2242    Resp 18 -  20      20     20 03/15 2242    /72 -  145/78    133/93 -  176/73     145/83 03/15 2242    SpO2 (%) 92 -  95    92 -  93     93 03/15 2242            Physical Exam:     Mild tenderness left distal radius, no gross deformity     Results Review:     I reviewed the patient's new clinical results.    Medication Review:   Hospital Medications (active)       Dose Frequency Start End    aspirin chewable tablet 81 mg 81 mg Daily 2/23/2023     Admin Instructions: If patient fails dysphagia, WA option MUST be given.  Do not exceed 4 grams of aspirin in a 24 hr period.    If given for pain, use the following pain scale:   Mild Pain = Pain Score of 1-3, CPOT 1-2  Moderate Pain = Pain Score of 4-6, CPOT 3-4  Severe Pain = Pain Score of 7-10, CPOT 5-8    Route: Oral    Linked Group 1: See Hyperspace for full Linked Orders Report.        aspirin suppository 300 mg 300 mg Daily 2/23/2023     Admin Instructions: If patient fails dysphagia, WA option MUST be given.  Do not exceed 4 grams of aspirin in a 24 hr period.    If given for pain, use the following pain scale:   Mild Pain = Pain Score of 1-3, CPOT 1-2  Moderate Pain = Pain Score of 4-6, CPOT 3-4  Severe Pain = Pain Score of 7-10, CPOT 5-8    Route: Rectal    Linked Group 1: See Hyperspace for full Linked Orders Report.        atorvastatin (LIPITOR) tablet 80 mg 80 mg Nightly 2/23/2023     Admin Instructions: Avoid grapefruit juice.    Route: Oral    hydrOXYzine  "(ATARAX) tablet 25 mg 25 mg 2 Times Daily PRN 3/14/2023     Admin Instructions: Caution: Look alike/sound alike drug alert    Route: Oral    levothyroxine (SYNTHROID, LEVOTHROID) tablet 100 mcg 100 mcg Every Early Morning 2/24/2023     Admin Instructions: Take on empty stomach.    Route: Oral    Magnesium Standard Dose Replacement - Initiate Nurse / BPA Driven Protocol  As Needed 2/25/2023     Admin Instructions: Refer to attached \"S Electrolyte Replacement Protocol Algorithm\" for details.    Route: Does not apply    melatonin tablet 10 mg 10 mg Nightly 2/28/2023     Route: Oral    NIFEdipine XL (PROCARDIA XL) 24 hr tablet 30 mg 30 mg Every 24 Hours Scheduled 3/11/2023     Admin Instructions: Caution: Look alike/sound alike drug alert. Avoid grapefruit juice. Swallow whole. Do not crush, split or chew.    Route: Oral    nystatin (MYCOSTATIN) powder  Every 12 Hours Scheduled 3/11/2023     Route: Topical    oxyCODONE-acetaminophen (PERCOCET) 5-325 MG per tablet 1 tablet 1 tablet Every 4 Hours PRN 3/11/2023 3/18/2023    Admin Instructions: Based on patient request - if ordered for moderate or severe pain, provider allows for administration of a medication prescribed for a lower pain scale.  [HALI]    Do not exceed 4 grams of acetaminophen in a 24 hr period. Max dose of 2gm for AST/ALT greater than 120 units/L        If given for pain, use the following pain scale:   Mild Pain = Pain Score of 1-3, CPOT 1-2  Moderate Pain = Pain Score of 4-6, CPOT 3-4  Severe Pain = Pain Score of 7-10, CPOT 5-8    Route: Oral    pantoprazole (PROTONIX) EC tablet 40 mg 40 mg Every Early Morning 2/28/2023     Admin Instructions: Swallow whole; do not crush, split, or chew.    Route: Oral    Phosphorus Replacement - Initiate Nurse / BPA Driven Protocol  As Needed 2/25/2023     Admin Instructions: Refer to attached \"S Electrolyte Replacement Protocol Algorithm\" for details.    Route: Does not apply    Potassium Replacement - Initiate " "Nurse / BPA Driven Protocol  As Needed 2/25/2023     Admin Instructions: Refer to attached \"[facility] Electrolyte Replacement Protocol Algorithm\" for details.    Route: Does not apply    sertraline (ZOLOFT) tablet 50 mg 50 mg Daily 2/28/2023     Route: Oral    sodium chloride 0.9 % flush 10 mL 10 mL Every 12 Hours Scheduled 2/24/2023     Route: Intravenous    Cosign for Ordering: Accepted by Rob Alexandra MD on 2/24/2023  7:29 PM    sodium chloride 0.9 % flush 10 mL 10 mL As Needed 2/24/2023     Route: Intravenous    Cosign for Ordering: Accepted by Rob Alexandra MD on 2/24/2023  7:29 PM    sodium chloride 0.9 % infusion 40 mL 40 mL As Needed 2/23/2023     Admin Instructions: Following administration of an IV intermittent medication, flush line with 40mL NS at 100mL/hr.    Route: Intravenous            Assessment & Plan     77-year-old female with left distal radius fracture.      Encephalopathy acute    Suspected acute ischemic stroke     Hypertension    Anxiety and depression    Hypothyroidism    Arthritis    COPD    B-cell lymphoma     ? Subdural hematoma    Frequent falls    Left wrist fracture    Illicit drug use (UDS + opiates, BZDs, and TCAs)      Interval radiographs demonstrate neutral tilt, interval loss of radial height.  Plan to continue nonoperative management.  Repeat radiographs in 2 weeks in clinic or as inpatient if she is still admitted.        Nathaniel Ramos Jr, MD  03/16/23  01:06 EDT          "

## 2023-03-17 VITALS
HEART RATE: 98 BPM | SYSTOLIC BLOOD PRESSURE: 166 MMHG | BODY MASS INDEX: 33.08 KG/M2 | DIASTOLIC BLOOD PRESSURE: 79 MMHG | OXYGEN SATURATION: 93 % | HEIGHT: 68 IN | WEIGHT: 218.3 LBS | RESPIRATION RATE: 18 BRPM | TEMPERATURE: 97.6 F

## 2023-03-17 LAB — SARS-COV-2 AG RESP QL IA.RAPID: NORMAL

## 2023-03-17 PROCEDURE — 63710000001 ONDANSETRON PER 8 MG: Performed by: INTERNAL MEDICINE

## 2023-03-17 PROCEDURE — 99239 HOSP IP/OBS DSCHRG MGMT >30: CPT | Performed by: INTERNAL MEDICINE

## 2023-03-17 PROCEDURE — 97530 THERAPEUTIC ACTIVITIES: CPT

## 2023-03-17 PROCEDURE — 97535 SELF CARE MNGMENT TRAINING: CPT

## 2023-03-17 PROCEDURE — 87426 SARSCOV CORONAVIRUS AG IA: CPT | Performed by: INTERNAL MEDICINE

## 2023-03-17 RX ORDER — ONDANSETRON 4 MG/1
4 TABLET, FILM COATED ORAL EVERY 6 HOURS PRN
Status: DISCONTINUED | OUTPATIENT
Start: 2023-03-17 | End: 2023-03-17 | Stop reason: HOSPADM

## 2023-03-17 RX ORDER — ACETAMINOPHEN 325 MG/1
650 TABLET ORAL EVERY 6 HOURS PRN
Start: 2023-03-17

## 2023-03-17 RX ORDER — ACETAMINOPHEN 325 MG/1
650 TABLET ORAL EVERY 6 HOURS PRN
Status: DISCONTINUED | OUTPATIENT
Start: 2023-03-17 | End: 2023-03-17 | Stop reason: HOSPADM

## 2023-03-17 RX ORDER — ASPIRIN 81 MG/1
81 TABLET, CHEWABLE ORAL DAILY
Qty: 30 TABLET | Refills: 0 | Status: SHIPPED | OUTPATIENT
Start: 2023-03-17

## 2023-03-17 RX ORDER — OXYCODONE HYDROCHLORIDE AND ACETAMINOPHEN 5; 325 MG/1; MG/1
1 TABLET ORAL EVERY 6 HOURS PRN
Status: DISCONTINUED | OUTPATIENT
Start: 2023-03-17 | End: 2023-03-17 | Stop reason: HOSPADM

## 2023-03-17 RX ORDER — OXYCODONE HYDROCHLORIDE AND ACETAMINOPHEN 5; 325 MG/1; MG/1
1 TABLET ORAL EVERY 6 HOURS PRN
Qty: 8 TABLET | Refills: 0 | Status: SHIPPED | OUTPATIENT
Start: 2023-03-17 | End: 2023-03-19

## 2023-03-17 RX ORDER — NIFEDIPINE 30 MG/1
30 TABLET, FILM COATED, EXTENDED RELEASE ORAL
Qty: 30 TABLET | Refills: 0 | Status: SHIPPED | OUTPATIENT
Start: 2023-03-17 | End: 2023-04-05 | Stop reason: SDUPTHER

## 2023-03-17 RX ORDER — ACETAMINOPHEN 325 MG/1
650 TABLET ORAL EVERY 6 HOURS PRN
Status: DISCONTINUED | OUTPATIENT
Start: 2023-03-17 | End: 2023-03-17

## 2023-03-17 RX ORDER — ATORVASTATIN CALCIUM 80 MG/1
80 TABLET, FILM COATED ORAL NIGHTLY
Qty: 30 TABLET | Refills: 0 | Status: SHIPPED | OUTPATIENT
Start: 2023-03-17 | End: 2023-04-05 | Stop reason: SDUPTHER

## 2023-03-17 RX ADMIN — OXYCODONE HYDROCHLORIDE AND ACETAMINOPHEN 1 TABLET: 5; 325 TABLET ORAL at 14:49

## 2023-03-17 RX ADMIN — OXYCODONE HYDROCHLORIDE AND ACETAMINOPHEN 1 TABLET: 5; 325 TABLET ORAL at 09:02

## 2023-03-17 RX ADMIN — NIFEDIPINE 30 MG: 30 TABLET, FILM COATED, EXTENDED RELEASE ORAL at 09:02

## 2023-03-17 RX ADMIN — NYSTATIN: 100000 POWDER TOPICAL at 09:03

## 2023-03-17 RX ADMIN — ONDANSETRON HYDROCHLORIDE 4 MG: 4 TABLET, FILM COATED ORAL at 11:06

## 2023-03-17 RX ADMIN — LEVOTHYROXINE SODIUM 100 MCG: 0.1 TABLET ORAL at 05:15

## 2023-03-17 RX ADMIN — ASPIRIN 81 MG CHEWABLE TABLET 81 MG: 81 TABLET CHEWABLE at 09:02

## 2023-03-17 RX ADMIN — PANTOPRAZOLE SODIUM 40 MG: 40 TABLET, DELAYED RELEASE ORAL at 05:15

## 2023-03-17 RX ADMIN — SERTRALINE HYDROCHLORIDE 50 MG: 50 TABLET ORAL at 09:02

## 2023-03-17 NOTE — PROGRESS NOTES
[facility] Medicine Services  PROGRESS NOTE    Patient Name: Dorothy Zavala  : 1945  MRN: [id number]    Date of Admission: 2023  Primary Care Physician: Provider, No Known    Subjective   Subjective     CC:  weakness    HPI:  Anxiety about next steps and her placement, otherwise the same    ROS:  Gen- No fevers, chills  CV- No chest pain, palpitations  Resp- No cough, dyspnea    Objective   Objective     Vital Signs:   Temp:  [97.6 °F (36.4 °C)-98.1 °F (36.7 °C)] 97.6 °F (36.4 °C)  Heart Rate:  [79-98] 98  Resp:  [16-18] 18  BP: (137-166)/(79) 166/79     Physical Exam:  Constitutional: No acute distress, awake, alert female sitting up in bed  HENT: NCAT, mucous membranes moist  Respiratory: Clear to auscultation bilaterally, respiratory effort normal   Cardiovascular: RRR, +systolic murmur III/VI present  Gastrointestinal: Soft, nontender, nondistended  Musculoskeletal: Left wrist brace present. Muscle tone within normal limits, no joint effusions appreciated  Psychiatric: Mildly anxious, cooperative  Neurologic: Alert and oriented, facial movements symmetric and spontaneous movement of all 4 extremities grossly equal bilaterally, speech clear  Skin: No rashes    Results Reviewed:  LAB RESULTS:      Lab 03/10/23  1121   WBC 9.48   HEMOGLOBIN 13.1   HEMATOCRIT 42.3   PLATELETS 269   NEUTROS ABS 7.45*   IMMATURE GRANS (ABS) 0.04   LYMPHS ABS 1.20   MONOS ABS 0.64   EOS ABS 0.11   MCV 93.8         Lab 03/10/23  1121   SODIUM 139   POTASSIUM 4.1   CHLORIDE 102   CO2 27.0   ANION GAP 10.0   BUN 14   CREATININE 0.63   EGFR 92.6   GLUCOSE 119*   CALCIUM 9.1                         Brief Urine Lab Results  (Last result in the past 365 days)      Color   Clarity   Blood   Leuk Est   Nitrite   Protein   CREAT   Urine HCG        23 1449 Yellow   Cloudy   Negative   Negative   Negative   Trace                 Microbiology Results Abnormal     Procedure Component Value - Date/Time     Fungus Culture - Cerebrospinal Fluid, Lumbar Puncture [[id number]]  (Normal) Collected: 02/27/23 1246    Lab Status: Preliminary result Specimen: Cerebrospinal Fluid from Lumbar Puncture Updated: 03/13/23 1445     Fungus Culture No fungus isolated at 2 weeks    Culture, CSF - Cerebrospinal Fluid, Lumbar Puncture [[id number]] Collected: 02/27/23 1246    Lab Status: Final result Specimen: Cerebrospinal Fluid from Lumbar Puncture Updated: 03/02/23 1008     CSF Culture No growth at 3 days     Gram Stain Rare (1+) WBCs seen      No organisms seen    Blood Culture - Blood, Hand, Right [[id number]]  (Normal) Collected: 02/23/23 2122    Lab Status: Final result Specimen: Blood from Hand, Right Updated: 02/28/23 2146     Blood Culture No growth at 5 days    Narrative:      Aerobic bottle only      Blood Culture - Blood, Hand, Left [[id number]]  (Normal) Collected: 02/23/23 2122    Lab Status: Final result Specimen: Blood from Hand, Left Updated: 02/28/23 2146     Blood Culture No growth at 5 days    Narrative:      Aerobic bottle only      Meningitis / Encephalitis Panel, PCR - Cerebrospinal Fluid, Lumbar Puncture [[id number]]  (Normal) Collected: 02/27/23 1246    Lab Status: Final result Specimen: Cerebrospinal Fluid from Lumbar Puncture Updated: 02/27/23 1458     ESCHERICHIA COLI K1, PCR Not Detected     HAEMOPHILUS INFLUENZAE, PCR Not Detected     LISTERIA MONOCYTOGENES, PCR Not Detected     NEISSERIA MENINGITIDIS, PCR Not Detected     STREPTOCOCCUS AGALACTIAE, PCR Not Detected     STREPTOCOCCUS PNEUMONIAE, PCR Not Detected     CYTOMEGALOVIRUS (CMV), PCR Not Detected     ENTEROVIRUS, PCR Not Detected     HERPES SIMPLEX VIRUS 1 (HSV-1), PCR Not Detected     HERPES SIMPLEX VIRUS 2 (HSV-2), PCR Not Detected     HUMAN PARECHOVIRUS, PCR Not Detected     VARICELLA ZOSTER VIRUS (VZV), PCR Not Detected     CRYPTOCOCCUS NEOFORMANS / GATTII, PCR Not Detected     HUMAN HERPES VIRUS 6 PCR Not Detected    Cryptococcal AG, CSF -  Cerebrospinal Fluid, Lumbar Puncture [[id number]]  (Normal) Collected: 02/27/23 1246    Lab Status: Final result Specimen: Cerebrospinal Fluid from Lumbar Puncture Updated: 02/27/23 1435     Cryptococcal Antigen, CSF Negative          No radiology results from the last 24 hrs    Results for orders placed during the hospital encounter of 02/23/23    Adult Transthoracic Echo Complete W/ Cont if Necessary Per Protocol (With Agitated Saline)    Interpretation Summary  •  Left ventricular systolic function is normal. Calculated left ventricular EF = 67.2%  •  Left ventricular diastolic function was normal.  •  Saline test results are negative for right to left atrial level shunt.  •  Estimated right ventricular systolic pressure from tricuspid regurgitation is normal (<35 mmHg).      Current medications:  Scheduled Meds:aspirin, 81 mg, Oral, Daily   Or  aspirin, 300 mg, Rectal, Daily  atorvastatin, 80 mg, Oral, Nightly  levothyroxine, 100 mcg, Oral, Q AM  melatonin, 10 mg, Oral, Nightly  NIFEdipine XL, 30 mg, Oral, Q24H  nystatin, , Topical, Q12H  pantoprazole, 40 mg, Oral, Q AM  sertraline, 50 mg, Oral, Daily  sodium chloride, 10 mL, Intravenous, Q12H      Continuous Infusions:   PRN Meds:.•  acetaminophen  •  hydrOXYzine  •  Magnesium Standard Dose Replacement - Initiate Nurse / BPA Driven Protocol  •  ondansetron  •  oxyCODONE-acetaminophen  •  Phosphorus Replacement - Initiate Nurse / BPA Driven Protocol  •  Potassium Replacement - Initiate Nurse / BPA Driven Protocol  •  sodium chloride  •  sodium chloride    Assessment & Plan   Assessment & Plan     Active Hospital Problems    Diagnosis  POA   • Hypertension [I10]  Yes   • Anxiety and depression [F41.9, F32.A]  Yes   • Hypothyroidism [E03.9]  Yes   • Arthritis [M19.90]  Yes   • COPD [J44.9]  Yes   • B-cell lymphoma  [C85.90]  Yes   • Frequent falls [R29.6]  Not Applicable   • Left wrist fracture [S62.102A]  Yes   • Illicit drug use (UDS + opiates, BZDs, and TCAs)  "[F19.90]  Yes      Resolved Hospital Problems    Diagnosis Date Resolved POA   • **Encephalopathy acute [G93.40] 03/16/2023 Yes        Brief Hospital Course to date:  Dorothy Zavala is a 77 y.o. 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 Stroke rule-out negative with imaging, LP also within normal limits, declining neurological status for some time with increased falls. Found to have left wrist fracture here from mechanical fall. Stay c/b complex discharge planning, prolonging stay, and right breast mass reportedly new to patient that will need follow-up.    Debility/Failure to Thrive  Frequent Mechanical Falls  -PT/OT with IPR recs, CM working to find rehab option for patient but insurance difficulties  -stroke rule-out and meningitis w/u negative. Suspect progressive dementia state given more frequent falls and overall worsening     Depression  -started zoloft, d/c trazodone this admission per patient request  -will need OP f/u, likely contributor to decline     Right breast mass, 1.5x1.5\"  -CT chest with contrast revealed nonspecific infiltration within the right breast with no definite drainable fluid collection  -emphasized importance of outpatient mammography f/u imaging with patient: referral to breast imaging center or clear hand-off to PCP at d/c    Left Distal Radius Fracture 2/2 Mechanical Fall  - Dr Ramos f/u 1-2 weeks (~3/27) w repeat imaging here or OP  -Okay for weightbearing through elbow left upper extremity, otherwise nonweightbearing left upper extremity.      HTN -amlodipine d/c'd 2/2 patient s/e, nifedipine started  Hypothyroidism - continue synthroid  UDS + benzo - script for opioid OP, but not benzos on review. Concern for illicit use?    D/w case management on this date - referrals currently out for rehab, working on insurance logistics    Expected Discharge Location and Transportation: TBD " logistics  Expected Discharge TBD logistics, medically ready  Expected Discharge Date and Time     Expected Discharge Date Expected Discharge Time    Mar 18, 2023            DVT prophylaxis:  Mechanical DVT prophylaxis orders are present.     AM-PAC 6 Clicks Score (PT): 18 (03/16/23 2000)    CODE STATUS:   Code Status and Medical Interventions:   Ordered at: 02/23/23 1517     Code Status (Patient has no pulse and is not breathing):    CPR (Attempt to Resuscitate)     Medical Interventions (Patient has pulse or is breathing):    Full Support       Dayna May MD  03/17/23

## 2023-03-17 NOTE — PLAN OF CARE
Goal Outcome Evaluation:  Plan of Care Reviewed With: patient           Outcome Evaluation: VSS. Up w/one assist w/walker. Purewick in use. Adequate urine output. Left wrist bandage. Mild complaint of pain in left wrist. PRN percocet given. PRN Atarax given for restlessness/anxiety. Pt rested comfortably throughout the night. Care ongoing.

## 2023-03-17 NOTE — CASE MANAGEMENT/SOCIAL WORK
Case Management Discharge Note      Final Note: Pt to discharge today to [facility]. Pt's son at bedside and will provide transportation once pt ready. MSW confirmed with [place] pt has a Medicaid pending bed today at [facility]. Pt will need a rapid Covid test. MSW will fax discharge summary to [phone]. Number to call report is [phone]. Pt had also been delivered to bedside the rolling walker with the left wrist platform so she will have that to take with her as well. Pt had no further concerns.         Selected Continued Care - Admitted Since 2/23/2023     Destination Coordination complete.    Service Provider Selected Services Address Phone Fax Patient Preferred    [facility] [address], [place] [address] [phone] --          Durable Medical Equipment    No services have been selected for the patient.              Dialysis/Infusion    No services have been selected for the patient.              Home Medical Care    No services have been selected for the patient.              Therapy    No services have been selected for the patient.              Community Resources    No services have been selected for the patient.              Community & DME    No services have been selected for the patient.                       Final Discharge Disposition Code: 04 - intermediate care facility

## 2023-03-17 NOTE — THERAPY PROGRESS REPORT/RE-CERT
Patient Name: Dorothy Zavala  : 1945    MRN: [id number]                              Today's Date: 3/17/2023       Admit Date: 2023    Visit Dx:     ICD-10-CM ICD-9-CM   1. Cognitive communication deficit  R41.841 799.52   2. Encephalopathy acute  G93.40 348.30   3. Frequent falls  R29.6 V15.88   4. Suspected acute ischemic stroke   I63.9 434.91   5. ? Subdural hematoma  S06.5XAA 432.1   6. Closed fracture of left wrist, initial encounter  S62.102A 814.00     Patient Active Problem List   Diagnosis   • Hypertension   • Anxiety and depression   • Hypothyroidism   • Arthritis   • COPD   • B-cell lymphoma    • Frequent falls   • Left wrist fracture   • Illicit drug use (UDS + opiates, BZDs, and TCAs)     Past Medical History:   Diagnosis Date   • Anxiety and depression 2023   • Arthritis 2023   • B-cell lymphoma  2023   • COPD 2023   • Hypertension 2023   • Hypothyroidism 2023     History reviewed. No pertinent surgical history.   General Information     Row Name 23 1107          OT Time and Intention    Document Type progress note/recertification  -     Mode of Treatment occupational therapy;individual therapy  -     Row Name 23 1107          General Information    Patient Profile Reviewed yes  -MC     Existing Precautions/Restrictions fall;left;non-weight bearing;other (see comments)  LUE NWB, except may bear weight through elbow; in wrist splint AAT  -     Barriers to Rehab medically complex  -     Row Name 23 1107          Cognition    Orientation Status (Cognition) oriented x 3  -MC     Row Name 23 1107          Safety Issues, Functional Mobility    Safety Issues Affecting Function (Mobility) awareness of need for assistance;insight into deficits/self-awareness;safety precaution awareness  -     Impairments Affecting Function (Mobility) balance;strength;shortness of breath;endurance/activity tolerance;pain  -MC     Cognitive Impairments,  Mobility Safety/Performance awareness, need for assistance;insight into deficits/self-awareness;safety precaution awareness  -           User Key  (r) = Recorded By, (t) = Taken By, (c) = Cosigned By    Initials Name Provider Type    Patricia Ramirez OT Occupational Therapist                 Mobility/ADL's     Row Name 03/17/23 1107          Bed Mobility    Bed Mobility supine-sit;sit-supine  -     Supine-Sit [place] (Bed Mobility) modified independence  -     Sit-Supine [place] (Bed Mobility) modified independence  -     Bed Mobility, Safety Issues decreased use of arms for pushing/pulling  -     Assistive Device (Bed Mobility) bed rails;head of bed elevated  -     Comment, (Bed Mobility) Pt demo'd good ability to maintain NWB LUE w/ bed mobility.  -     Row Name 03/17/23 1107          Transfers    Transfers sit-stand transfer;stand-sit transfer;toilet transfer  -     Row Name 03/17/23 1107          Sit-Stand Transfer    Sit-Stand Starr (Transfers) contact guard;verbal cues  -     Assistive Device (Sit-Stand Transfers) walker, platform  -     Row Name 03/17/23 1107          Stand-Sit Transfer    Stand-Sit [place] (Transfers) contact guard;verbal cues  -     Assistive Device (Stand-Sit Transfers) walker, platform  -     Row Name 03/17/23 1107          Toilet Transfer    Type (Toilet Transfer) sit-stand;stand-sit  -     [place] Level (Toilet Transfer) contact guard;verbal cues  -     Assistive Device (Toilet Transfer) commode  -     Row Name 03/17/23 1107          Functional Mobility    Functional Mobility- Ind. Level verbal cues required;contact guard assist;1 person  -     Functional Mobility- Device walker, rolling platform  -     Functional Mobility-Distance (Feet) 100  -     Functional Mobility- Safety Issues balance decreased during turns  -     Functional Mobility- Comment Functional mobility limited d/t pt fatigue & c/o dizziness.  -      Row Name 03/17/23 1107          Activities of Daily Living    BADL Assessment/Intervention lower body dressing;grooming;toileting;upper body dressing  -[facility] Name 03/17/23 1107          Mobility    Extremity Weight-bearing Status left upper extremity  -     Left Upper Extremity (Weight-bearing Status) non weight-bearing (NWB)  Pt may bear weight through L elbow  -[facility] Name 03/17/23 1107          Lower Body Dressing Assessment/Training    [place] Level (Lower Body Dressing) don;socks;dependent (less than 25% patient effort);doff;independent  -     Position (Lower Body Dressing) edge of bed sitting  -     Comment, (Lower Body Dressing) Pt declined attempting to don socks herself, however, demo'd ability to doff socks independently.  -[facility] Name 03/17/23 1107          Upper Body Dressing Assessment/Training    [place] Level (Upper Body Dressing) don;doff;pajama/robe;minimum assist (75% patient effort);verbal cues  -     Position (Upper Body Dressing) edge of bed sitting  -[facility] Name 03/17/23 1107          Grooming Assessment/Training    [place] Level (Grooming) wash face, hands;contact guard assist  washing R hand only  -     Position (Grooming) sink side  -[facility] Name 03/17/23 1107          Toileting Assessment/Training    [place] Level (Toileting) adjust/manage clothing;perform perineal hygiene;supervision  -     Assistive Devices (Toileting) commode  -     Position (Toileting) supported sitting  -           User Key  (r) = Recorded By, (t) = Taken By, (c) = Cosigned By    Initials Name Provider Type     Patricia Alston OT Occupational Therapist               Obj/Interventions     [facility] Name 03/17/23 1111          Balance    Balance Assessment sitting static balance;sitting dynamic balance;sit to stand dynamic balance;standing static balance;standing dynamic balance  -     Static Sitting Balance independent  -     Dynamic Sitting Balance supervision   -     Position, Sitting Balance unsupported;sitting edge of bed;other (see comments)  commode  -     Sit to Stand Dynamic Balance contact guard;verbal cues  -     Static Standing Balance contact guard;verbal cues  -     Dynamic Standing Balance contact guard;verbal cues  -     Position/Device Used, Standing Balance supported;walker, platform  -     Balance Interventions sitting;sit to stand;occupation based/functional task  -           User Key  (r) = Recorded By, (t) = Taken By, (c) = Cosigned By    Initials Name Provider Type    Patricia Ramirez OT Occupational Therapist               Goals/Plan     Row Name 03/17/23 1113          Transfer Goal 1 (OT)    Progress/Outcome (Transfer Goal 1, OT) good progress toward goal  -     Row Name 03/17/23 1113          Dressing Goal 1 (OT)    Progress/Outcome (Dressing Goal 1, OT) good progress toward goal  -     Row Name 03/17/23 1113          Toileting Goal 1 (OT)    Progress/Outcome (Toileting Goal 1, OT) goal met  -     Row Name 03/17/23 1113          Grooming Goal 1 (OT)    Progress/Outcome (Grooming Goal 1, OT) good progress toward goal  -           User Key  (r) = Recorded By, (t) = Taken By, (c) = Cosigned By    Initials Name Provider Type    Patricia Ramirez OT Occupational Therapist               Clinical Impression     Row Name 03/17/23 1111          Pain Assessment    Pretreatment Pain Rating 8/10  -     Posttreatment Pain Rating 8/10  -     Pain Location - Side/Orientation Left  -     Pain Location generalized  -     Pain Location - wrist  -     Pain Intervention(s) Repositioned;Ambulation/increased activity;Elevated  -     Row Name 03/17/23 1111          Plan of Care Review    Plan of Care Reviewed With patient  -     Progress improving  -     Outcome Evaluation Pt continuing to progress toward OT goals. Pt's ADL independence continues to be limited d/t increased pain, decreased functional endurance, dizziness,  and mild balance deficits. Will continue to progress pt as tolerated per OT POC. Cont to rec SNF at d/c.  -     Row Name 03/17/23 1111          Therapy Assessment/Plan (OT)    Rehab Potential (OT) good, to achieve stated therapy goals  -     Criteria for Skilled Therapeutic Interventions Met (OT) yes;skilled treatment is necessary;meets criteria  -     Therapy Frequency (OT) daily  -     Row Name 03/17/23 1111          Therapy Plan Review/Discharge Plan (OT)    Anticipated Discharge Disposition (OT) skilled nursing facility  -     Row Name 03/17/23 1111          Vital Signs    Pre Systolic BP Rehab --  No tele, RN cleared OT  -     O2 Delivery Pre Treatment room air  -     O2 Delivery Intra Treatment room air  -     O2 Delivery Post Treatment room air  -     Pre Patient Position Supine  -     Intra Patient Position Standing  -     Post Patient Position Supine  -     Row Name 03/17/23 1111          Positioning and Restraints    Pre-Treatment Position in bed  -     Post Treatment Position bed  -     In Bed supine;call light within reach;encouraged to call for assist;exit alarm on;side rails up x3;LUE elevated  -           User Key  (r) = Recorded By, (t) = Taken By, (c) = Cosigned By    Initials Name Provider Type    Patricia Ramirez, OT Occupational Therapist               Outcome Measures     Row Name 03/17/23 1114          How much help from another is currently needed...    Putting on and taking off regular lower body clothing? 3  -MC     Bathing (including washing, rinsing, and drying) 3  -MC     Toileting (which includes using toilet bed pan or urinal) 3  -MC     Putting on and taking off regular upper body clothing 3  -MC     Taking care of personal grooming (such as brushing teeth) 4  -MC     Eating meals 4  -     AM-PAC 6 Clicks Score (OT) 20  -     Row Name 03/17/23 1114          Functional Assessment    Outcome Measure Options AM-PAC 6 Clicks Daily Activity (OT)  -            User Key  (r) = Recorded By, (t) = Taken By, (c) = Cosigned By    Initials Name Provider Type    Patricia Ramirez OT Occupational Therapist                Occupational Therapy Education     Title: PT OT SLP Therapies (In Progress)     Topic: Occupational Therapy (In Progress)     Point: ADL training (In Progress)     Description:   Instruct learner(s) on proper safety adaptation and remediation techniques during self care or transfers.   Instruct in proper use of assistive devices.              Learning Progress Summary           Patient Acceptance, E, NR by  at 3/17/2023 1114    Acceptance, E, VU by AN at 3/8/2023 1511    Acceptance, E, VU by MR at 3/7/2023 1315    Acceptance, E, VU by AN at 3/1/2023 1455    Acceptance, E, VU by AN at 2/28/2023 1025    Acceptance, E, NR by  at 2/24/2023 1425                   Point: Home exercise program (Done)     Description:   Instruct learner(s) on appropriate technique for monitoring, assisting and/or progressing therapeutic exercises/activities.              Learning Progress Summary           Patient Acceptance, E, VU by AN at 3/8/2023 1511    Acceptance, E, VU by MR at 3/7/2023 1315                   Point: Precautions (In Progress)     Description:   Instruct learner(s) on prescribed precautions during self-care and functional transfers.              Learning Progress Summary           Patient Acceptance, E, NR by MC at 3/17/2023 1114    Acceptance, E, VU by AN at 3/8/2023 1511    Acceptance, E, VU by MR at 3/7/2023 1315    Acceptance, E, VU by AN at 3/1/2023 1455    Acceptance, E, VU by AN at 2/28/2023 1025    Acceptance, E, NR by  at 2/24/2023 1425                   Point: Body mechanics (In Progress)     Description:   Instruct learner(s) on proper positioning and spine alignment during self-care, functional mobility activities and/or exercises.              Learning Progress Summary           Patient Acceptance, E, NR by  at 3/17/2023 1114     Acceptance, E, VU by AN at 3/8/2023 1511    Acceptance, E, VU by MR at 3/7/2023 1315    Acceptance, E, VU by AN at 3/1/2023 1455    Acceptance, E, VU by AN at 2/28/2023 1025    Acceptance, E, NR by CS at 2/24/2023 1425                               User Key     Initials Effective Dates Name Provider Type Discipline     09/02/21 -  Maci Flores, OT Occupational Therapist OT     10/14/22 -  Patricia Alston, OT Occupational Therapist OT    AN 09/21/21 -  Alejandra Jaimes, OT Occupational Therapist OT    MR 09/22/22 -  Sarah Collins, OT Occupational Therapist OT              OT Recommendation and Plan  Therapy Frequency (OT): daily  Plan of Care Review  Plan of Care Reviewed With: patient  Progress: improving  Outcome Evaluation: Pt continuing to progress toward OT goals. Pt's ADL independence continues to be limited d/t increased pain, decreased functional endurance, dizziness, and mild balance deficits. Will continue to progress pt as tolerated per OT POC. Cont to rec SNF at d/c.     Time Calculation:    Time Calculation- OT     Row Name 03/17/23 1114             Time Calculation- OT    OT Start Time 0947  -      OT Received On 03/17/23  -      OT Goal Re-Cert Due Date 03/27/23  -         Timed Charges    10263 - OT Therapeutic Activity Minutes 10  -MC      08293 - OT Self Care/Mgmt Minutes 13  -MC         Total Minutes    Timed Charges Total Minutes 23  -       Total Minutes 23  -MC            User Key  (r) = Recorded By, (t) = Taken By, (c) = Cosigned By    Initials Name Provider Type     Patricia Alston, OT Occupational Therapist              Therapy Charges for Today     Code Description Service Date Service Provider Modifiers Qty    78671644504 HC OT THERAPEUTIC ACT EA 15 MIN 3/17/2023 Patricia Alston OT GO 1    87399695032 HC OT SELF CARE/MGMT/TRAIN EA 15 MIN 3/17/2023 Patricia Alston OT GO 1               Patricia Alston OT  3/17/2023

## 2023-03-17 NOTE — PLAN OF CARE
Goal Outcome Evaluation:  Plan of Care Reviewed With: patient, son            Tolerating diet. Prns pain meds given before d/c. Stand by assistance. Son at bedside and provided transport to facility. Pt sent to facility w/ personal belongings. Pt understood d/c teachings, and verbalized understanding.

## 2023-03-17 NOTE — DISCHARGE PLACEMENT REQUEST
"Dorothy Garcia (77 y.o. Female)     Date of Birth   1945    Social Security Number       Address   [address] [place] [address]    Home Phone   [phone]    MRN   [id number]       Christianity   Unknown    Marital Status                               Admission Date   23    Admission Type   Urgent    Admitting Provider   Dayna May MD    Attending Provider   Dayna May MD    Department, Room/Bed   [facility] 5B, N542/1       Discharge Date       Discharge Disposition   Rehab Facility or Unit (DC - External)    Discharge Destination                               Attending Provider: Dayna May MD    Allergies: Penicillins    Isolation: None   Infection: COVID (rule out) (23)   Code Status: CPR    Ht: 172.7 cm (67.99\")   Wt: 99 kg (218 lb 4.8 oz)    Admission Cmt: None   Principal Problem: Encephalopathy acute [G93.40]                 Active Insurance as of 2023     Primary Coverage     Payor Plan Insurance Group Employer/Plan Group    ANTH MEDICARE REPLACEMENT ANTH MEDICARE ADVANTAGE [id number]     Payor Plan Address Payor Plan Phone Number Payor Plan Fax Number Effective Dates    [address] [phone]  2023 - None Entered    [facility] [address]       Subscriber Name Subscriber Birth Date Member ID       DOROTHY GARCIA 1945 [id number]                 Emergency Contacts      (Rel.) Home Phone Work Phone Mobile Phone    Asa Garcia (Son) -- -- [phone]    Yazan Chapman (Brother) -- -- [phone]               Discharge Summary      Dayna May MD at 23 1236              [facility] Medicine Services  DISCHARGE SUMMARY    Patient Name: Dorothy Garcia  : 1945  MRN: [id number]    Date of Admission: 2023  2:48 PM  Date of Discharge:  3/17  Primary Care Physician: Provider, No Known    Consults     Date and Time Order Name Status Description    3/13/2023  8:35 AM Inpatient " General Surgery Consult Completed     2/26/2023 10:55 AM Inpatient Orthopedic Surgery Consult Completed     2/26/2023  8:47 AM Inpatient Orthopedic Surgery Consult      2/23/2023  3:58 PM Inpatient Neurology Consult Stroke Completed           Hospital Course     Presenting Problem:   CVA (cerebral vascular accident) (HCC) [I63.9]  Encephalopathy acute [G93.40]    Active Hospital Problems    Diagnosis  POA   • Hypertension [I10]  Yes   • Anxiety and depression [F41.9, F32.A]  Yes   • Hypothyroidism [E03.9]  Yes   • Arthritis [M19.90]  Yes   • COPD [J44.9]  Yes   • B-cell lymphoma  [C85.90]  Yes   • Frequent falls [R29.6]  Not Applicable   • Left wrist fracture [S62.102A]  Yes   • Illicit drug use (UDS + opiates, BZDs, and TCAs) [F19.90]  Yes      Resolved Hospital Problems    Diagnosis Date Resolved POA   • **Encephalopathy acute [G93.40] 03/16/2023 Yes          Hospital Course:  Dorothy Zavala is a 77 y.o. 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 Stroke rule-out negative with imaging, LP also within normal limits, declining neurological status for some time with increased falls. Suspect some sort of dementia process with gradual decrease in function.     Found to have left wrist fracture here from mechanical fall. Non-op management with f/u with Dr Ramos in 2 weeks for repeat assessment. Okay for weightbearing through elbow left upper extremity, otherwise nonweightbearing left upper extremity.     Also found to have 1.5x1.5 in right medial superior breast mass reportedly new to patient that will need follow-up. I discussed w her in detail her need for bilateral mammogram after discharge, CM set up with PCP to help further f/u this needed follow-up     Discharged to rehab on 3/17 after prolonged stay 2/2 dispo challenges, now resolved    Discharge Follow Up Recommendations for outpatient labs/diagnostics:  F/u   Richard in 2 weeks  F/u PCP 1 week from rehab discharge: needs bilateral mammogram    Day of Discharge     HPI:   Anxiety about next steps and her placement, otherwise the same    Vital Signs:   Temp:  [97.6 °F (36.4 °C)-98.1 °F (36.7 °C)] 97.6 °F (36.4 °C)  Heart Rate:  [79-98] 98  Resp:  [16-18] 18  BP: (137-166)/(79) 166/79      Physical Exam:  Constitutional: No acute distress, awake, alert female sitting up in bed  HENT: NCAT, mucous membranes moist  Respiratory: Clear to auscultation bilaterally, respiratory effort normal   Cardiovascular: RRR, +systolic murmur III/VI present  Gastrointestinal: Soft, nontender, nondistended  Musculoskeletal: Left wrist brace present. Muscle tone within normal limits, no joint effusions appreciated  Psychiatric: Mildly anxious, cooperative  Neurologic: Alert and oriented, facial movements symmetric and spontaneous movement of all 4 extremities grossly equal bilaterally, speech clear  Skin: No rashes    Pertinent  and/or Most Recent Results     LAB RESULTS:                              Brief Urine Lab Results  (Last result in the past 365 days)      Color   Clarity   Blood   Leuk Est   Nitrite   Protein   CREAT   Urine HCG        02/24/23 1449 Yellow   Cloudy   Negative   Negative   Negative   Trace               Microbiology Results (last 10 days)     ** No results found for the last 240 hours. **          XR Wrist 3+ View Left    Result Date: 3/13/2023  XR WRIST 3+ VW LEFT Date of Exam: 3/13/2023 8:41 AM EDT Indication: repeat xrays to check alignment. Comparison: 2/25/2023 Findings: Again seen is fracture of the distal radius with minimally increased impaction compared to prior examination. There is evidence of bony bridging and callus formation though fracture line is still partially visible. Carpal alignment is unchanged joint space appears grossly preserved..     Impression: Healing distal radius fracture with minimally increased impaction compared to prior examination.  "Electronically Signed: Ang Dorman  3/13/2023 9:20 AM EDT  Workstation ID: [id number]    CT Chest With Contrast Diagnostic    Result Date: 3/11/2023  CT CHEST W CONTRAST DIAGNOSTIC Date of Exam: 3/11/2023 2:52 PM EST Indication: large \"golfball\" sized right breast nodule, concern for abscess vs mass. Comparison: Chest radiograph from February 24, 2023 Technique: Axial CT images were obtained of the chest after the uneventful intravenous administration of 75 mL Isovue-300.  Reconstructed coronal and sagittal images were also obtained. Automated exposure control and iterative construction methods were used. Findings: The central tracheobronchial tree is clear. There is some scarring at the right lung apex. There is no focal consolidation. There is no pleural effusion. The heart size appears normal. The great vessels are normal in caliber. There is no evidence of pulmonary embolus. No abnormally enlarged lymph nodes are identified. There is infiltration within the right breast. No definite drainable fluid collection is identified. Partial evaluation of the upper abdomen is unremarkable. No aggressive osseous lesions are identified. There is a mild chronic appearing compression forming at T4.     Impression: 1.Nonspecific infiltration within right breast. No definite drainable fluid collection identified. Recommend correlation with outpatient mammography workup. 2.No acute cardiopulmonary process. Electronically Signed: Nathaniel Watts  3/11/2023 5:00 PM EST  Workstation ID: [id number]              Results for orders placed during the hospital encounter of 02/23/23    Adult Transthoracic Echo Complete W/ Cont if Necessary Per Protocol (With Agitated Saline)    Interpretation Summary  •  Left ventricular systolic function is normal. Calculated left ventricular EF = 67.2%  •  Left ventricular diastolic function was normal.  •  Saline test results are negative for right to left atrial level shunt.  •  Estimated right " ventricular systolic pressure from tricuspid regurgitation is normal (<35 mmHg).      Plan for Follow-up of Pending Labs/Results: will set up f/u if positive  Pending Labs     Order Current Status    Fungus Culture - Cerebrospinal Fluid, Lumbar Puncture Preliminary result        Discharge Details        Discharge Medications      New Medications      Instructions Start Date   acetaminophen 325 MG tablet  Commonly known as: TYLENOL   650 mg, Oral, Every 6 Hours PRN      aspirin 81 MG chewable tablet   81 mg, Oral, Daily      atorvastatin 80 MG tablet  Commonly known as: LIPITOR   80 mg, Oral, Nightly      NIFEdipine CC 30 MG 24 hr tablet  Commonly known as: ADALAT CC   30 mg, Oral, Every 24 Hours Scheduled      sertraline 50 MG tablet  Commonly known as: ZOLOFT   50 mg, Oral, Daily         Changes to Medications      Instructions Start Date   oxyCODONE-acetaminophen 5-325 MG per tablet  Commonly known as: PERCOCET  What changed: reasons to take this   1 tablet, Oral, Every 6 Hours PRN         Continue These Medications      Instructions Start Date   levothyroxine 100 MCG tablet  Commonly known as: SYNTHROID, LEVOTHROID   1 tablet, Oral, Daily      omeprazole 40 MG capsule  Commonly known as: priLOSEC   1 capsule, Oral, Daily      promethazine 25 MG tablet  Commonly known as: PHENERGAN   25 mg, Oral         Stop These Medications    amitriptyline 100 MG tablet  Commonly known as: ELAVIL     calcium gluconate 500 MG tablet     furosemide 20 MG tablet  Commonly known as: LASIX     ibuprofen 800 MG tablet  Commonly known as: ADVIL,MOTRIN     Potassium Gluconate 2.5 MEQ tablet     traZODone 150 MG tablet  Commonly known as: DESYREL            Allergies   Allergen Reactions   • Penicillins Hives         Discharge Disposition:  Rehab Facility or Unit (DC - External)    Diet:  Hospital:  Diet Order   Procedures   • Diet: Cardiac Diets; Healthy Heart (2-3 Na+); Texture: Regular Texture (IDDSI 7); Fluid Consistency: Thin  (IDDSI 0)       Activity:Okay for weightbearing through elbow left upper extremity, otherwise nonweightbearing left upper extremity.       Restrictions or Other Recommendations:         CODE STATUS:    Code Status and Medical Interventions:   Ordered at: 02/23/23 1510     Code Status (Patient has no pulse and is not breathing):    CPR (Attempt to Resuscitate)     Medical Interventions (Patient has pulse or is breathing):    Full Support       No future appointments.    Additional Instructions for the Follow-ups that You Need to Schedule     Discharge Follow-up with PCP   As directed       Currently Documented PCP:    Provider, No Known    PCP Phone Number:    None     Follow Up Details: 1 week - please assist w scheduling         Discharge Follow-up with PCP   As directed       Currently Documented PCP:    Provider, No Known    PCP Phone Number:    None     Follow Up Details: 1 week from rehab discharge         Discharge Follow-[facility] with [facility] within 1 month   As directed      [facility] within 1 month         Discharge Follow-up with Specified Provider: Dr Ramos orthopedics 2 week f/u   As directed      To: Dr Ramos orthopedics 2 week f/u         Discharge Follow-up with Specified Provider: Dr Ramos orthopedics; 2 Weeks   As directed      To: Dr Ramos orthopedics    Follow Up: 2 Weeks    Follow Up Details: please make appt before dc                     Dayna May MD  03/17/23      Time Spent on Discharge:  I spent  35 minutes on this discharge activity which included: d/w bedside nurse and case management, face-to-face encounter with the patient, reviewing the data in the system, coordination of the care with the nursing staff as well as consultants, documentation, and entering orders.            Electronically signed by Dayna May MD at 03/17/23 4793

## 2023-03-17 NOTE — CASE MANAGEMENT/SOCIAL WORK
Continued Stay Note  [facility]     Patient Name: Dorothy Zavala  MRN: [id number]  Today's Date: 3/17/2023    Admit Date: 2/23/2023    Plan: [facility] SNF, pending insurance auth   Discharge Plan     Row Name 03/17/23 1304       Plan    Plan Comments MSW has had several conversations with pt today at bedside as well as pt's son, and facility laisons. Pt was offered a bed this AM at [facility] and was agreeable to start the precert with the facility. [facility] initiated precert with pt's insurance. MSW then received a call from Tianna with [facility] reporting that they want to offer pt a bed today, Medicaid pending. MSW spoke with pt and pt's son at bedside to provide this information. Tianna with [facility] also had spoken with pt. MSW explained all of the information about bed offers. [facility] was starting precert with pt's health insurance for them to cover rehab. [facility] is offering a Medicaid pending bed, without pt's current primary insurance covering and to apply for a long term medicaid after pt is at that facility for 30 days. Pt is aware of all of the information with the bed offers. Pt reports she would like to pursue and discharge today to [facility] Medicaid pending. MSW updated both facility liaisons and [facility] staff.    Final Discharge Disposition Code 04 - intermediate care facility               Discharge Codes    No documentation.               Expected Discharge Date and Time     Expected Discharge Date Expected Discharge Time    Mar 17, 2023             GARDENIA Menchaca

## 2023-03-17 NOTE — PLAN OF CARE
Goal Outcome Evaluation:  Plan of Care Reviewed With: patient        Progress: improving  Outcome Evaluation: Pt continuing to progress toward OT goals. Pt's ADL independence continues to be limited d/t increased pain, decreased functional endurance, dizziness, and mild balance deficits. Will continue to progress pt as tolerated per OT POC. Cont to rec SNF at d/c.

## 2023-03-17 NOTE — DISCHARGE SUMMARY
[facility] Medicine Services  DISCHARGE SUMMARY    Patient Name: Dorothy Zavala  : 1945  MRN: [id number]    Date of Admission: 2023  2:48 PM  Date of Discharge:  3/17  Primary Care Physician: Provider, No Known    Consults     Date and Time Order Name Status Description    3/13/2023  8:35 AM Inpatient General Surgery Consult Completed     2023 10:55 AM Inpatient Orthopedic Surgery Consult Completed     2023  8:47 AM Inpatient Orthopedic Surgery Consult      2023  3:58 PM Inpatient Neurology Consult Stroke Completed           Hospital Course     Presenting Problem:   CVA (cerebral vascular accident) (HCC) [I63.9]  Encephalopathy acute [G93.40]    Active Hospital Problems    Diagnosis  POA   • Hypertension [I10]  Yes   • Anxiety and depression [F41.9, F32.A]  Yes   • Hypothyroidism [E03.9]  Yes   • Arthritis [M19.90]  Yes   • COPD [J44.9]  Yes   • B-cell lymphoma  [C85.90]  Yes   • Frequent falls [R29.6]  Not Applicable   • Left wrist fracture [S62.102A]  Yes   • Illicit drug use (UDS + opiates, BZDs, and TCAs) [F19.90]  Yes      Resolved Hospital Problems    Diagnosis Date Resolved POA   • **Encephalopathy acute [G93.40] 2023 Yes          Hospital Course:  Dorothy Zavala is a 77 y.o. female w low grade B-cell lymphoma (found on gastric biopsy ), current recurrent falls who presented as OSH transfer for stroke rule-out. Stroke rule-out negative with imaging, LP also within normal limits, declining neurological status for some time with increased falls. Suspect some sort of dementia process with gradual decrease in function.     Found to have left wrist fracture here from mechanical fall. Non-op management with f/u with Dr Ramos in 2 weeks for repeat assessment. Okay for weightbearing through elbow left upper extremity, otherwise nonweightbearing left upper extremity.     Also found to have 1.5x1.5 in right medial superior breast mass reportedly new to  patient that will need follow-up. I discussed w her in detail her need for bilateral mammogram after discharge, CM set up with PCP to help further f/u this needed follow-up     Discharged to rehab on 3/17 after prolonged stay 2/2 dispo challenges, now resolved    Discharge Follow Up Recommendations for outpatient labs/diagnostics:  F/u Dr Ramos in 2 weeks  F/u PCP 1 week from rehab discharge: needs bilateral mammogram    Day of Discharge     HPI:   Anxiety about next steps and her placement, otherwise the same    Vital Signs:   Temp:  [97.6 °F (36.4 °C)-98.1 °F (36.7 °C)] 97.6 °F (36.4 °C)  Heart Rate:  [79-98] 98  Resp:  [16-18] 18  BP: (137-166)/(79) 166/79      Physical Exam:  Constitutional: No acute distress, awake, alert female sitting up in bed  HENT: NCAT, mucous membranes moist  Respiratory: Clear to auscultation bilaterally, respiratory effort normal   Cardiovascular: RRR, +systolic murmur III/VI present  Gastrointestinal: Soft, nontender, nondistended  Musculoskeletal: Left wrist brace present. Muscle tone within normal limits, no joint effusions appreciated  Psychiatric: Mildly anxious, cooperative  Neurologic: Alert and oriented, facial movements symmetric and spontaneous movement of all 4 extremities grossly equal bilaterally, speech clear  Skin: No rashes    Pertinent  and/or Most Recent Results     LAB RESULTS:                              Brief Urine Lab Results  (Last result in the past 365 days)      Color   Clarity   Blood   Leuk Est   Nitrite   Protein   CREAT   Urine HCG        02/24/23 1449 Yellow   Cloudy   Negative   Negative   Negative   Trace               Microbiology Results (last 10 days)     ** No results found for the last 240 hours. **          XR Wrist 3+ View Left    Result Date: 3/13/2023  XR WRIST 3+ VW LEFT Date of Exam: 3/13/2023 8:41 AM EDT Indication: repeat xrays to check alignment. Comparison: 2/25/2023 Findings: Again seen is fracture of the distal radius with minimally  "increased impaction compared to prior examination. There is evidence of bony bridging and callus formation though fracture line is still partially visible. Carpal alignment is unchanged joint space appears grossly preserved..     Impression: Healing distal radius fracture with minimally increased impaction compared to prior examination. Electronically Signed: Ang Dorman  3/13/2023 9:20 AM EDT  Workstation ID: [id number]    CT Chest With Contrast Diagnostic    Result Date: 3/11/2023  CT CHEST W CONTRAST DIAGNOSTIC Date of Exam: 3/11/2023 2:52 PM EST Indication: large \"golfball\" sized right breast nodule, concern for abscess vs mass. Comparison: Chest radiograph from February 24, 2023 Technique: Axial CT images were obtained of the chest after the uneventful intravenous administration of 75 mL Isovue-300.  Reconstructed coronal and sagittal images were also obtained. Automated exposure control and iterative construction methods were used. Findings: The central tracheobronchial tree is clear. There is some scarring at the right lung apex. There is no focal consolidation. There is no pleural effusion. The heart size appears normal. The great vessels are normal in caliber. There is no evidence of pulmonary embolus. No abnormally enlarged lymph nodes are identified. There is infiltration within the right breast. No definite drainable fluid collection is identified. Partial evaluation of the upper abdomen is unremarkable. No aggressive osseous lesions are identified. There is a mild chronic appearing compression forming at T4.     Impression: 1.Nonspecific infiltration within right breast. No definite drainable fluid collection identified. Recommend correlation with outpatient mammography workup. 2.No acute cardiopulmonary process. Electronically Signed: Nathaniel Watts  3/11/2023 5:00 PM EST  Workstation ID: [id number]              Results for orders placed during the hospital encounter of 02/23/23    Adult " Transthoracic Echo Complete W/ Cont if Necessary Per Protocol (With Agitated Saline)    Interpretation Summary  •  Left ventricular systolic function is normal. Calculated left ventricular EF = 67.2%  •  Left ventricular diastolic function was normal.  •  Saline test results are negative for right to left atrial level shunt.  •  Estimated right ventricular systolic pressure from tricuspid regurgitation is normal (<35 mmHg).      Plan for Follow-up of Pending Labs/Results: will set up f/u if positive  Pending Labs     Order Current Status    Fungus Culture - Cerebrospinal Fluid, Lumbar Puncture Preliminary result        Discharge Details        Discharge Medications      New Medications      Instructions Start Date   acetaminophen 325 MG tablet  Commonly known as: TYLENOL   650 mg, Oral, Every 6 Hours PRN      aspirin 81 MG chewable tablet   81 mg, Oral, Daily      atorvastatin 80 MG tablet  Commonly known as: LIPITOR   80 mg, Oral, Nightly      NIFEdipine CC 30 MG 24 hr tablet  Commonly known as: ADALAT CC   30 mg, Oral, Every 24 Hours Scheduled      sertraline 50 MG tablet  Commonly known as: ZOLOFT   50 mg, Oral, Daily         Changes to Medications      Instructions Start Date   oxyCODONE-acetaminophen 5-325 MG per tablet  Commonly known as: PERCOCET  What changed: reasons to take this   1 tablet, Oral, Every 6 Hours PRN         Continue These Medications      Instructions Start Date   levothyroxine 100 MCG tablet  Commonly known as: SYNTHROID, LEVOTHROID   1 tablet, Oral, Daily      omeprazole 40 MG capsule  Commonly known as: priLOSEC   1 capsule, Oral, Daily      promethazine 25 MG tablet  Commonly known as: PHENERGAN   25 mg, Oral         Stop These Medications    amitriptyline 100 MG tablet  Commonly known as: ELAVIL     calcium gluconate 500 MG tablet     furosemide 20 MG tablet  Commonly known as: LASIX     ibuprofen 800 MG tablet  Commonly known as: ADVIL,MOTRIN     Potassium Gluconate 2.5 MEQ tablet      traZODone 150 MG tablet  Commonly known as: DESYREL            Allergies   Allergen Reactions   • Penicillins Hives         Discharge Disposition:  Rehab Facility or Unit (DC - External)    Diet:  Hospital:  Diet Order   Procedures   • Diet: Cardiac Diets; Healthy Heart (2-3 Na+); Texture: Regular Texture (IDDSI 7); Fluid Consistency: Thin (IDDSI 0)       Activity:Okay for weightbearing through elbow left upper extremity, otherwise nonweightbearing left upper extremity.       Restrictions or Other Recommendations:         CODE STATUS:    Code Status and Medical Interventions:   Ordered at: 02/23/23 1517     Code Status (Patient has no pulse and is not breathing):    CPR (Attempt to Resuscitate)     Medical Interventions (Patient has pulse or is breathing):    Full Support       No future appointments.    Additional Instructions for the Follow-ups that You Need to Schedule     Discharge Follow-up with PCP   As directed       Currently Documented PCP:    Provider, No Known    PCP Phone Number:    None     Follow Up Details: 1 week - please assist w scheduling         Discharge Follow-up with PCP   As directed       Currently Documented PCP:    Provider, No Known    PCP Phone Number:    None     Follow Up Details: 1 week from rehab discharge         Discharge Follow-up with [facility] within 1 month   As directed      [facility] within 1 month         Discharge Follow-up with Specified Provider: Dr Ramos orthopedics 2 week f/u   As directed      To: Dr Ramos orthopedics 2 week f/u         Discharge Follow-up with Specified Provider: Dr Ramos orthopedics; 2 Weeks   As directed      To: Dr Ramos orthopedics    Follow Up: 2 Weeks    Follow Up Details: please make appt before dc                     Dayna May MD  03/17/23      Time Spent on Discharge:  I spent  35 minutes on this discharge activity which included: d/w bedside nurse and case management,  face-to-face encounter with the patient, reviewing the data in the system, coordination of the care with the nursing staff as well as consultants, documentation, and entering orders.

## 2023-03-18 NOTE — PROGRESS NOTES
"Enter Query Response Below      Query Response:   Most likely dementia only             If applicable, please update the problem list.   Patient: Dorothy Zavala        : 1945  Account: [id number]           Admit Date: 2023        How to Respond to this query:       a. Click New Note     b. Answer query within the yellow box.                c. Update the Problem List, if applicable.      If you have any questions about this query contact me at: [email or web address]     Dr. May,    76 yo female admitted 23 for \"acute encephalopathy on possible underlying dementia and possible stroke\" per History and Physical.   Neurology consulted noted 23, \" Acute encephalopathy, improved. Suspect substance withdrawal.\"   MRI 23 \"No evidence for acute or subacute focal ischemia. No evidence for abnormal focal enhancement or abnormal enhancing mass. Diffuse nonspecific white matter signal changes are noted with associated diffuse volume loss. These findings are most consistent with chronic small vessel ischemic changes or age-related changes.\"   Urine drug screen reported 23 \"detected for benzodiazepine, oxycodone, and tricyclic antidepressants.\"   Discharge summary 3/17/23 \"stroke rule out, declining neurology status for some time.  Suspect some sort of dementia process with gradual decrease in function.  Discharged to rehab.    After study can encephalopathy be specified as     Acute encephalopathy with underlying dementia due to withdrawal (please specify substance(s)  Acute encephalopathy with underlying dementia due to other (please specify)_________  Dementia only  Other (please specify)__________  Unable to clarify    By submitting this query, we are merely seeking further clarification of documentation to accurately reflect all conditions that you are monitoring, evaluating, treating or that extend the hospitalization or utilize additional resources of care. Please utilize your independent " clinical judgment when addressing the question(s) above.     This query and your response, once completed, will be entered into the legal medical record.    Sincerely,  Meme CARRANZA RN CCDS  System Director Clinical Documentation Integrity Program

## 2023-04-05 ENCOUNTER — OFFICE VISIT (OUTPATIENT)
Dept: INTERNAL MEDICINE | Facility: CLINIC | Age: 78
End: 2023-04-05
Payer: MEDICARE

## 2023-04-05 VITALS
OXYGEN SATURATION: 98 % | DIASTOLIC BLOOD PRESSURE: 68 MMHG | HEART RATE: 94 BPM | TEMPERATURE: 97.3 F | WEIGHT: 213 LBS | SYSTOLIC BLOOD PRESSURE: 132 MMHG | HEIGHT: 68 IN | BODY MASS INDEX: 32.28 KG/M2

## 2023-04-05 DIAGNOSIS — E78.2 MIXED HYPERLIPIDEMIA: ICD-10-CM

## 2023-04-05 DIAGNOSIS — F33.0 MILD EPISODE OF RECURRENT MAJOR DEPRESSIVE DISORDER: ICD-10-CM

## 2023-04-05 DIAGNOSIS — I10 ESSENTIAL HYPERTENSION: ICD-10-CM

## 2023-04-05 DIAGNOSIS — F51.01 PRIMARY INSOMNIA: Primary | ICD-10-CM

## 2023-04-05 DIAGNOSIS — E03.9 ACQUIRED HYPOTHYROIDISM: ICD-10-CM

## 2023-04-05 RX ORDER — NIFEDIPINE 30 MG/1
30 TABLET, FILM COATED, EXTENDED RELEASE ORAL
Qty: 30 TABLET | Refills: 10 | Status: SHIPPED | OUTPATIENT
Start: 2023-04-05

## 2023-04-05 RX ORDER — ATORVASTATIN CALCIUM 80 MG/1
80 TABLET, FILM COATED ORAL NIGHTLY
Qty: 30 TABLET | Refills: 10 | Status: SHIPPED | OUTPATIENT
Start: 2023-04-05 | End: 2023-04-27 | Stop reason: SDUPTHER

## 2023-04-05 RX ORDER — ZOLPIDEM TARTRATE 6.25 MG/1
6.25 TABLET, FILM COATED, EXTENDED RELEASE ORAL NIGHTLY PRN
Qty: 30 TABLET | Refills: 1 | Status: SHIPPED | OUTPATIENT
Start: 2023-04-05 | End: 2023-04-13 | Stop reason: SDUPTHER

## 2023-04-05 RX ORDER — HYDROXYZINE 50 MG/1
TABLET, FILM COATED ORAL
COMMUNITY
Start: 2023-03-27

## 2023-04-05 RX ORDER — ZOLPIDEM TARTRATE 12.5 MG/1
TABLET, FILM COATED, EXTENDED RELEASE ORAL
COMMUNITY
End: 2023-04-05 | Stop reason: SDUPTHER

## 2023-04-05 NOTE — PROGRESS NOTES
Office Note      Date: 2023  Patient Name: Dorothy Zavala  MRN: [id number]  : 1945    Chief Complaint   Patient presents with   • Hospital Follow Up Visit       History of Present Illness: Dorothy Zavala is a 77 y.o. female who presents for Hospital Follow Up Visit. Wants to establish care at . Has difficulty falling and staying asleep. Hydroxyzine does not help. ambien helped while in the hospital.   Has mammogram ordered scheduled for tomorrow.   Needs refills.   Subjective      Review of Systems:   Pertinent review of systems per HPI.    Review of Systems   Constitutional: Negative for activity change, appetite change, chills, diaphoresis, fatigue, fever and unexpected weight change.   HENT: Negative for congestion, dental problem, drooling, ear discharge, ear pain, facial swelling, hearing loss and mouth sores.    Eyes: Negative for pain, discharge and itching.   Respiratory: Negative for apnea, cough, choking, chest tightness and shortness of breath.    Cardiovascular: Negative for chest pain, palpitations and leg swelling.   Gastrointestinal: Negative for abdominal distention, abdominal pain, blood in stool, constipation and diarrhea.   Endocrine: Negative for cold intolerance, heat intolerance, polydipsia and polyuria.   Genitourinary: Negative for difficulty urinating, dysuria, frequency and hematuria.   Skin: Negative for color change, pallor, rash and wound.   Allergic/Immunologic: Negative for environmental allergies, food allergies and immunocompromised state.   Neurological: Negative for dizziness, weakness and light-headedness.   Psychiatric/Behavioral: Negative for agitation, behavioral problems, confusion, decreased concentration and self-injury. The patient is not nervous/anxious.    All other systems reviewed and are negative.    Allergies   Allergen Reactions   • Penicillins Hives and Rash       Objective     Physical Exam:  Vital Signs:   Vitals:    23 1112   BP: 132/68  "  Pulse: 94   Temp: 97.3 °F (36.3 °C)   SpO2: 98%   Weight: 96.6 kg (213 lb)   Height: 172.7 cm (67.99\")   PainSc:   5   PainLoc: Breast  Comment: Right      Body mass index is 32.4 kg/m².    Physical Exam  Vitals and nursing note reviewed.   Constitutional:       General: She is not in acute distress.     Appearance: She is well-developed.   HENT:      Head: Normocephalic and atraumatic.      Right Ear: External ear normal.      Left Ear: External ear normal.   Eyes:      General: No scleral icterus.        Right eye: No discharge.         Left eye: No discharge.      Conjunctiva/sclera: Conjunctivae normal.   Cardiovascular:      Rate and Rhythm: Normal rate and regular rhythm.      Heart sounds: Normal heart sounds. No murmur heard.    No friction rub. No gallop.   Pulmonary:      Effort: Pulmonary effort is normal. No respiratory distress.      Breath sounds: Normal breath sounds. No wheezing or rales.   Skin:     General: Skin is warm and dry.      Coloration: Skin is not pale.         Assessment / Plan      Assessment & Plan:    1. Primary insomnia  Chronic med issue, rx for ambien. Will need to review prior records before inc if needed due to documentation of frequent falls.     2. Acquired hypothyroidism  Chronic med issue, cont synthroid    3. Mixed hyperlipidemia  Chronic med issue, cont lipitor  - atorvastatin (LIPITOR) 80 MG tablet; Take 1 tablet by mouth Every Night for 30 days.  Dispense: 30 tablet; Refill: 10    4. Mild episode of recurrent major depressive disorder  Chronic med issue and stable. Refills given  - sertraline (ZOLOFT) 50 MG tablet; Take 1 tablet by mouth Daily.  Dispense: 30 tablet; Refill: 5    5. Essential hypertension  Chronic med issue and stable. Refills given  - NIFEdipine CC (ADALAT CC) 30 MG 24 hr tablet; Take 1 tablet by mouth Daily.  Dispense: 30 tablet; Refill: 10      Ivis Sparks MD  04/05/2023   "

## 2023-04-10 LAB — FUNGUS WND CULT: NORMAL

## 2023-04-13 ENCOUNTER — TELEPHONE (OUTPATIENT)
Dept: INTERNAL MEDICINE | Facility: CLINIC | Age: 78
End: 2023-04-13
Payer: MEDICARE

## 2023-04-13 DIAGNOSIS — F51.01 PRIMARY INSOMNIA: ICD-10-CM

## 2023-04-13 RX ORDER — ZOLPIDEM TARTRATE 6.25 MG/1
6.25 TABLET, FILM COATED, EXTENDED RELEASE ORAL NIGHTLY PRN
Qty: 30 TABLET | Refills: 1 | Status: SHIPPED | OUTPATIENT
Start: 2023-04-13

## 2023-04-13 NOTE — TELEPHONE ENCOUNTER
Caller: Dorothy Zavala    Relationship: Self    Best call back number: [phone]    What medications are you currently taking:   Current Outpatient Medications on File Prior to Visit   Medication Sig Dispense Refill   • acetaminophen (TYLENOL) 325 MG tablet Take 2 tablets by mouth Every 6 (Six) Hours As Needed for Mild Pain or Moderate Pain.     • aspirin 81 MG chewable tablet Chew 1 tablet Daily. 30 tablet 0   • atorvastatin (LIPITOR) 80 MG tablet Take 1 tablet by mouth Every Night for 30 days. 30 tablet 10   • hydrOXYzine (ATARAX) 50 MG tablet TAKE 1 TABLET (50 MG TOTAL) BY MOUTH IN THE MORNING     • levothyroxine (SYNTHROID, LEVOTHROID) 100 MCG tablet Take 1 tablet by mouth Daily.     • NIFEdipine CC (ADALAT CC) 30 MG 24 hr tablet Take 1 tablet by mouth Daily. 30 tablet 10   • omeprazole (priLOSEC) 40 MG capsule Take 1 capsule by mouth Daily.     • promethazine (PHENERGAN) 25 MG tablet Take 1 tablet by mouth.     • sertraline (ZOLOFT) 50 MG tablet Take 1 tablet by mouth Daily. 30 tablet 5   • zolpidem CR (AMBIEN CR) 6.25 MG CR tablet Take 1 tablet by mouth At Night As Needed for Sleep. 30 tablet 1     No current facility-administered medications on file prior to visit.              Which medication are you concerned about: SAMPSON    Who prescribed you this medication: DOCTOR TATUM     What are your concerns: THE PATIENT WAS SEEN ON 04/05/2023 SHE STATES THAT THE DOCTOR WAS GOING TO SEND IN A PRESCRIPTION FOR AMBIEN TO THE [facility] THE [facility] STATES THAT THEY DO NOT HAVE THE PRESCRIPTION PLEASE CALL PATIENT TO LET HER KNOW IF THAT WAS SENT IN

## 2023-04-24 DIAGNOSIS — F51.01 PRIMARY INSOMNIA: ICD-10-CM

## 2023-04-24 RX ORDER — ZOLPIDEM TARTRATE 6.25 MG/1
6.25 TABLET, FILM COATED, EXTENDED RELEASE ORAL NIGHTLY PRN
Qty: 30 TABLET | Refills: 0 | Status: SHIPPED | OUTPATIENT
Start: 2023-04-24

## 2023-04-24 NOTE — TELEPHONE ENCOUNTER
Caller: Dorothy Zavala    Relationship: Self    Best call back number: [phone]    Requested Prescriptions:   Requested Prescriptions     Pending Prescriptions Disp Refills   • zolpidem CR (AMBIEN CR) 6.25 MG CR tablet 30 tablet 1     Sig: Take 1 tablet by mouth At Night As Needed for Sleep.        Pharmacy where request should be sent: [facility]/PHARMACY #[id number] - [place], [place] - [address] [place] [address] AT [place] [address] - [phone] [facility] [phone]      Last office visit with prescribing clinician: 4/5/2023   Last telemedicine visit with prescribing clinician: 7/7/2023   Next office visit with prescribing clinician: 7/7/2023     Additional details provided by patient: PATIENT STATES THAT SHE NEEDS THE 12.5MG CALLED IN DUE TO THE 6.25MG NOT WORKING.    Does the patient have less than a 3 day supply:  [x] Yes  [] No    Would you like a call back once the refill request has been completed: [x] Yes [] No    If the office needs to give you a call back, can they leave a voicemail: [x] Yes [] No    Esteban Schmitz Rep   04/24/23 14:34 EDT

## 2023-04-27 DIAGNOSIS — E78.2 MIXED HYPERLIPIDEMIA: ICD-10-CM

## 2023-04-27 DIAGNOSIS — F51.01 PRIMARY INSOMNIA: ICD-10-CM

## 2023-04-27 RX ORDER — ZOLPIDEM TARTRATE 6.25 MG/1
6.25 TABLET, FILM COATED, EXTENDED RELEASE ORAL NIGHTLY PRN
Qty: 30 TABLET | Refills: 0 | OUTPATIENT
Start: 2023-04-27

## 2023-04-27 RX ORDER — ATORVASTATIN CALCIUM 80 MG/1
80 TABLET, FILM COATED ORAL NIGHTLY
Qty: 30 TABLET | Refills: 3 | Status: SHIPPED | OUTPATIENT
Start: 2023-04-27 | End: 2023-05-27

## 2023-04-27 NOTE — TELEPHONE ENCOUNTER
Caller: Dorothy Zavala    Relationship: Self    Best call back number: [phone]    Requested Prescriptions:   Requested Prescriptions     Pending Prescriptions Disp Refills   • atorvastatin (LIPITOR) 80 MG tablet 30 tablet 10     Sig: Take 1 tablet by mouth Every Night for 30 days.   • zolpidem CR (AMBIEN CR) 6.25 MG CR tablet 30 tablet 0     Sig: Take 1 tablet by mouth At Night As Needed for Sleep.      Pharmacy where request should be sent: [facility]/PHARMACY #[id number] [place] [address] - [phone] [facility] [phone] FX     Last office visit with prescribing clinician: 4/5/2023   Last telemedicine visit with prescribing clinician: 7/7/2023   Next office visit with prescribing clinician: 7/7/2023     Does the patient have less than a 3 day supply:  [x] Yes  [] No    Would you like a call back once the refill request has been completed: [x] Yes [] No    If the office needs to give you a call back, can they leave a voicemail: [x] Yes [] No    Esteban Rodriguez Rep   04/27/23 12:17 EDT

## 2023-05-18 DIAGNOSIS — F33.0 MILD EPISODE OF RECURRENT MAJOR DEPRESSIVE DISORDER: ICD-10-CM

## 2023-05-18 DIAGNOSIS — I10 ESSENTIAL HYPERTENSION: ICD-10-CM

## 2023-05-18 RX ORDER — NIFEDIPINE 30 MG/1
30 TABLET, FILM COATED, EXTENDED RELEASE ORAL
Qty: 30 TABLET | Refills: 10 | Status: SHIPPED | OUTPATIENT
Start: 2023-05-18

## 2023-05-18 NOTE — TELEPHONE ENCOUNTER
Caller: Dorothy Zavala    Relationship: Self    Best call back number: [phone]    Requested Prescriptions:   Requested Prescriptions     Pending Prescriptions Disp Refills   • NIFEdipine CC (ADALAT CC) 30 MG 24 hr tablet 30 tablet 10     Sig: Take 1 tablet by mouth Daily.   • sertraline (ZOLOFT) 50 MG tablet 30 tablet 5     Sig: Take 1 tablet by mouth Daily.        Pharmacy where request should be sent: [facility]/PHARMACY #[id number] - [place] [address] - [phone] [facility] [phone]      Last office visit with prescribing clinician: 4/5/2023   Last telemedicine visit with prescribing clinician: 4/27/2023   Next office visit with prescribing clinician: 7/7/2023     Additional details provided by patient: OUT OF THE MEDICATION     Does the patient have less than a 3 day supply:  [x] Yes  [] No    Would you like a call back once the refill request has been completed: [x] Yes [] No    If the office needs to give you a call back, can they leave a voicemail: [x] Yes [] No    Esteban Lopez Rep   05/18/23 09:49 EDT

## 2023-06-12 DIAGNOSIS — F33.0 MILD EPISODE OF RECURRENT MAJOR DEPRESSIVE DISORDER: ICD-10-CM

## 2023-07-07 ENCOUNTER — TELEPHONE (OUTPATIENT)
Dept: INTERNAL MEDICINE | Facility: CLINIC | Age: 78
End: 2023-07-07

## 2023-07-07 NOTE — TELEPHONE ENCOUNTER
Caller: Dorothy Zavala    Relationship: Self    Best call back number: [phone]    What medications are you currently taking:   Current Outpatient Medications on File Prior to Visit   Medication Sig Dispense Refill    acetaminophen (TYLENOL) 325 MG tablet Take 2 tablets by mouth Every 6 (Six) Hours As Needed for Mild Pain or Moderate Pain.      aspirin 81 MG chewable tablet Chew 1 tablet Daily. 30 tablet 0    hydrOXYzine (ATARAX) 50 MG tablet TAKE 1 TABLET (50 MG TOTAL) BY MOUTH IN THE MORNING      levothyroxine (SYNTHROID, LEVOTHROID) 100 MCG tablet Take 1 tablet by mouth Daily.      meloxicam (Mobic) 7.5 MG tablet Take 1 tablet by mouth Daily. 30 tablet 3    NIFEdipine CC (ADALAT CC) 30 MG 24 hr tablet Take 1 tablet by mouth Daily. 30 tablet 10    omeprazole (priLOSEC) 40 MG capsule Take 1 capsule by mouth Daily.      promethazine (PHENERGAN) 25 MG tablet Take 1 tablet by mouth.      QUEtiapine (SEROquel) 25 MG tablet Take 1 tablet by mouth Every Night. 90 tablet 0    sertraline (ZOLOFT) 50 MG tablet Take 1 tablet by mouth Daily. 90 tablet 1    zolpidem CR (AMBIEN CR) 12.5 MG CR tablet Take 1 tablet by mouth At Night As Needed for Sleep. 30 tablet 2    [DISCONTINUED] zolpidem CR (AMBIEN CR) 6.25 MG CR tablet Take 1 tablet by mouth At Night As Needed for Sleep. 30 tablet 0     No current facility-administered medications on file prior to visit.              Which medication are you concerned about: SAMPSON     Who prescribed you this medication: DOCTOR TATUM    What are your concerns: THE PATIENT STATES THAT SHE WAS SEEN TODAY AND PRESCRIBED AMBIEN THE PATIENT CALLED THE [facility] IN [place] AND WAS TOLD THAT THEY DO NOT HAVE THE PRESCRIPTION SHE WOULD LIKE TO KNOW IF THAT CAN BE SENT TIN TODAY

## 2023-07-17 ENCOUNTER — TREATMENT (OUTPATIENT)
Dept: PHYSICAL THERAPY | Facility: CLINIC | Age: 78
End: 2023-07-17
Payer: MEDICARE

## 2023-07-17 DIAGNOSIS — M25.511 ACUTE PAIN OF RIGHT SHOULDER: ICD-10-CM

## 2023-07-17 DIAGNOSIS — M54.50 ACUTE MIDLINE LOW BACK PAIN WITHOUT SCIATICA: Primary | ICD-10-CM

## 2023-07-17 NOTE — PROGRESS NOTES
Physical Therapy Initial Evaluation and Plan of Care  [address], [address]  [place] [address]    Patient: Dorothy Zavala   : 1945  Diagnosis/ICD-10 Code:  Acute midline low back pain without sciatica [M54.50]  Referring practitioner: Ivis Sparks MD  Date of Initial Visit: 2023  Today's Date: 2023  Patient seen for 1 session         Visit Diagnoses:    ICD-10-CM ICD-9-CM   1. Acute midline low back pain without sciatica  M54.50 724.2   2. Acute pain of right shoulder  M25.511 719.41         Subjective Questionnaire: QuickDASH: 63.64      Subjective Evaluation    History of Present Illness  Date of onset: 2023  Mechanism of injury: Patient presents with shoulder, low back and knee pain; shoulder pain is the worst  Fell 3 weeks ago, shoulder has been really bothering me  Shoulder hurt mildly prior to the fall, now much worse     Fell in [place], right knee slipped, fell onto left knee, grabbed with left arm to try to catch herself, thinks she hit right elbow on the floor   Started hurting more, later on that night, especially when trying to sleep , likes to sleep with her arm under the pillow, not able to now    She did not tell Dr. Sparks that she fell, when she saw her on 23  States she has spoken with an  about her fall  No imaging performed     Drove herself to therapy today    Hx of right wrist fx with ORIF [id number], 2 CTS surgeries in the 's , right TKA 2 years ago       Patient Occupation: retired Quality of life: fair    Pain  Current pain ratin  Location: right shoulder, back  Quality: dull ache, sharp and discomfort  Relieving factors: medications, change in position, rest and heat (Tylenol; massaging/vibrator)  Aggravating factors: movement, lifting, overhead activity, prolonged positioning, sleeping, outstretched reach, stairs, squatting and ambulation (stairs cause low back and knee pain)  Progression: worsening    Patient Goals  Patient goals for  therapy: independence with ADLs/IADLs, increased motion, decreased pain and return to sport/leisure activities  Patient goal: enjoys walking at the park- low back pain is making that hard now         Objective          Postural Observations  Seated posture: fair  Standing posture: fair      Palpation   Left   Tenderness of the lumbar paraspinals.     Right Tenderness of the lumbar paraspinals.     Tenderness     Right Shoulder  Tenderness in the AC joint and infraspinatus tendon. No tenderness in the clavicle.     Additional Tenderness Details  Tender right superior angle of scapula     Cervical/Thoracic Screen   Cervical range of motion within normal limits  Cervical range of motion within normal limits with the following exceptions: :   55# left   45# right (pain in right shoulder)     Neurological Testing     Sensation     Shoulder   Left Shoulder   Intact: light touch    Right Shoulder   Intact: Light touch    Reflexes   Left   Biceps (C5/C6): normal (2+)  Brachioradialis (C6): normal (2+)  Triceps (C7): normal (2+)  Patellar (L4): normal (2+)  Achilles (S1): normal (2+)    Right   Biceps (C5/C6): normal (2+)  Brachioradialis (C6): normal (2+)  Triceps (C7): normal (2+)  Patellar (L4): normal (2+)  Achilles (S1): normal (2+)    Active Range of Motion   Cervical/Thoracic Spine   Normal active range of motion  Left Shoulder   Normal active range of motion    Right Shoulder   Flexion: 110 degrees with pain  Abduction: WFL and with pain  External rotation 0°: WFL  External rotation BTH: WFL and with pain  Internal rotation BTB: WFL and with pain    Lumbar   Flexion: WFL and with pain  Extension: WFL  Left lateral flexion: WFL  Right lateral flexion: WFL and with pain  Left rotation: WFL  Right rotation: Active right lumbar rotation: pain in right low back. WFL and with pain    Strength/Myotome Testing     Right Shoulder     Planes of Motion   Flexion: 2-   Abduction: 4-   External rotation at 0°: 4   Internal  rotation at 0°: 4     Tests   Cervical     Right   Positive active compression ([place]).     Right Shoulder   Positive belly press, empty can and Hawkin's.   Negative external rotation lag sign, lift-off and Speed's.         Assessment & Plan     Assessment  Impairments: abnormal or restricted ROM, activity intolerance, impaired physical strength, lacks appropriate home exercise program and pain with function  Functional Limitations: carrying objects, lifting, sleeping, walking, pulling, pushing, uncomfortable because of pain and reaching overhead  Assessment details: 77 yo presents to PT with c/o right shoulder>low back pain that was exacerbated after a fall in [place]; she has limitation with shoulder mobility in some planes of motion; pain at end range of motion all directions; she would like to focus on the shoulder at this time since it is most bothersome; she has minimal areas of tenderness, primarily at superior angle of scapula, RTC tendon insertion and AC joint; she should respond well to PT to improve functional mobility and strength for ADL's    Barriers to therapy: PMH, previous UE surgeries, arthritis  Prognosis: fair    Goals  Plan Goals: 4 weeks:   1. IND with HEP  2. Patient to display full, pain-free mobility of right shoulder for ADL's  3. Patient to improve QD score by 1 MCID    8 weeks:  1. Patient to display 5/5 MMT strength of right UE  2. Patient to perform up to 60 minutes of ADL simulated exercise without a significant increase in pain  3. Patient to score < 20 on QD    Plan  Therapy options: will be seen for skilled therapy services  Planned modality interventions: iontophoresis, TENS, thermotherapy (hydrocollator packs), ultrasound, high voltage pulsed current (pain management), dry needling and cryotherapy  Planned therapy interventions: manual therapy, neuromuscular re-education, orthotic fitting/training, postural training, soft tissue mobilization, strengthening, stretching,  therapeutic activities, joint mobilization, home exercise program, functional ROM exercises, flexibility, balance/weight-bearing training and body mechanics training  Frequency: 2x week  Duration in weeks: 8  Treatment plan discussed with: patient  Plan details: May transition to lumbar therapy prn        Access Code: [email or web address]  URL: [email or web address]  Date: 07/17/2023  Prepared by: Rob Singh    Exercises  - Supine Shoulder Flexion Overhead with Dowel  - 3 x daily - 7 x weekly - 3 sets - 10 reps  - Standing Backward Shoulder Rolls  - 3 x daily - 7 x weekly - 3 sets - 10 reps  - Seated Shoulder Flexion Towel Slide at Table Top  - 3 x daily - 7 x weekly - 3 sets - 10 reps  - Seated Shoulder Abduction Towel Slide at Table Top  - 3 x daily - 7 x weekly - 3 sets - 10 reps  - Standing Shoulder Flexion Wall Walk  - 3 x daily - 7 x weekly - 3 sets - 10 reps    Timed:         Manual Therapy:    10     mins  72478;     Therapeutic Exercise:    15     mins  84023;     Neuromuscular Rosy:        mins  17060;    Therapeutic Activity:     15     mins  78260;     Gait Training:           mins  52441;     Ultrasound:          mins  91300;    Ionto                                   mins   49646  Self Care                            mins   53504  Canalith Repos         mins 48431      Un-Timed:  Electrical Stimulation:         mins  27925 ( );  Dry Needling          mins self-pay  Traction          mins 17075    Low Eval          Mins  35194  Mod Eval     30     Mins  38372  High Eval                            Mins  42825        Timed Treatment:   40   mins   Total Treatment:     70   mins          PT: ZHANNA Singh PT     License Number: [id number]  Electronically signed by ZHANNA Singh PT, 07/17/23, 2:12 PM EDT    Certification Period: 7/20/2023 thru 10/17/2023  I certify that the therapy services are furnished while this patient is under my care.  The services outlined above are required by this patient,  and will be reviewed every 90 days.         Physician Signature:__________________________________________________    PHYSICIAN: Ivis Sparks MD  NPI: [id number]                                      DATE:      Please sign and return via fax to [email or web address] . Thank you, [facility].

## 2023-07-31 ENCOUNTER — TREATMENT (OUTPATIENT)
Dept: PHYSICAL THERAPY | Facility: CLINIC | Age: 78
End: 2023-07-31
Payer: MEDICARE

## 2023-07-31 DIAGNOSIS — M25.511 ACUTE PAIN OF RIGHT SHOULDER: Primary | ICD-10-CM

## 2023-07-31 PROCEDURE — 97110 THERAPEUTIC EXERCISES: CPT | Performed by: PHYSICAL THERAPIST

## 2023-07-31 PROCEDURE — 97530 THERAPEUTIC ACTIVITIES: CPT | Performed by: PHYSICAL THERAPIST

## 2023-07-31 PROCEDURE — 97112 NEUROMUSCULAR REEDUCATION: CPT | Performed by: PHYSICAL THERAPIST

## 2023-08-02 ENCOUNTER — TREATMENT (OUTPATIENT)
Dept: PHYSICAL THERAPY | Facility: CLINIC | Age: 78
End: 2023-08-02
Payer: MEDICARE

## 2023-08-02 DIAGNOSIS — M25.511 ACUTE PAIN OF RIGHT SHOULDER: Primary | ICD-10-CM

## 2023-08-07 ENCOUNTER — TELEPHONE (OUTPATIENT)
Dept: PHYSICAL THERAPY | Facility: CLINIC | Age: 78
End: 2023-08-07

## 2023-08-07 NOTE — TELEPHONE ENCOUNTER
Caller:     Relationship:     What was the call regarding: PATIENT CANCELLED DUE TO INCOMING SEVERE WEATHER

## 2023-08-09 ENCOUNTER — TREATMENT (OUTPATIENT)
Dept: PHYSICAL THERAPY | Facility: CLINIC | Age: 78
End: 2023-08-09
Payer: MEDICARE

## 2023-08-09 DIAGNOSIS — M25.511 ACUTE PAIN OF RIGHT SHOULDER: Primary | ICD-10-CM

## 2023-08-09 PROCEDURE — G0283 ELEC STIM OTHER THAN WOUND: HCPCS | Performed by: PHYSICAL THERAPIST

## 2023-08-09 PROCEDURE — 97110 THERAPEUTIC EXERCISES: CPT | Performed by: PHYSICAL THERAPIST

## 2023-08-09 PROCEDURE — 97112 NEUROMUSCULAR REEDUCATION: CPT | Performed by: PHYSICAL THERAPIST

## 2023-08-09 PROCEDURE — 97530 THERAPEUTIC ACTIVITIES: CPT | Performed by: PHYSICAL THERAPIST

## 2023-08-09 NOTE — PROGRESS NOTES
Physical Therapy Daily Treatment Note  [address] [facility], [address]  [place] [address]    Patient: Dorothy Zavala   : 1945  Referring practitioner: Ivis Sparks MD  Date of Initial Visit: Type: THERAPY  Noted: 2023  Today's Date: 2023  Patient seen for 4 sessions       Visit Diagnoses:    ICD-10-CM ICD-9-CM   1. Acute pain of right shoulder  M25.511 719.41       Visit #: 4    Subjective   Shoulder still hurts, left knee has been hurting (hx of right knee TKA-partial)    Objective   See Exercise, Manual, and Modality Logs for complete treatment    Some shoulder discomfort at end range of flexion     Wangdaizhijia Exercises:  Access Code: [email or web address]  URL: [email or web address]  Date: 2023  Prepared by: Rob Singh     Exercises  - Supine Shoulder Flexion Overhead with Dowel  - 3 x daily - 7 x weekly - 3 sets - 10 reps  - Standing Backward Shoulder Rolls  - 3 x daily - 7 x weekly - 3 sets - 10 reps  - Seated Shoulder Flexion Towel Slide at Table Top  - 3 x daily - 7 x weekly - 3 sets - 10 reps  - Seated Shoulder Abduction Towel Slide at Table Top  - 3 x daily - 7 x weekly - 3 sets - 10 reps  - Standing Shoulder Flexion Wall Walk  - 3 x daily - 7 x weekly - 3 sets - 10 reps       Assessment/Plan  Patient needs continued skilled Physical Therapy services for decreasing pain and improving mobility, strength, balance and endurance in order to return to work and activities of daily living without pain or dysfunction    Treatment considerations for next visit:  Advance as tolerated     Timed:   Manual Therapy:         mins  13335;     Therapeutic Exercise:    15     mins  25208;     Neuromuscular Rosy:    15    mins  93188;    Therapeutic Activity:     10     mins  33684;     Gait Training:           mins  68822;     Ultrasound:          mins  24376;    Ionto                                   mins   73043  Self Care                            mins   29920  Canalith Repos         mins    98964    Un-Timed:  Electrical Stimulation:    15     mins  18524 ( );  Dry Needling          mins self-pay  Traction          mins 86820      Timed Treatment:   40   mins   Total Treatment:     55   mins    ZHANNA Singh, PT  [place] License: [id number]

## 2023-08-14 ENCOUNTER — TREATMENT (OUTPATIENT)
Dept: PHYSICAL THERAPY | Facility: CLINIC | Age: 78
End: 2023-08-14
Payer: MEDICARE

## 2023-08-14 DIAGNOSIS — M25.511 ACUTE PAIN OF RIGHT SHOULDER: Primary | ICD-10-CM

## 2023-08-14 PROCEDURE — 97110 THERAPEUTIC EXERCISES: CPT | Performed by: PHYSICAL THERAPIST

## 2023-08-14 PROCEDURE — G0283 ELEC STIM OTHER THAN WOUND: HCPCS | Performed by: PHYSICAL THERAPIST

## 2023-08-14 PROCEDURE — 97530 THERAPEUTIC ACTIVITIES: CPT | Performed by: PHYSICAL THERAPIST

## 2023-08-14 PROCEDURE — 97112 NEUROMUSCULAR REEDUCATION: CPT | Performed by: PHYSICAL THERAPIST

## 2023-08-14 NOTE — PROGRESS NOTES
Physical Therapy Daily Treatment Note  [address] [facility], [address]  [place] [address]    Patient: Dorothy Zavala   : 1945  Referring practitioner: Ivis Sparks MD  Date of Initial Visit: Type: THERAPY  Noted: 2023  Today's Date: 2023  Patient seen for 5 sessions       Visit Diagnoses:    ICD-10-CM ICD-9-CM   1. Acute pain of right shoulder  M25.511 719.41       Visit #: 5    Subjective   No new complaints    Objective   See Exercise, Manual, and Modality Logs for complete treatment    WFL mobility      [employer] Exercises:  Access Code: [email or web address]  URL: [email or web address]  Date: 2023  Prepared by: Rob Singh     Exercises  - Supine Shoulder Flexion Overhead with Dowel  - 3 x daily - 7 x weekly - 3 sets - 10 reps  - Standing Backward Shoulder Rolls  - 3 x daily - 7 x weekly - 3 sets - 10 reps  - Seated Shoulder Flexion Towel Slide at Table Top  - 3 x daily - 7 x weekly - 3 sets - 10 reps  - Seated Shoulder Abduction Towel Slide at Table Top  - 3 x daily - 7 x weekly - 3 sets - 10 reps  - Standing Shoulder Flexion Wall Walk  - 3 x daily - 7 x weekly - 3 sets - 10 reps       Assessment/Plan  Patient needs continued skilled Physical Therapy services for decreasing pain and improving mobility, strength, balance and endurance in order to return to work and activities of daily living without pain or dysfunction    Treatment considerations for next visit:  Advance as tolerated     Timed:   Manual Therapy:         mins  60749;     Therapeutic Exercise:    15     mins  92696;     Neuromuscular Rosy:    15    mins  05422;    Therapeutic Activity:     15     mins  14214;     Gait Training:           mins  13882;     Ultrasound:          mins  98434;    Ionto                                   mins   85394  Self Care                            mins   88457  Canalith Repos         mins   86928    Un-Timed:  Electrical Stimulation:    15     mins  38988 ( );  Dry Needling           mins self-pay  Traction          mins 85643      Timed Treatment:   45   mins   Total Treatment:     60   mins    ZHANNA Singh, PT  [place] License: [id number]

## 2023-08-16 ENCOUNTER — TREATMENT (OUTPATIENT)
Dept: PHYSICAL THERAPY | Facility: CLINIC | Age: 78
End: 2023-08-16
Payer: MEDICARE

## 2023-08-16 DIAGNOSIS — M25.511 ACUTE PAIN OF RIGHT SHOULDER: Primary | ICD-10-CM

## 2023-08-16 PROCEDURE — 97112 NEUROMUSCULAR REEDUCATION: CPT | Performed by: PHYSICAL THERAPIST

## 2023-08-16 PROCEDURE — 97530 THERAPEUTIC ACTIVITIES: CPT | Performed by: PHYSICAL THERAPIST

## 2023-08-16 PROCEDURE — G0283 ELEC STIM OTHER THAN WOUND: HCPCS | Performed by: PHYSICAL THERAPIST

## 2023-08-16 PROCEDURE — 97110 THERAPEUTIC EXERCISES: CPT | Performed by: PHYSICAL THERAPIST

## 2023-08-20 NOTE — PROGRESS NOTES
Physical Therapy Daily Treatment Note  [address] [facility], [address]  [place] [address]    Patient: Dorothy Zavala   : 1945  Referring practitioner: Ivis Sparks MD  Date of Initial Visit: Type: THERAPY  Noted: 2023  Today's Date: 2023  Patient seen for 6 sessions       Visit Diagnoses:    ICD-10-CM ICD-9-CM   1. Acute pain of right shoulder  M25.511 719.41       Visit #: 6    Subjective   Feels about the same; right shoulder still hurts , catches    Objective   See Exercise, Manual, and Modality Logs for complete treatment    Full passive mobility; no s/s of adhesive capsulitis     [employer] Exercises:  Access Code: [email or web address]  URL: [email or web address]  Date: 2023  Prepared by: Rob Singh     Exercises  - Supine Shoulder Flexion Overhead with Dowel  - 3 x daily - 7 x weekly - 3 sets - 10 reps  - Standing Backward Shoulder Rolls  - 3 x daily - 7 x weekly - 3 sets - 10 reps  - Seated Shoulder Flexion Towel Slide at Table Top  - 3 x daily - 7 x weekly - 3 sets - 10 reps  - Seated Shoulder Abduction Towel Slide at Table Top  - 3 x daily - 7 x weekly - 3 sets - 10 reps  - Standing Shoulder Flexion Wall Walk  - 3 x daily - 7 x weekly - 3 sets - 10 reps    Added: SL ER/ABD with 2# 3 x 10    Assessment/Plan  Performs well with light resistive and mobility exercises; chief complaint is pain/catching with movement in certain positions; s/s consistent with RTC strain/impingement sx      Treatment considerations for next visit:  Advance as tolerated     Timed:   Manual Therapy:         mins  87859;     Therapeutic Exercise:    15     mins  20339;     Neuromuscular Rosy:    15    mins  03623;    Therapeutic Activity:     15     mins  43407;     Gait Training:           mins  68308;     Ultrasound:          mins  10429;    Ionto                                   mins   26131  Self Care                            mins   68278  Canalith Repos         mins   36158    Un-Timed:  Electrical  Stimulation:    15     mins  92163 ( );  Dry Needling          mins self-pay  Traction          mins 41551      Timed Treatment:   45   mins   Total Treatment:     60   mins    ZHANNA Singh, PT  [place] License: [id number]

## 2023-08-21 ENCOUNTER — TELEPHONE (OUTPATIENT)
Dept: PHYSICAL THERAPY | Facility: CLINIC | Age: 78
End: 2023-08-21

## 2023-08-21 NOTE — TELEPHONE ENCOUNTER
Caller: Dorothy Zavala    Relationship: Self         What was the call regarding: STUCK IN [place] CAN NOT MAKE IT

## 2023-08-22 ENCOUNTER — TREATMENT (OUTPATIENT)
Dept: PHYSICAL THERAPY | Facility: CLINIC | Age: 78
End: 2023-08-22
Payer: MEDICARE

## 2023-08-22 DIAGNOSIS — M25.511 ACUTE PAIN OF RIGHT SHOULDER: Primary | ICD-10-CM

## 2023-08-22 NOTE — PROGRESS NOTES
Physical Therapy Daily Treatment Note  [address] [facility], [address]  [place] [address]    Patient: Dorothy Zavala   : 1945  Referring practitioner: Ivis Sparks MD  Date of Initial Visit: Type: THERAPY  Noted: 2023  Today's Date: 2023  Patient seen for 7 sessions       Visit Diagnoses:    ICD-10-CM ICD-9-CM   1. Acute pain of right shoulder  M25.511 719.41       Visit #: 7    Subjective   Right shoulder continues to be painful with AROM at end range flexion, abduction; catches  Left knee still hurts, too, since the fall at [facility] she has pain with driving, used to drive with only right hand, now needs left hand to assist on the wheel due to pain when turning    Objective   See Exercise, Manual, and Modality Logs for complete treatment    Tenderness with palpation to GH joint, IS/SS tendons; pain with resisted ER>IR of shoulder     [employer] Exercises:  Access Code: [email or web address]  URL: [email or web address]  Date: 2023  Prepared by: Rob Singh     Exercises  - Supine Shoulder Flexion Overhead with Dowel  - 3 x daily - 7 x weekly - 3 sets - 10 reps  - Standing Backward Shoulder Rolls  - 3 x daily - 7 x weekly - 3 sets - 10 reps  - Seated Shoulder Flexion Towel Slide at Table Top  - 3 x daily - 7 x weekly - 3 sets - 10 reps  - Seated Shoulder Abduction Towel Slide at Table Top  - 3 x daily - 7 x weekly - 3 sets - 10 reps  - Standing Shoulder Flexion Wall Walk  - 3 x daily - 7 x weekly - 3 sets - 10 reps     Added: SL ER/ABD with 2# 3 x 10 (deferred)       Assessment/Plan  Patient progressing slowly with therapy; right shoulder is painful with end range flexion/abduction and resisted RTC movements; continue to progress as tolerated ; will plan to examine the left knee at next visit    Treatment considerations for next visit:  Advance as tolerated     Timed:   Manual Therapy:    10     mins  33284;     Therapeutic Exercise:    20     mins  12460;     Neuromuscular Rosy:         mins  60430;    Therapeutic Activity:     10     mins  89431;     Gait Training:           mins  03812;     Ultrasound:          mins  96934;    Ionto                                   mins   62921  Self Care                            mins   33669  Canalith Repos         mins   54442    Un-Timed:  Electrical Stimulation:    20     mins  78589 ( );  Dry Needling          mins self-pay  Traction          mins 38695      Timed Treatment:   40   mins   Total Treatment:     60   mins    ZHANNA Singh, PT  [place] License: [id number]

## 2023-08-24 ENCOUNTER — TREATMENT (OUTPATIENT)
Dept: PHYSICAL THERAPY | Facility: CLINIC | Age: 78
End: 2023-08-24
Payer: MEDICARE

## 2023-08-24 DIAGNOSIS — M25.511 ACUTE PAIN OF RIGHT SHOULDER: Primary | ICD-10-CM

## 2023-08-24 NOTE — PROGRESS NOTES
Physical Therapy Daily Treatment Note  [address] [facility], [address]  [place] [address]    Patient: Dorothy Zavala   : 1945  Referring practitioner: Ivis Sparks MD  Date of Initial Visit: Type: THERAPY  Noted: 2023  Today's Date: 2023  Patient seen for 8 sessions       Visit Diagnoses:    ICD-10-CM ICD-9-CM   1. Acute pain of right shoulder  M25.511 719.41       Visit #: 8    Subjective   Shoulder continues to hurt; difficulty performing overhead tasks such as washing hair, pain with driving    Objective   See Exercise, Manual, and Modality Logs for complete treatment    Pain at end range of shoulder flexion     [employer] Exercises:  [email or web address]     Assessment/Plan  Patient needs continued skilled Physical Therapy services for decreasing pain and improving mobility, strength, balance and endurance in order to return to work and activities of daily living without pain or dysfunction    Treatment considerations for next visit:  Advance as tolerated     Timed:   Manual Therapy:    10     mins  73177;     Therapeutic Exercise:    20     mins  30841;     Neuromuscular Rosy:        mins  06673;    Therapeutic Activity:     10     mins  29570;     Gait Training:           mins  81294;     Ultrasound:          mins  73960;    Ionto                                   mins   61150  Self Care                            mins   77483  Canalith Repos         mins   73988    Un-Timed:  Electrical Stimulation:    20     mins  32285 ( );  Dry Needling          mins self-pay  Traction          mins 11908      Timed Treatment:   40   mins   Total Treatment:     60   mins    ZHANNA Singh, PT  [place] License: [id number]

## 2023-08-28 ENCOUNTER — TREATMENT (OUTPATIENT)
Dept: PHYSICAL THERAPY | Facility: CLINIC | Age: 78
End: 2023-08-28
Payer: MEDICARE

## 2023-08-28 DIAGNOSIS — M25.511 ACUTE PAIN OF RIGHT SHOULDER: Primary | ICD-10-CM

## 2023-08-28 PROCEDURE — 97110 THERAPEUTIC EXERCISES: CPT | Performed by: PHYSICAL THERAPIST

## 2023-08-28 PROCEDURE — 97530 THERAPEUTIC ACTIVITIES: CPT | Performed by: PHYSICAL THERAPIST

## 2023-08-28 PROCEDURE — G0283 ELEC STIM OTHER THAN WOUND: HCPCS | Performed by: PHYSICAL THERAPIST

## 2023-08-28 NOTE — PROGRESS NOTES
Physical Therapy Daily Treatment Note  [address] [facility], [address]  [place] [address]    Patient: Dorothy Zavala   : 1945  Referring practitioner: Ivis Sparks MD  Date of Initial Visit: Type: THERAPY  Noted: 2023  Today's Date: 2023  Patient seen for 9 sessions       Visit Diagnoses:    ICD-10-CM ICD-9-CM   1. Acute pain of right shoulder  M25.511 719.41       Visit #: 9    Subjective   Feeling a little better  Right shoulder still sore, catches when I move it     Objective   See Exercise, Manual, and Modality Logs for complete treatment    Free motion seated rows 7#, 3 x 10    Tenderness at AC joint, anterior deltoid    Assessment/Plan  Making minimal progress to date; continue to address functional mobility and strength deficits; performs well with strengthening without pain; fatigues quickly     Treatment considerations for next visit:  Advance as tolerated     Timed:   Manual Therapy:         mins  02649;     Therapeutic Exercise:    25     mins  65348;     Neuromuscular Rosy:        mins  55728;    Therapeutic Activity:     15     mins  48257;     Gait Training:           mins  04202;     Ultrasound:          mins  33959;    Ionto                                   mins   05322  Self Care                            mins   61111  Canalith Repos         mins   97368    Un-Timed:  Electrical Stimulation:    20     mins  67855 ( );  Dry Needling          mins self-pay  Traction          mins 79121      Timed Treatment:   40   mins   Total Treatment:     60   mins    ZHANNA Singh, PT  [place] License: [id number]

## 2023-08-30 ENCOUNTER — TREATMENT (OUTPATIENT)
Dept: PHYSICAL THERAPY | Facility: CLINIC | Age: 78
End: 2023-08-30
Payer: MEDICARE

## 2023-08-30 DIAGNOSIS — M25.511 ACUTE PAIN OF RIGHT SHOULDER: Primary | ICD-10-CM

## 2023-08-30 PROCEDURE — 97110 THERAPEUTIC EXERCISES: CPT | Performed by: PHYSICAL THERAPIST

## 2023-08-30 PROCEDURE — G0283 ELEC STIM OTHER THAN WOUND: HCPCS | Performed by: PHYSICAL THERAPIST

## 2023-08-30 PROCEDURE — 97530 THERAPEUTIC ACTIVITIES: CPT | Performed by: PHYSICAL THERAPIST

## 2023-09-01 NOTE — PROGRESS NOTES
Physical Therapy Daily Treatment Note  [address] [facility], [address]  [place] [address]    Patient: Dorothy Zavala   : 1945  Referring practitioner: Ivis Sparks MD  Date of Initial Visit: Type: THERAPY  Noted: 2023  Today's Date: 2023  Patient seen for 10 sessions       Visit Diagnoses:    ICD-10-CM ICD-9-CM   1. Acute pain of right shoulder  M25.511 719.41       Visit #: 10    Subjective   Right shoulder continues to hurt, catches at top of movement     Objective   See Exercise, Manual, and Modality Logs for complete treatm    Assessment/Plan  Patient needs continued skilled Physical Therapy services for decreasing pain and improving mobility, strength, balance and endurance in order to return to work and activities of daily living without pain or dysfunction    Treatment considerations for next visit:  Advance as tolerated     Timed:   Manual Therapy:         mins  77055;     Therapeutic Exercise:    25     mins  10294;     Neuromuscular Rosy:        mins  50284;    Therapeutic Activity:     15     mins  91726;     Gait Training:           mins  89779;     Ultrasound:          mins  79994;    Ionto                                   mins   25231  Self Care                            mins   97094  Canalith Repos         mins   30727    Un-Timed:  Electrical Stimulation:    20     mins  98287 (MC );  Dry Needling          mins self-pay  Traction          mins 62402      Timed Treatment:   40   mins   Total Treatment:     60   mins    ZHANNA Singh, PT  [place] License: [id number]

## 2023-09-02 RX ORDER — ATORVASTATIN CALCIUM 80 MG/1
80 TABLET, FILM COATED ORAL NIGHTLY
Qty: 30 TABLET | Refills: 0 | Status: SHIPPED | OUTPATIENT
Start: 2023-09-02 | End: 2023-10-02

## 2023-09-11 RX ORDER — HYDROXYZINE 50 MG/1
50 TABLET, FILM COATED ORAL EVERY MORNING
Qty: 30 TABLET | Refills: 2 | Status: SHIPPED | OUTPATIENT
Start: 2023-09-11

## 2023-09-11 RX ORDER — LEVOTHYROXINE SODIUM 0.1 MG/1
100 TABLET ORAL DAILY
Qty: 30 TABLET | Refills: 2 | Status: SHIPPED | OUTPATIENT
Start: 2023-09-11

## 2023-09-25 RX ORDER — ATORVASTATIN CALCIUM 80 MG/1
TABLET, FILM COATED ORAL
Qty: 30 TABLET | Refills: 0 | OUTPATIENT
Start: 2023-09-25

## 2023-10-02 DIAGNOSIS — M54.50 CHRONIC MIDLINE LOW BACK PAIN, UNSPECIFIED WHETHER SCIATICA PRESENT: ICD-10-CM

## 2023-10-02 DIAGNOSIS — F41.9 ANXIETY: ICD-10-CM

## 2023-10-02 DIAGNOSIS — G89.29 CHRONIC MIDLINE LOW BACK PAIN, UNSPECIFIED WHETHER SCIATICA PRESENT: ICD-10-CM

## 2023-10-02 RX ORDER — QUETIAPINE FUMARATE 25 MG/1
25 TABLET, FILM COATED ORAL NIGHTLY
Qty: 30 TABLET | Refills: 0 | Status: SHIPPED | OUTPATIENT
Start: 2023-10-02

## 2023-10-02 RX ORDER — MELOXICAM 7.5 MG/1
7.5 TABLET ORAL DAILY
Qty: 30 TABLET | Refills: 3 | Status: SHIPPED | OUTPATIENT
Start: 2023-10-02

## 2023-10-05 DIAGNOSIS — F51.01 PRIMARY INSOMNIA: ICD-10-CM

## 2023-10-05 RX ORDER — ZOLPIDEM TARTRATE 12.5 MG/1
12.5 TABLET, FILM COATED, EXTENDED RELEASE ORAL NIGHTLY PRN
Qty: 30 TABLET | Refills: 2 | Status: SHIPPED | OUTPATIENT
Start: 2023-10-05

## 2023-10-05 NOTE — TELEPHONE ENCOUNTER
Caller: Dorothy Zavala    Relationship: Self    Best call back number: [phone]     Requested Prescriptions:   Requested Prescriptions     Pending Prescriptions Disp Refills    zolpidem CR (AMBIEN CR) 12.5 MG CR tablet 30 tablet 2     Sig: Take 1 tablet by mouth At Night As Needed for Sleep.        Pharmacy where request should be sent: [facility] - [place] [address] [address] [facility] - [phone] [facility] [phone]      Last office visit with prescribing clinician: Visit date not found   Last telemedicine visit with prescribing clinician: Visit date not found   Next office visit with prescribing clinician: Visit date not found     Additional details provided by patient:     Does the patient have less than a 3 day supply:  [] Yes  [x] No    Would you like a call back once the refill request has been completed: [] Yes [x] No    If the office needs to give you a call back, can they leave a voicemail: [] Yes [x] No    Esteban Farnsworth Rep   10/05/23 10:48 EDT

## 2023-10-12 ENCOUNTER — OFFICE VISIT (OUTPATIENT)
Dept: FAMILY MEDICINE CLINIC | Facility: CLINIC | Age: 78
End: 2023-10-12
Payer: MEDICARE

## 2023-10-12 ENCOUNTER — HOSPITAL ENCOUNTER (OUTPATIENT)
Dept: GENERAL RADIOLOGY | Facility: HOSPITAL | Age: 78
Discharge: HOME | End: 2023-10-12
Admitting: FAMILY MEDICINE
Payer: MEDICARE

## 2023-10-12 VITALS
DIASTOLIC BLOOD PRESSURE: 80 MMHG | BODY MASS INDEX: 32.3 KG/M2 | HEART RATE: 83 BPM | SYSTOLIC BLOOD PRESSURE: 142 MMHG | OXYGEN SATURATION: 97 % | TEMPERATURE: 97.8 F | RESPIRATION RATE: 16 BRPM | WEIGHT: 201 LBS | HEIGHT: 66 IN

## 2023-10-12 DIAGNOSIS — I10 PRIMARY HYPERTENSION: Primary | Chronic | ICD-10-CM

## 2023-10-12 DIAGNOSIS — M25.511 CHRONIC RIGHT SHOULDER PAIN: ICD-10-CM

## 2023-10-12 DIAGNOSIS — G89.29 CHRONIC RIGHT SHOULDER PAIN: ICD-10-CM

## 2023-10-12 DIAGNOSIS — Z23 NEED FOR VACCINATION: ICD-10-CM

## 2023-10-12 DIAGNOSIS — G31.84 MILD COGNITIVE IMPAIRMENT: ICD-10-CM

## 2023-10-12 DIAGNOSIS — E03.9 HYPOTHYROIDISM, UNSPECIFIED TYPE: Chronic | ICD-10-CM

## 2023-10-12 DIAGNOSIS — F33.0 MILD EPISODE OF RECURRENT MAJOR DEPRESSIVE DISORDER: ICD-10-CM

## 2023-10-12 LAB
ALBUMIN SERPL-MCNC: 4.4 G/DL (ref 3.5–5.2)
ALBUMIN/GLOB SERPL: 1.8 G/DL
ALP SERPL-CCNC: 101 U/L (ref 39–117)
ALT SERPL-CCNC: 12 U/L (ref 1–33)
AST SERPL-CCNC: 12 U/L (ref 1–32)
BASOPHILS # BLD AUTO: 0.04 10*3/MM3 (ref 0–0.2)
BASOPHILS NFR BLD AUTO: 0.6 % (ref 0–1.5)
BILIRUB SERPL-MCNC: 0.3 MG/DL (ref 0–1.2)
BUN SERPL-MCNC: 15 MG/DL (ref 8–23)
BUN/CREAT SERPL: 19.7 (ref 7–25)
CALCIUM SERPL-MCNC: 10.3 MG/DL (ref 8.6–10.5)
CHLORIDE SERPL-SCNC: 105 MMOL/L (ref 98–107)
CHOLEST SERPL-MCNC: 231 MG/DL (ref 0–200)
CO2 SERPL-SCNC: 22.4 MMOL/L (ref 22–29)
CREAT SERPL-MCNC: 0.76 MG/DL (ref 0.57–1)
EGFRCR SERPLBLD CKD-EPI 2021: 81.3 ML/MIN/1.73
EOSINOPHIL # BLD AUTO: 0.09 10*3/MM3 (ref 0–0.4)
EOSINOPHIL NFR BLD AUTO: 1.4 % (ref 0.3–6.2)
ERYTHROCYTE [DISTWIDTH] IN BLOOD BY AUTOMATED COUNT: 13.4 % (ref 12.3–15.4)
GLOBULIN SER CALC-MCNC: 2.5 GM/DL
GLUCOSE SERPL-MCNC: 83 MG/DL (ref 65–99)
HCT VFR BLD AUTO: 38.6 % (ref 34–46.6)
HDLC SERPL-MCNC: 78 MG/DL (ref 40–60)
HGB BLD-MCNC: 12.2 G/DL (ref 12–15.9)
IMM GRANULOCYTES # BLD AUTO: 0.02 10*3/MM3 (ref 0–0.05)
IMM GRANULOCYTES NFR BLD AUTO: 0.3 % (ref 0–0.5)
LDLC SERPL CALC-MCNC: 135 MG/DL (ref 0–100)
LYMPHOCYTES # BLD AUTO: 1.49 10*3/MM3 (ref 0.7–3.1)
LYMPHOCYTES NFR BLD AUTO: 22.7 % (ref 19.6–45.3)
MCH RBC QN AUTO: 26.5 PG (ref 26.6–33)
MCHC RBC AUTO-ENTMCNC: 31.6 G/DL (ref 31.5–35.7)
MCV RBC AUTO: 83.7 FL (ref 79–97)
MONOCYTES # BLD AUTO: 0.6 10*3/MM3 (ref 0.1–0.9)
MONOCYTES NFR BLD AUTO: 9.1 % (ref 5–12)
NEUTROPHILS # BLD AUTO: 4.32 10*3/MM3 (ref 1.7–7)
NEUTROPHILS NFR BLD AUTO: 65.9 % (ref 42.7–76)
NRBC BLD AUTO-RTO: 0 /100 WBC (ref 0–0.2)
PLATELET # BLD AUTO: 309 10*3/MM3 (ref 140–450)
POTASSIUM SERPL-SCNC: 4.7 MMOL/L (ref 3.5–5.2)
PROT SERPL-MCNC: 6.9 G/DL (ref 6–8.5)
RBC # BLD AUTO: 4.61 10*6/MM3 (ref 3.77–5.28)
SODIUM SERPL-SCNC: 139 MMOL/L (ref 136–145)
TRIGL SERPL-MCNC: 104 MG/DL (ref 0–150)
TSH SERPL DL<=0.005 MIU/L-ACNC: 0.05 UIU/ML (ref 0.27–4.2)
VLDLC SERPL CALC-MCNC: 18 MG/DL (ref 5–40)
WBC # BLD AUTO: 6.56 10*3/MM3 (ref 3.4–10.8)

## 2023-10-12 PROCEDURE — 90662 IIV NO PRSV INCREASED AG IM: CPT | Performed by: FAMILY MEDICINE

## 2023-10-12 PROCEDURE — 3077F SYST BP >= 140 MM HG: CPT | Performed by: FAMILY MEDICINE

## 2023-10-12 PROCEDURE — 73030 X-RAY EXAM OF SHOULDER: CPT

## 2023-10-12 PROCEDURE — 3079F DIAST BP 80-89 MM HG: CPT | Performed by: FAMILY MEDICINE

## 2023-10-12 PROCEDURE — 99214 OFFICE O/P EST MOD 30 MIN: CPT | Performed by: FAMILY MEDICINE

## 2023-10-12 PROCEDURE — G0008 ADMIN INFLUENZA VIRUS VAC: HCPCS | Performed by: FAMILY MEDICINE

## 2023-10-12 RX ORDER — SERTRALINE HYDROCHLORIDE 100 MG/1
100 TABLET, FILM COATED ORAL DAILY
Qty: 90 TABLET | Refills: 0 | Status: SHIPPED | OUTPATIENT
Start: 2023-10-12

## 2023-10-12 NOTE — PROGRESS NOTES
"Chief Complaint   Patient presents with    [facility] Care     Wants to see about being referred out to ortho for a fall she has in July       Subjective      Dorothy Zavala is a 78 y.o. who presents for hypothyroidism, hypertension, primary insomnia.  She reports her chronic conditions as stable.  However review of her chart shows in July her previous PCP intended for the patient to stop her quetiapine because Ambien CR was increased to maximum dosing of 12-1/2 mg.  Patient did not make this change.    Also of note patient does not really know what most of her medications are for.    In addition to the above in patient's problem list a history of B-cell lymphoma is listed which patient cannot provide any details of    She has an additional complaint of right shoulder pain from a fall over the summer.  She completed physical therapy with some improvement but still has significant pain with range of motion.  She would like to see an orthopedist as her physical therapist suggested a corticosteroid injection may benefit her.        The following portions of the patient's history were reviewed and updated as appropriate: allergies, current medications, past family history, past medical history, past social history, past surgical history, and problem list.    Review of Systems    Objective   Vital Signs:  /80   Pulse 83   Temp 97.8 øF (36.6 øC)   Resp 16   Ht 167.6 cm (66\")   Wt 91.2 kg (201 lb)   SpO2 97%   BMI 32.44 kg/mý               Physical Exam  Vitals reviewed.   Constitutional:       Appearance: Normal appearance.   Cardiovascular:      Rate and Rhythm: Normal rate and regular rhythm.      Pulses: Normal pulses.      Heart sounds: Normal heart sounds.   Musculoskeletal:      Right lower leg: No edema.      Left lower leg: No edema.   Neurological:      Mental Status: She is alert.          Result Review                     Assessment and Plan  Diagnoses and all orders for this visit:    1. Primary " hypertension (Primary)  -     Comprehensive Metabolic Panel  -     CBC & Differential  -     Lipid Panel    2. Mild episode of recurrent major depressive disorder  -     sertraline (ZOLOFT) 100 MG tablet; Take 1 tablet by mouth Daily.  Dispense: 90 tablet; Refill: 0    3. Chronic right shoulder pain  -     XR shoulder 2+ vw right  -     Ambulatory Referral to Orthopedic Surgery    4. Hypothyroidism, unspecified type  -     TSH    5. Need for vaccination  -     Fluzone High-Dose 65+yrs (5501-3189)    6. Mild cognitive impairment    Plan  1.  Blood pressure is controlled.  Continue nifedipine.  Surveillance labs ordered  2.  Hypothyroidism is likely controlled.  Surveillance TSH is needed.  Medication will be refilled once lab results are obtained  3.  Patient will obtain an x-ray of the right shoulder and she will be referred to [facility] orthopedics for further evaluation  4.  Regarding her insomnia she will stop quetiapine and continue Ambien CR 12.5 mg  5.  Increase patient's sertraline to 100 mg to address her anxiety disorder.  Continue hydroxyzine for now.  6.  Patient has cognitive impairment.  She lives alone with family in [place].  This will be monitored closely.        Follow Up  Return in about 6 weeks (around 11/23/2023) for Recheck.  Patient was given instructions and counseling regarding her condition or for health maintenance advice. Please see specific information pulled into the AVS if appropriate.

## 2023-10-13 DIAGNOSIS — E03.9 HYPOTHYROIDISM, UNSPECIFIED TYPE: Primary | ICD-10-CM

## 2023-10-13 RX ORDER — LEVOTHYROXINE SODIUM 88 UG/1
88 TABLET ORAL DAILY
Qty: 90 TABLET | Refills: 0 | Status: SHIPPED | OUTPATIENT
Start: 2023-10-13

## 2023-10-19 ENCOUNTER — PATIENT ROUNDING (BHMG ONLY) (OUTPATIENT)
Dept: FAMILY MEDICINE CLINIC | Facility: CLINIC | Age: 78
End: 2023-10-19
Payer: MEDICARE

## 2023-10-20 RX ORDER — ATORVASTATIN CALCIUM 80 MG/1
80 TABLET, FILM COATED ORAL NIGHTLY
Qty: 30 TABLET | Refills: 0 | OUTPATIENT
Start: 2023-10-20

## 2023-11-03 RX ORDER — ATORVASTATIN CALCIUM 80 MG/1
80 TABLET, FILM COATED ORAL NIGHTLY
Qty: 30 TABLET | Refills: 0 | OUTPATIENT
Start: 2023-11-03

## 2023-12-05 DIAGNOSIS — F51.01 PRIMARY INSOMNIA: ICD-10-CM

## 2023-12-05 NOTE — TELEPHONE ENCOUNTER
Caller: Dorothy Zavala    Relationship: Self    Best call back number: [phone]     Requested Prescriptions:   Requested Prescriptions     Pending Prescriptions Disp Refills    zolpidem CR (AMBIEN CR) 12.5 MG CR tablet 30 tablet 2     Sig: Take 1 tablet by mouth At Night As Needed for Sleep.        Pharmacy where request should be sent: [facility] - [place] - [address] [facility] AT [address]AL TO [facility] - [phone]  - [phone] FX     Last office visit with prescribing clinician: 10/12/2023   Last telemedicine visit with prescribing clinician: Visit date not found   Next office visit with prescribing clinician: Visit date not found     Additional details provided by patient:     Does the patient have less than a 3 day supply:  [] Yes  [x] No    Would you like a call back once the refill request has been completed: [] Yes [x] No    If the office needs to give you a call back, can they leave a voicemail: [] Yes [x] No    Esteban Farnsworth Rep   12/05/23 15:44 EST

## 2023-12-06 RX ORDER — ZOLPIDEM TARTRATE 12.5 MG/1
12.5 TABLET, FILM COATED, EXTENDED RELEASE ORAL NIGHTLY PRN
Qty: 30 TABLET | Refills: 2 | Status: SHIPPED | OUTPATIENT
Start: 2023-12-06

## 2023-12-07 RX ORDER — HYDROXYZINE 50 MG/1
50 TABLET, FILM COATED ORAL EVERY MORNING
Qty: 90 TABLET | Refills: 0 | Status: SHIPPED | OUTPATIENT
Start: 2023-12-07

## 2023-12-24 DIAGNOSIS — E03.9 HYPOTHYROIDISM, UNSPECIFIED TYPE: ICD-10-CM

## 2023-12-26 RX ORDER — LEVOTHYROXINE SODIUM 88 UG/1
88 TABLET ORAL DAILY
Qty: 90 TABLET | Refills: 0 | Status: SHIPPED | OUTPATIENT
Start: 2023-12-26

## 2024-01-06 DIAGNOSIS — F33.0 MILD EPISODE OF RECURRENT MAJOR DEPRESSIVE DISORDER: ICD-10-CM

## 2024-01-08 ENCOUNTER — TELEPHONE (OUTPATIENT)
Dept: FAMILY MEDICINE CLINIC | Facility: CLINIC | Age: 79
End: 2024-01-08
Payer: MEDICARE

## 2024-01-08 DIAGNOSIS — F33.0 MILD EPISODE OF RECURRENT MAJOR DEPRESSIVE DISORDER: ICD-10-CM

## 2024-01-08 RX ORDER — SERTRALINE HYDROCHLORIDE 100 MG/1
100 TABLET, FILM COATED ORAL DAILY
Qty: 30 TABLET | Refills: 0 | Status: SHIPPED | OUTPATIENT
Start: 2024-01-08

## 2024-01-08 RX ORDER — SERTRALINE HYDROCHLORIDE 100 MG/1
100 TABLET, FILM COATED ORAL DAILY
Qty: 90 TABLET | Refills: 0 | Status: CANCELLED | OUTPATIENT
Start: 2024-01-08

## 2024-01-08 NOTE — TELEPHONE ENCOUNTER
Incoming Refill Request      Medication requested (name and dose): sertraline (ZOLOFT) 100 MG tablet     [facility]    Additional details provided by patient: N/A    Best call back number: [phone]    Does the patient have less than a 3 day supply:  [x] Yes  [] No    Esteban Lyle Rep  01/08/24, 11:23 EST

## 2024-01-19 DIAGNOSIS — F51.01 PRIMARY INSOMNIA: ICD-10-CM

## 2024-01-19 DIAGNOSIS — M54.50 CHRONIC MIDLINE LOW BACK PAIN, UNSPECIFIED WHETHER SCIATICA PRESENT: ICD-10-CM

## 2024-01-19 DIAGNOSIS — F33.0 MILD EPISODE OF RECURRENT MAJOR DEPRESSIVE DISORDER: ICD-10-CM

## 2024-01-19 DIAGNOSIS — G89.29 CHRONIC MIDLINE LOW BACK PAIN, UNSPECIFIED WHETHER SCIATICA PRESENT: ICD-10-CM

## 2024-01-19 RX ORDER — ZOLPIDEM TARTRATE 12.5 MG/1
12.5 TABLET, FILM COATED, EXTENDED RELEASE ORAL NIGHTLY PRN
Qty: 30 TABLET | Refills: 2 | Status: SHIPPED | OUTPATIENT
Start: 2024-01-19

## 2024-01-19 RX ORDER — ZOLPIDEM TARTRATE 12.5 MG/1
12.5 TABLET, FILM COATED, EXTENDED RELEASE ORAL NIGHTLY PRN
Qty: 30 TABLET | Refills: 2 | Status: SHIPPED | OUTPATIENT
Start: 2024-01-19 | End: 2024-01-19 | Stop reason: SDUPTHER

## 2024-01-19 RX ORDER — MELOXICAM 7.5 MG/1
7.5 TABLET ORAL DAILY
Qty: 30 TABLET | Refills: 3 | Status: SHIPPED | OUTPATIENT
Start: 2024-01-19

## 2024-01-19 RX ORDER — SERTRALINE HYDROCHLORIDE 100 MG/1
100 TABLET, FILM COATED ORAL DAILY
Qty: 30 TABLET | Refills: 2 | Status: SHIPPED | OUTPATIENT
Start: 2024-01-19

## 2024-01-19 NOTE — TELEPHONE ENCOUNTER
She was supposed to follow-up after her October visit due to medication changes that we made.  I would like her to schedule an appointment in the next 1 to 2 months

## 2024-01-19 NOTE — TELEPHONE ENCOUNTER
Caller: Dorothy Zavala    Relationship: Self    Best call back number: [phone]     Requested Prescriptions:   Requested Prescriptions     Pending Prescriptions Disp Refills    zolpidem CR (AMBIEN CR) 12.5 MG CR tablet 30 tablet 2     Sig: Take 1 tablet by mouth At Night As Needed for Sleep.    meloxicam (Mobic) 7.5 MG tablet 30 tablet 3     Sig: Take 1 tablet by mouth Daily.    sertraline (ZOLOFT) 100 MG tablet 30 tablet 0     Sig: Take 1 tablet by mouth Daily.      [facility] be [facility] [place] [facility] - [address] [facility] AT PORTAL TO REGISTERED [facility] S[facility] - [phone]  - [phone] FX     Last office visit with prescribing clinician: 10/12/2023   Last telemedicine visit with prescribing clinician: Visit date not found   Next office visit with prescribing clinician: Visit date not found     Additional details provided by patient: PATIENT IS COMPLETELY OUT OF THE ZOLPIDEM PRESCRIPTION, HAS 1 WEEK LEFT ON THE MELOXICAM PRESCRIPTION, AND HAS AT LEAST 3 WEEKS REMAINING ON THE SERTRALINE PRESCRIPTION.    PLEASE NOTIFY PATIENT WHEN THESE PRESCRIPTIONS HAVE BEEN CALLED IN OR IF THERE ARE ANY QUESTIONS/CONCERNS.     Does the patient have less than a 3 day supply:  [x] Yes  [] No    Would you like a call back once the refill request has been completed: [x] Yes [] No    If the office needs to give you a call back, can they leave a voicemail: [x] Yes [] No    Esteban Ramirez Rep   01/19/24 09:54 EST

## 2024-01-25 DIAGNOSIS — M54.50 CHRONIC MIDLINE LOW BACK PAIN, UNSPECIFIED WHETHER SCIATICA PRESENT: ICD-10-CM

## 2024-01-25 DIAGNOSIS — G89.29 CHRONIC MIDLINE LOW BACK PAIN, UNSPECIFIED WHETHER SCIATICA PRESENT: ICD-10-CM

## 2024-01-25 RX ORDER — MELOXICAM 7.5 MG/1
7.5 TABLET ORAL DAILY
Qty: 30 TABLET | Refills: 3 | OUTPATIENT
Start: 2024-01-25

## 2024-01-26 ENCOUNTER — TELEPHONE (OUTPATIENT)
Dept: FAMILY MEDICINE CLINIC | Facility: CLINIC | Age: 79
End: 2024-01-26
Payer: MEDICARE

## 2024-01-26 DIAGNOSIS — G89.29 CHRONIC MIDLINE LOW BACK PAIN, UNSPECIFIED WHETHER SCIATICA PRESENT: ICD-10-CM

## 2024-01-26 DIAGNOSIS — M54.50 CHRONIC MIDLINE LOW BACK PAIN, UNSPECIFIED WHETHER SCIATICA PRESENT: ICD-10-CM

## 2024-01-26 RX ORDER — MELOXICAM 7.5 MG/1
7.5 TABLET ORAL DAILY
Qty: 30 TABLET | Refills: 3 | OUTPATIENT
Start: 2024-01-26

## 2024-01-26 RX ORDER — MELOXICAM 7.5 MG/1
7.5 TABLET ORAL DAILY
Qty: 30 TABLET | Refills: 3 | Status: SHIPPED | OUTPATIENT
Start: 2024-01-26

## 2024-01-26 RX ORDER — MELOXICAM 7.5 MG/1
7.5 TABLET ORAL DAILY
Qty: 30 TABLET | Refills: 3 | Status: CANCELLED | OUTPATIENT
Start: 2024-01-26

## 2024-01-26 NOTE — TELEPHONE ENCOUNTER
Caller: Dorothy Zavala    Relationship: Self    Best call back number: [phone]    Requested Prescriptions:   Requested Prescriptions     Pending Prescriptions Disp Refills    meloxicam (Mobic) 7.5 MG tablet 30 tablet 3     Sig: Take 1 tablet by mouth Daily.        Pharmacy where request should be sent: [facility][id number] - [address] AT [place] [address] - [phone] [facility] [phone] FX     Last office visit with prescribing clinician: 10/12/2023   Last telemedicine visit with prescribing clinician: Visit date not found   Next office visit with prescribing clinician: 3/19/2024     Additional details provided by patient: HAS A FEW DAYS LEFT     Does the patient have less than a 3 day supply:  [x] Yes  [] No    Would you like a call back once the refill request has been completed: [x] Yes [] No    If the office needs to give you a call back, can they leave a voicemail: [x] Yes [] No    Esteban Lopez Rep   01/26/24 10:21 EST

## 2024-01-26 NOTE — TELEPHONE ENCOUNTER
Patient informed this medication was sent to [facility] on 1/19. Pt will contact them.

## 2024-01-26 NOTE — TELEPHONE ENCOUNTER
Patient called to speak with Kayla again. She said she just wanted to let us know that this medication usually comes from [facility] but she is no longer working at that practice anymore.

## 2024-01-26 NOTE — TELEPHONE ENCOUNTER
Pt called [facility] instead of [facility],  Mail order seems to be a problem every time. [facility]. 1/26/24 from here on out.  Note from Pharmacy was stating further refills come from [facility]?

## 2024-02-07 ENCOUNTER — PATIENT MESSAGE (OUTPATIENT)
Dept: FAMILY MEDICINE CLINIC | Facility: CLINIC | Age: 79
End: 2024-02-07
Payer: MEDICARE

## 2024-02-07 DIAGNOSIS — L98.9 SKIN LESIONS: Primary | ICD-10-CM

## 2024-02-08 NOTE — TELEPHONE ENCOUNTER
From: Dorothy Day  Sent: 2/7/2024 6:01 PM EST  To: [facility] Clinical Pool  Subject: Dermatologist needed     I need to get the doctor to take off some moles and let me know if any of them is cancerous? There’s one I’m really concerned about?

## 2024-02-09 DIAGNOSIS — F33.0 MILD EPISODE OF RECURRENT MAJOR DEPRESSIVE DISORDER: ICD-10-CM

## 2024-02-12 RX ORDER — SERTRALINE HYDROCHLORIDE 100 MG/1
100 TABLET, FILM COATED ORAL DAILY
Qty: 30 TABLET | Refills: 2 | Status: SHIPPED | OUTPATIENT
Start: 2024-02-12

## 2024-03-07 RX ORDER — HYDROXYZINE 50 MG/1
50 TABLET, FILM COATED ORAL EVERY MORNING
Qty: 90 TABLET | Refills: 0 | OUTPATIENT
Start: 2024-03-07

## 2024-03-18 RX ORDER — HYDROXYZINE 50 MG/1
50 TABLET, FILM COATED ORAL EVERY MORNING
Qty: 90 TABLET | Refills: 0 | OUTPATIENT
Start: 2024-03-18

## 2024-03-26 ENCOUNTER — OFFICE VISIT (OUTPATIENT)
Dept: FAMILY MEDICINE CLINIC | Facility: CLINIC | Age: 79
End: 2024-03-26
Payer: MEDICARE

## 2024-03-26 VITALS
TEMPERATURE: 97.1 F | RESPIRATION RATE: 18 BRPM | OXYGEN SATURATION: 97 % | BODY MASS INDEX: 32.88 KG/M2 | HEIGHT: 66 IN | HEART RATE: 68 BPM | WEIGHT: 204.6 LBS | DIASTOLIC BLOOD PRESSURE: 72 MMHG | SYSTOLIC BLOOD PRESSURE: 130 MMHG

## 2024-03-26 DIAGNOSIS — F32.A ANXIETY AND DEPRESSION: Chronic | ICD-10-CM

## 2024-03-26 DIAGNOSIS — I10 ESSENTIAL HYPERTENSION: ICD-10-CM

## 2024-03-26 DIAGNOSIS — F41.9 ANXIETY AND DEPRESSION: Chronic | ICD-10-CM

## 2024-03-26 DIAGNOSIS — F51.01 PRIMARY INSOMNIA: ICD-10-CM

## 2024-03-26 DIAGNOSIS — E03.9 HYPOTHYROIDISM, UNSPECIFIED TYPE: Primary | Chronic | ICD-10-CM

## 2024-03-26 DIAGNOSIS — F33.0 MILD EPISODE OF RECURRENT MAJOR DEPRESSIVE DISORDER: ICD-10-CM

## 2024-03-26 RX ORDER — HYDROXYZINE 50 MG/1
50 TABLET, FILM COATED ORAL 2 TIMES DAILY PRN
Qty: 45 TABLET | Refills: 3 | Status: SHIPPED | OUTPATIENT
Start: 2024-03-26

## 2024-03-26 RX ORDER — NIFEDIPINE 30 MG
30 TABLET, EXTENDED RELEASE ORAL
Qty: 30 TABLET | Refills: 10 | Status: SHIPPED | OUTPATIENT
Start: 2024-03-26

## 2024-03-26 RX ORDER — LEVOTHYROXINE SODIUM 88 UG/1
88 TABLET ORAL DAILY
Qty: 30 TABLET | Refills: 3 | Status: SHIPPED | OUTPATIENT
Start: 2024-03-26

## 2024-03-26 RX ORDER — SERTRALINE HYDROCHLORIDE 100 MG/1
100 TABLET, FILM COATED ORAL DAILY
Qty: 30 TABLET | Refills: 3 | Status: SHIPPED | OUTPATIENT
Start: 2024-03-26

## 2024-03-26 RX ORDER — ZOLPIDEM TARTRATE 12.5 MG/1
12.5 TABLET, FILM COATED, EXTENDED RELEASE ORAL NIGHTLY PRN
Qty: 30 TABLET | Refills: 3 | Status: SHIPPED | OUTPATIENT
Start: 2024-03-26

## 2024-03-26 NOTE — PROGRESS NOTES
"Chief Complaint   Patient presents with    Follow-up     Requesting increase of hydroxyzine.        Subjective      Dorothy Zavala is a 78 y.o. who presents for chronic care of hypertension, hypothyroidism, anxiety.  Patient needs refills on multiple medications.  She does not monitor her blood pressure at home.  At patient's last visit levothyroxine had to be reduced.  This did not seem to impact her anxiety in any way.  Sertraline was increased and she noticed some improvement in mood as well.  However she does ask for a change in her hydroxyzine prescription to allow for a second dose during the day if needed which she estimates would be about 3 times per week.    The following portions of the patient's history were reviewed and updated as appropriate: allergies, current medications, past family history, past medical history, past social history, past surgical history, and problem list.    Review of Systems    Objective   Vital Signs:  /72   Pulse 68   Temp 97.1 °F (36.2 °C)   Resp 18   Ht 167.6 cm (66\")   Wt 92.8 kg (204 lb 9.6 oz)   SpO2 97%   BMI 33.02 kg/m²               Physical Exam  Vitals reviewed.   Constitutional:       Appearance: Normal appearance.   Cardiovascular:      Rate and Rhythm: Normal rate and regular rhythm.      Pulses: Normal pulses.      Heart sounds: Normal heart sounds.   Pulmonary:      Effort: Pulmonary effort is normal.      Breath sounds: Normal breath sounds.   Neurological:      Mental Status: She is alert.   Psychiatric:         Mood and Affect: Mood normal.         Behavior: Behavior normal.         Thought Content: Thought content normal.          Result Review                     Assessment and Plan  Diagnoses and all orders for this visit:    1. Hypothyroidism, unspecified type (Primary)  -     levothyroxine (SYNTHROID, LEVOTHROID) 88 MCG tablet; Take 1 tablet by mouth Daily.  Dispense: 30 tablet; Refill: 3  -     TSH; Future    2. Anxiety and depression  -     " hydrOXYzine (ATARAX) 50 MG tablet; Take 1 tablet by mouth 2 (Two) Times a Day As Needed for Anxiety. Appointment needed for further refills.  Dispense: 45 tablet; Refill: 3    3. Mild episode of recurrent major depressive disorder  -     sertraline (ZOLOFT) 100 MG tablet; Take 1 tablet by mouth Daily.  Dispense: 30 tablet; Refill: 3    4. Primary insomnia  -     zolpidem CR (AMBIEN CR) 12.5 MG CR tablet; Take 1 tablet by mouth At Night As Needed for Sleep.  Dispense: 30 tablet; Refill: 3    5. Essential hypertension  -     NIFEdipine CC (ADALAT CC) 30 MG 24 hr tablet; Take 1 tablet by mouth Daily.  Dispense: 30 tablet; Refill: 10            Follow Up  Return in about 4 months (around 7/12/2024) for Medicare Wellness.  Patient was given instructions and counseling regarding her condition or for health maintenance advice. Please see specific information pulled into the AVS if appropriate.

## 2024-03-26 NOTE — ASSESSMENT & PLAN NOTE
Condition is stable.  Hydroxyzine will be increased to a second dose as needed.  Continue sertraline 100 mg daily.

## 2024-04-05 ENCOUNTER — LAB (OUTPATIENT)
Dept: FAMILY MEDICINE CLINIC | Facility: CLINIC | Age: 79
End: 2024-04-05
Payer: MEDICARE

## 2024-04-12 DIAGNOSIS — F51.01 PRIMARY INSOMNIA: ICD-10-CM

## 2024-04-15 NOTE — TELEPHONE ENCOUNTER
Rx Refill Note  Requested Prescriptions     Pending Prescriptions Disp Refills    zolpidem CR (AMBIEN CR) 12.5 MG CR tablet [Pharmacy Med Name: ZOLPIDEM TART ER 12.5 MG TAB] 30 tablet 2     Sig: TAKE 1 TABLET BY MOUTH AT NIGHT AS NEEDED FOR SLEEP.      Last office visit with prescribing clinician: 3/26/2024   Last telemedicine visit with prescribing clinician: Visit date not found   Next office visit with prescribing clinician: 7/26/2024                         Would you like a call back once the refill request has been completed: [] Yes [] No    If the office needs to give you a call back, can they leave a voicemail: [] Yes [] No    Tonia Malik MA  04/15/24, 09:09 EDT

## 2024-04-16 RX ORDER — ZOLPIDEM TARTRATE 12.5 MG/1
TABLET, FILM COATED, EXTENDED RELEASE ORAL
Qty: 30 TABLET | Refills: 4 | Status: SHIPPED | OUTPATIENT
Start: 2024-04-16

## 2024-04-16 RX ORDER — ZOLPIDEM TARTRATE 10 MG/1
10 TABLET ORAL NIGHTLY PRN
OUTPATIENT
Start: 2024-04-16

## 2024-06-04 DIAGNOSIS — G89.29 CHRONIC MIDLINE LOW BACK PAIN, UNSPECIFIED WHETHER SCIATICA PRESENT: ICD-10-CM

## 2024-06-04 DIAGNOSIS — M54.50 CHRONIC MIDLINE LOW BACK PAIN, UNSPECIFIED WHETHER SCIATICA PRESENT: ICD-10-CM

## 2024-06-04 RX ORDER — MELOXICAM 7.5 MG/1
7.5 TABLET ORAL DAILY
Qty: 30 TABLET | Refills: 3 | Status: SHIPPED | OUTPATIENT
Start: 2024-06-04

## 2024-06-19 DIAGNOSIS — F33.0 MILD EPISODE OF RECURRENT MAJOR DEPRESSIVE DISORDER: ICD-10-CM

## 2024-06-19 RX ORDER — SERTRALINE HYDROCHLORIDE 100 MG/1
100 TABLET, FILM COATED ORAL DAILY
Qty: 30 TABLET | Refills: 2 | Status: SHIPPED | OUTPATIENT
Start: 2024-06-19

## 2024-06-21 DIAGNOSIS — E03.9 HYPOTHYROIDISM, UNSPECIFIED TYPE: Chronic | ICD-10-CM

## 2024-06-21 RX ORDER — LEVOTHYROXINE SODIUM 88 UG/1
88 TABLET ORAL DAILY
Qty: 90 TABLET | Refills: 1 | Status: SHIPPED | OUTPATIENT
Start: 2024-06-21

## 2024-07-26 ENCOUNTER — OFFICE VISIT (OUTPATIENT)
Dept: FAMILY MEDICINE CLINIC | Facility: CLINIC | Age: 79
End: 2024-07-26
Payer: MEDICARE

## 2024-07-26 VITALS
OXYGEN SATURATION: 98 % | WEIGHT: 216.2 LBS | HEART RATE: 88 BPM | BODY MASS INDEX: 34.75 KG/M2 | RESPIRATION RATE: 20 BRPM | DIASTOLIC BLOOD PRESSURE: 68 MMHG | HEIGHT: 66 IN | TEMPERATURE: 97.5 F | SYSTOLIC BLOOD PRESSURE: 145 MMHG

## 2024-07-26 DIAGNOSIS — G89.29 CHRONIC MIDLINE LOW BACK PAIN, UNSPECIFIED WHETHER SCIATICA PRESENT: ICD-10-CM

## 2024-07-26 DIAGNOSIS — F51.01 PRIMARY INSOMNIA: ICD-10-CM

## 2024-07-26 DIAGNOSIS — F33.0 MILD EPISODE OF RECURRENT MAJOR DEPRESSIVE DISORDER: ICD-10-CM

## 2024-07-26 DIAGNOSIS — Z59.9 FINANCIAL DIFFICULTY: ICD-10-CM

## 2024-07-26 DIAGNOSIS — Z00.00 MEDICARE ANNUAL WELLNESS VISIT, SUBSEQUENT: Primary | ICD-10-CM

## 2024-07-26 DIAGNOSIS — Z00.00 ANNUAL PHYSICAL EXAM: ICD-10-CM

## 2024-07-26 DIAGNOSIS — I10 ESSENTIAL HYPERTENSION: ICD-10-CM

## 2024-07-26 DIAGNOSIS — E03.9 HYPOTHYROIDISM, UNSPECIFIED TYPE: Chronic | ICD-10-CM

## 2024-07-26 DIAGNOSIS — F32.A ANXIETY AND DEPRESSION: Chronic | ICD-10-CM

## 2024-07-26 DIAGNOSIS — F41.9 ANXIETY AND DEPRESSION: Chronic | ICD-10-CM

## 2024-07-26 DIAGNOSIS — M54.50 CHRONIC MIDLINE LOW BACK PAIN, UNSPECIFIED WHETHER SCIATICA PRESENT: ICD-10-CM

## 2024-07-26 RX ORDER — ZOLPIDEM TARTRATE 12.5 MG/1
TABLET, FILM COATED, EXTENDED RELEASE ORAL
Qty: 90 TABLET | Refills: 1 | Status: SHIPPED | OUTPATIENT
Start: 2024-07-26

## 2024-07-26 RX ORDER — MELOXICAM 7.5 MG/1
7.5 TABLET ORAL DAILY
Qty: 90 TABLET | Refills: 3 | Status: SHIPPED | OUTPATIENT
Start: 2024-07-26

## 2024-07-26 RX ORDER — SERTRALINE HYDROCHLORIDE 100 MG/1
100 TABLET, FILM COATED ORAL DAILY
Qty: 90 TABLET | Refills: 2 | Status: SHIPPED | OUTPATIENT
Start: 2024-07-26

## 2024-07-26 RX ORDER — HYDROXYZINE 50 MG/1
50 TABLET, FILM COATED ORAL 2 TIMES DAILY PRN
Qty: 30 TABLET | Refills: 3 | Status: SHIPPED | OUTPATIENT
Start: 2024-07-26

## 2024-07-26 RX ORDER — LEVOTHYROXINE SODIUM 88 UG/1
88 TABLET ORAL DAILY
Qty: 90 TABLET | Refills: 1 | Status: SHIPPED | OUTPATIENT
Start: 2024-07-26

## 2024-07-26 RX ORDER — NIFEDIPINE 30 MG
30 TABLET, EXTENDED RELEASE ORAL
Qty: 90 TABLET | Refills: 3 | Status: SHIPPED | OUTPATIENT
Start: 2024-07-26

## 2024-07-26 SDOH — ECONOMIC STABILITY - INCOME SECURITY: PROBLEM RELATED TO HOUSING AND ECONOMIC CIRCUMSTANCES, UNSPECIFIED: Z59.9

## 2024-07-26 NOTE — PROGRESS NOTES
Subjective   The ABCs of the Annual Wellness Visit  Medicare Wellness Visit      Dorothy Zavala is a 79 y.o. patient who presents for a Medicare Wellness Visit.    The following portions of the patient's history were reviewed and   updated as appropriate: allergies, current medications, past family history, past medical history, past social history, past surgical history, and problem list.    Compared to one year ago, the patient's physical   health is worse.  Patient has had 12 pound weight gain in the last year  Compared to one year ago, the patient's mental   health is the same.    Patient reports increase in stress and anxiety due to financial difficulties and she is currently living in a motel as she is unable to afford an apartment.  She is actively seeking but due to upfront costs is having difficulties    Recent Hospitalizations:  She was not admitted to the hospital during the last year.     Current Medical Providers:  Patient Care Team:  Silas Fraser MD as PCP - General (Family Medicine)    Outpatient Medications Prior to Visit   Medication Sig Dispense Refill    acetaminophen (TYLENOL) 325 MG tablet Take 2 tablets by mouth Every 6 (Six) Hours As Needed for Mild Pain or Moderate Pain.      aspirin 81 MG chewable tablet Chew 1 tablet Daily. 30 tablet 0    omeprazole (priLOSEC) 40 MG capsule Take 1 capsule by mouth Daily.      hydrOXYzine (ATARAX) 50 MG tablet Take 1 tablet by mouth 2 (Two) Times a Day As Needed for Anxiety. Appointment needed for further refills. 45 tablet 3    levothyroxine (SYNTHROID, LEVOTHROID) 88 MCG tablet TAKE 1 TABLET BY MOUTH EVERY DAY 90 tablet 1    meloxicam (MOBIC) 7.5 MG tablet TAKE 1 TABLET BY MOUTH EVERY DAY 30 tablet 3    NIFEdipine CC (ADALAT CC) 30 MG 24 hr tablet Take 1 tablet by mouth Daily. 30 tablet 10    sertraline (ZOLOFT) 100 MG tablet TAKE 1 TABLET BY MOUTH EVERY DAY 30 tablet 2    zolpidem CR (AMBIEN CR) 12.5 MG CR tablet Take 1 table by mouth nightly for  "lseep 30 tablet 4     No facility-administered medications prior to visit.     No opioid medication identified on active medication list. I have reviewed chart for other potential  high risk medication/s and harmful drug interactions in the elderly.      Aspirin is on active medication list. Aspirin use is not indicated based on review of current medical condition/s. Risk of harm outweighs potential benefits. Patient instructed to discontinue this medication.  .      Patient Active Problem List   Diagnosis    Hypertension    Anxiety and depression    Hypothyroidism    Arthritis    COPD    B-cell lymphoma     Frequent falls    Left wrist fracture    Illicit drug use (UDS + opiates, BZDs, and TCAs)    Primary insomnia     Advance Care Planning (Click this link to access ACP Navigator)  Advance Directive is not on file.  ACP discussion was held with the patient during this visit. Patient has an advance directive (not in EMR), copy requested.        Objective   Vitals:    07/26/24 1612   BP: 145/68   Pulse: 88   Resp: 20   Temp: 97.5 °F (36.4 °C)   SpO2: 98%   Weight: 98.1 kg (216 lb 3.2 oz)   Height: 167.6 cm (66\")   PainSc:   6   PainLoc: Back       Estimated body mass index is 34.9 kg/m² as calculated from the following:    Height as of this encounter: 167.6 cm (66\").    Weight as of this encounter: 98.1 kg (216 lb 3.2 oz).             Does the patient have evidence of cognitive impairment? No                                                                                                Health  Risk Assessment    Smoking Status:  Social History     Tobacco Use   Smoking Status Never    Passive exposure: Never   Smokeless Tobacco Never   Tobacco Comments    NEVER     Alcohol Consumption:  Social History     Substance and Sexual Activity   Alcohol Use Never     Fall Risk Screen:  STEADI Fall Risk Assessment was completed, and patient is at LOW risk for falls.Assessment completed on:7/26/2024    Depression " Screenin/26/2024     4:18 PM   PHQ-2/PHQ-9 Depression Screening   Little Interest or Pleasure in Doing Things 0-->not at all   Feeling Down, Depressed or Hopeless 1-->several days   PHQ-9: Brief Depression Severity Measure Score 1     Health Habits and Functional and Cognitive Screenin/26/2024     4:17 PM   Functional & Cognitive Status   Do you have difficulty preparing food and eating? No   Do you have difficulty bathing yourself, getting dressed or grooming yourself? No   Do you have difficulty using the toilet? No   Do you have difficulty moving around from place to place? No   Do you have trouble with steps or getting out of a bed or a chair? No   Current Diet Unhealthy Diet   Dental Exam Not up to date   Eye Exam Up to date   Exercise (times per week) 0 times per week   Current Exercises Include No Regular Exercise   Do you need help using the phone?  No   Are you deaf or do you have serious difficulty hearing?  Yes   Do you need help to go to places out of walking distance? No   Do you need help shopping? No   Do you need help preparing meals?  No   Do you need help with housework?  No   Do you need help with laundry? No   Do you need help taking your medications? No   Do you need help managing money? No   Do you ever drive or ride in a car without wearing a seat belt? No   Have you felt unusual stress, anger or loneliness in the last month? Yes   Who do you live with? Alone   If you need help, do you have trouble finding someone available to you? No   Have you been bothered in the last four weeks by sexual problems? No   Do you have difficulty concentrating, remembering or making decisions? No             Age-appropriate Screening Schedule:  Refer to the list below for future screening recommendations based on patient's age, sex and/or medical conditions. Orders for these recommended tests are listed in the plan section. The patient has been provided with a written plan.    Health  "Maintenance List  Health Maintenance   Topic Date Due    ZOSTER VACCINE (1 of 2) Never done    TDAP/TD VACCINES (2 - Tdap) 03/04/2007    RSV Vaccine - Adults (1 - 1-dose 60+ series) Never done    COVID-19 Vaccine (6 - 2023-24 season) 10/15/2024 (Originally 9/1/2023)    INFLUENZA VACCINE  08/01/2024    LIPID PANEL  10/12/2024    DXA SCAN  04/19/2025    ANNUAL WELLNESS VISIT  07/26/2025    BMI FOLLOWUP  07/26/2025    HEPATITIS C SCREENING  Completed    Pneumococcal Vaccine 65+  Completed                                                                                                                                                CMS Preventative Services Quick Reference  Risk Factors Identified During Encounter  Depression/Dysphoria: Referral to Social Work  Immunizations Discussed/Encouraged: Influenza, COVID19, and RSV (Respiratory Syncytial Virus)  Dental Screening Recommended  Vision Screening Recommended    The above risks/problems have been discussed with the patient.  Pertinent information has been shared with the patient in the After Visit Summary.  An After Visit Summary and PPPS were made available to the patient.    Follow Up:   Next Medicare Wellness visit to be scheduled in 1 year.         Additional E&M Note during same encounter follows:  Patient has additional, significant, and separately identifiable condition(s)/problem(s) that require work above and beyond the Medicare Wellness Visit     Chief Complaint  Medicare Wellness-subsequent    Subjective   HPI  Dorothy is also being seen today for an annual adult preventative physical exam.     Review of Systems   Psychiatric/Behavioral:  Positive for sleep disturbance. The patient is nervous/anxious.               Objective   Vital Signs:  /68   Pulse 88   Temp 97.5 °F (36.4 °C)   Resp 20   Ht 167.6 cm (66\")   Wt 98.1 kg (216 lb 3.2 oz)   SpO2 98%   BMI 34.90 kg/m²   Physical Exam  Constitutional:       General: She is not in acute distress.    "  Appearance: Normal appearance. She is not ill-appearing.   HENT:      Head: Normocephalic and atraumatic.      Right Ear: Tympanic membrane and ear canal normal.      Left Ear: Tympanic membrane and ear canal normal.      Nose: Nose normal.      Mouth/Throat:      Mouth: Mucous membranes are moist.      Pharynx: Oropharynx is clear. No posterior oropharyngeal erythema.   Eyes:      Extraocular Movements: Extraocular movements intact.      Conjunctiva/sclera: Conjunctivae normal.      Pupils: Pupils are equal, round, and reactive to light.   Cardiovascular:      Rate and Rhythm: Normal rate and regular rhythm.      Pulses: Normal pulses.      Heart sounds: Normal heart sounds.   Pulmonary:      Effort: Pulmonary effort is normal. No respiratory distress.      Breath sounds: Normal breath sounds.   Abdominal:      General: Abdomen is flat. Bowel sounds are normal.      Palpations: Abdomen is soft.      Tenderness: There is no abdominal tenderness.   Musculoskeletal:         General: Normal range of motion.      Cervical back: Normal range of motion and neck supple.   Lymphadenopathy:      Cervical: No cervical adenopathy.   Skin:     General: Skin is warm and dry.      Capillary Refill: Capillary refill takes less than 2 seconds.   Neurological:      General: No focal deficit present.      Mental Status: She is alert and oriented to person, place, and time. Mental status is at baseline.      Cranial Nerves: No cranial nerve deficit.      Sensory: No sensory deficit.      Motor: No weakness.   Psychiatric:         Mood and Affect: Mood normal.         Behavior: Behavior normal.         Thought Content: Thought content normal.         Judgment: Judgment normal.         The following data was reviewed by: Silas Fraser MD on 07/26/2024:        Assessment and Plan Additional age appropriate preventative wellness advice topics were discussed during today's preventative wellness exam(some topics already addressed  during AWV portion of the note above):   Nutrition: Discussed nutrition plan with patient. Information shared in after visit summary. Goal is for a well balanced diet to enhance overall health.     Healthy Weight: Discussed current and goal BMI with patient. Steps to attain this goal discussed. Information shared in after visit summary.   Stress Management is needed  Social Work referral will be placed for resources for living as patient is currently living in a motel Medicare annual wellness visit, subsequent    Anxiety and depression    Hypothyroidism, unspecified type    Chronic midline low back pain, unspecified whether sciatica present    Essential hypertension    Mild episode of recurrent major depressive disorder    Primary insomnia    Annual physical exam    Financial difficulty      Orders Placed This Encounter   Procedures    Ambulatory Referral to Social Care Services (Amb Case Mgmt)     Referral Priority:   Routine     Referral Type:   Social Care Notification     Referral Reason:   Specialty Services Required     Requested Specialty:   Population Health     Number of Visits Requested:   1     New Medications Ordered This Visit   Medications    hydrOXYzine (ATARAX) 50 MG tablet     Sig: Take 1 tablet by mouth 2 (Two) Times a Day As Needed for Anxiety. Appointment needed for further refills.     Dispense:  30 tablet     Refill:  3    levothyroxine (SYNTHROID, LEVOTHROID) 88 MCG tablet     Sig: Take 1 tablet by mouth Daily.     Dispense:  90 tablet     Refill:  1    meloxicam (MOBIC) 7.5 MG tablet     Sig: Take 1 tablet by mouth Daily.     Dispense:  90 tablet     Refill:  3    NIFEdipine CC (ADALAT CC) 30 MG 24 hr tablet     Sig: Take 1 tablet by mouth Daily.     Dispense:  90 tablet     Refill:  3    sertraline (ZOLOFT) 100 MG tablet     Sig: Take 1 tablet by mouth Daily.     Dispense:  90 tablet     Refill:  2    zolpidem CR (AMBIEN CR) 12.5 MG CR tablet     Sig: Take 1 table by mouth  nightly for lseep     Dispense:  90 tablet     Refill:  1          Follow Up   Return in about 6 months (around 1/26/2025) for Next scheduled follow up.  Patient was given instructions and counseling regarding her condition or for health maintenance advice. Please see specific information pulled into the AVS if appropriate.

## 2024-07-29 ENCOUNTER — REFERRAL TRIAGE (OUTPATIENT)
Age: 79
End: 2024-07-29
Payer: MEDICARE

## 2024-07-31 ENCOUNTER — PATIENT OUTREACH (OUTPATIENT)
Age: 79
End: 2024-07-31
Payer: MEDICARE

## 2024-07-31 NOTE — OUTREACH NOTE
SW attempted an outreach and left a voicemail with callback information. SW will attempt again in two days.  Wendy OWENS -   Ambulatory Case Management    7/31/2024, 11:30 EDT

## 2024-08-15 ENCOUNTER — PATIENT OUTREACH (OUTPATIENT)
Age: 79
End: 2024-08-15
Payer: MEDICARE

## 2024-08-15 NOTE — OUTREACH NOTE
SW made four attempts to contact pt for follow up. SW left voicemail messages with contact information. SW will close referral but will re-open should pt call back.  Wendy OWENS -   Ambulatory Case Management    8/15/2024, 10:49 EDT

## 2024-11-22 ENCOUNTER — TELEPHONE (OUTPATIENT)
Dept: FAMILY MEDICINE CLINIC | Facility: CLINIC | Age: 79
End: 2024-11-22
Payer: MEDICARE

## 2024-11-22 DIAGNOSIS — F41.9 ANXIETY AND DEPRESSION: Chronic | ICD-10-CM

## 2024-11-22 DIAGNOSIS — F32.A ANXIETY AND DEPRESSION: Chronic | ICD-10-CM

## 2024-11-22 RX ORDER — HYDROXYZINE HYDROCHLORIDE 50 MG/1
50 TABLET, FILM COATED ORAL 2 TIMES DAILY PRN
Qty: 30 TABLET | Refills: 3 | Status: SHIPPED | OUTPATIENT
Start: 2024-11-22

## 2024-11-22 NOTE — TELEPHONE ENCOUNTER
Caller: Dorothy Zavala    Relationship: Self    Best call back number: [phone]     What medication are you requesting: SOMETHING FOR SYMPTOMS     What are your current symptoms: ANXIETY    How long have you been experiencing symptoms: 11/21/24     Have you had these symptoms before:    [] Yes  [x] No    Have you been treated for these symptoms before:   [] Yes  [x] No    If a prescription is needed, what is your preferred pharmacy and phone number: [id number] - [place], [place] - [address] - [phone]  - [phone] FX     Additional notes: PATIENT STATES SHE JUST RECEIVED NEWS THAT HER BROTHER HAS BEEN DIAGNOSED WITH CANCER AND SHE WAS BEEN VERY ANXIOUS AND NERVOUS. SHE WOULD LIKE A MEDICATION TO HELP HER.

## 2024-12-03 ENCOUNTER — TELEPHONE (OUTPATIENT)
Dept: FAMILY MEDICINE CLINIC | Facility: CLINIC | Age: 79
End: 2024-12-03

## 2024-12-03 NOTE — TELEPHONE ENCOUNTER
Caller: Sally Dorothy    Relationship: Self    Best call back number: [phone]     What is the best time to reach you: ANYTIME     Who are you requesting to speak with (clinical staff, provider,  specific staff member): CLINICAL STAFF    What was the call regarding: PATIENT IS CALLING IN REGARDS TO HER PRESCRIPTION FOR HYDROXYZINE. SHE SAYS THAT THE MEDICATION IS NOT HELPING HER AND IT IS CAUSING HER TO HAVE CRAZY DREAMS. SHE WOULD LIKE TO SEE ABOUT GETTING ANOTHER MEDICATION CALLED IN FOR ANXIETY    Is it okay if the provider responds through MyChart: YES

## 2024-12-09 ENCOUNTER — OFFICE VISIT (OUTPATIENT)
Dept: FAMILY MEDICINE CLINIC | Facility: CLINIC | Age: 79
End: 2024-12-09
Payer: MEDICARE

## 2024-12-09 VITALS
SYSTOLIC BLOOD PRESSURE: 130 MMHG | HEIGHT: 66 IN | HEART RATE: 78 BPM | RESPIRATION RATE: 20 BRPM | BODY MASS INDEX: 33.62 KG/M2 | TEMPERATURE: 97.8 F | OXYGEN SATURATION: 97 % | WEIGHT: 209.2 LBS | DIASTOLIC BLOOD PRESSURE: 70 MMHG

## 2024-12-09 DIAGNOSIS — F43.22 ADJUSTMENT DISORDER WITH ANXIOUS MOOD: Primary | ICD-10-CM

## 2024-12-09 DIAGNOSIS — Z23 NEED FOR IMMUNIZATION AGAINST INFLUENZA: ICD-10-CM

## 2024-12-09 DIAGNOSIS — Z12.31 ENCOUNTER FOR SCREENING MAMMOGRAM FOR MALIGNANT NEOPLASM OF BREAST: ICD-10-CM

## 2024-12-09 PROCEDURE — 3078F DIAST BP <80 MM HG: CPT | Performed by: FAMILY MEDICINE

## 2024-12-09 PROCEDURE — 1125F AMNT PAIN NOTED PAIN PRSNT: CPT | Performed by: FAMILY MEDICINE

## 2024-12-09 PROCEDURE — 90662 IIV NO PRSV INCREASED AG IM: CPT | Performed by: FAMILY MEDICINE

## 2024-12-09 PROCEDURE — 99213 OFFICE O/P EST LOW 20 MIN: CPT | Performed by: FAMILY MEDICINE

## 2024-12-09 PROCEDURE — 3075F SYST BP GE 130 - 139MM HG: CPT | Performed by: FAMILY MEDICINE

## 2024-12-09 PROCEDURE — G0008 ADMIN INFLUENZA VIRUS VAC: HCPCS | Performed by: FAMILY MEDICINE

## 2024-12-09 RX ORDER — BUSPIRONE HYDROCHLORIDE 5 MG/1
5 TABLET ORAL 2 TIMES DAILY
Qty: 60 TABLET | Refills: 0 | Status: SHIPPED | OUTPATIENT
Start: 2024-12-09 | End: 2024-12-16 | Stop reason: SDUPTHER

## 2024-12-09 NOTE — PROGRESS NOTES
"Chief Complaint   Patient presents with    Follow-up    Anxiety     Per phone message, \"hydroxyzine not working.\"        Subjective      Dorothy Zavala is a 79 y.o. who presents for stress around the health of her brother who has been diagnosed with stomach cancer and will be undergoing surgery in 48 hours.  Patient has been excessively nervous since his diagnosis.  There is a strong family history of various cancers within her family.  Patient had contacted the office and was prescribed hydroxyzine which was ineffective.  When her mother had cancer many years ago patient recalls being prescribed either diazepam or alprazolam.  She herself has been diagnosed with a basal cell skin cancer and a melanoma for which there will be surgery in January.  Patient inquires about assistance with calming her nerves    Objective   Vital Signs:  /70   Pulse 78   Temp 97.8 °F (36.6 °C)   Resp 20   Ht 167.6 cm (66\")   Wt 94.9 kg (209 lb 3.2 oz)   SpO2 97%   BMI 33.77 kg/m²     Physical Exam  Vitals reviewed.   Constitutional:       Appearance: Normal appearance.   Neurological:      Mental Status: She is alert.   Psychiatric:         Behavior: Behavior normal.         Thought Content: Thought content normal.         Judgment: Judgment normal.      Comments: Mildly anxious          Result Review                     Assessment and Plan  Diagnoses and all orders for this visit:    1. Adjustment disorder with anxious mood (Primary)  -     busPIRone (BUSPAR) 5 MG tablet; Take 1 tablet by mouth 2 (Two) Times a Day.  Dispense: 60 tablet; Refill: 0    2. Need for immunization against influenza  -     Fluzone High-Dose 65+yrs ([id number])    3. Encounter for screening mammogram for malignant neoplasm of breast  -     Mammo Screening Digital Tomosynthesis Bilateral With CAD; Future            Follow Up  No follow-ups on file.  Patient was given instructions and counseling regarding her condition or for health maintenance advice. " Please see specific information pulled into the AVS if appropriate.

## 2024-12-16 ENCOUNTER — TELEPHONE (OUTPATIENT)
Dept: FAMILY MEDICINE CLINIC | Facility: CLINIC | Age: 79
End: 2024-12-16
Payer: MEDICARE

## 2024-12-16 DIAGNOSIS — F43.22 ADJUSTMENT DISORDER WITH ANXIOUS MOOD: ICD-10-CM

## 2024-12-16 RX ORDER — BUSPIRONE HYDROCHLORIDE 10 MG/1
10 TABLET ORAL 2 TIMES DAILY
Qty: 60 TABLET | Refills: 0 | Status: SHIPPED | OUTPATIENT
Start: 2024-12-16

## 2024-12-16 NOTE — TELEPHONE ENCOUNTER
PATIENT HAS CALLED REQUESTING IF PCP CAN INCREASE HER DOSAGE ON busPIRone (BUSPAR) 5 MG tablet PATIENT IS REQUESTING A CALL BACK EITHER WAY TO LET HER KNOW IF DOSAGE CAN BE INCREASED.    CALL BACK NUMBER [phone]

## 2024-12-16 NOTE — TELEPHONE ENCOUNTER
Yes.  Patient may take 2 tablets at a time and I will send in a 10 mg tablet for her to begin taking

## 2025-01-09 DIAGNOSIS — E03.9 HYPOTHYROIDISM, UNSPECIFIED TYPE: Chronic | ICD-10-CM

## 2025-01-09 RX ORDER — LEVOTHYROXINE SODIUM 88 MCG
88 TABLET ORAL DAILY
Qty: 90 TABLET | Refills: 1 | Status: SHIPPED | OUTPATIENT
Start: 2025-01-09

## 2025-01-10 DIAGNOSIS — F43.22 ADJUSTMENT DISORDER WITH ANXIOUS MOOD: ICD-10-CM

## 2025-01-10 RX ORDER — BUSPIRONE HYDROCHLORIDE 10 MG/1
10 TABLET ORAL 2 TIMES DAILY
Qty: 60 TABLET | Refills: 0 | Status: SHIPPED | OUTPATIENT
Start: 2025-01-10

## 2025-01-27 ENCOUNTER — OFFICE VISIT (OUTPATIENT)
Dept: FAMILY MEDICINE CLINIC | Facility: CLINIC | Age: 80
End: 2025-01-27
Payer: MEDICARE

## 2025-01-27 VITALS
OXYGEN SATURATION: 98 % | HEIGHT: 66 IN | TEMPERATURE: 97.3 F | WEIGHT: 210 LBS | DIASTOLIC BLOOD PRESSURE: 75 MMHG | BODY MASS INDEX: 33.75 KG/M2 | SYSTOLIC BLOOD PRESSURE: 140 MMHG | RESPIRATION RATE: 20 BRPM | HEART RATE: 72 BPM

## 2025-01-27 DIAGNOSIS — E03.9 HYPOTHYROIDISM, UNSPECIFIED TYPE: Chronic | ICD-10-CM

## 2025-01-27 DIAGNOSIS — F51.01 PRIMARY INSOMNIA: ICD-10-CM

## 2025-01-27 DIAGNOSIS — I10 PRIMARY HYPERTENSION: Primary | Chronic | ICD-10-CM

## 2025-01-27 DIAGNOSIS — F43.22 ADJUSTMENT DISORDER WITH ANXIOUS MOOD: ICD-10-CM

## 2025-01-27 PROCEDURE — 1125F AMNT PAIN NOTED PAIN PRSNT: CPT | Performed by: FAMILY MEDICINE

## 2025-01-27 PROCEDURE — 3077F SYST BP >= 140 MM HG: CPT | Performed by: FAMILY MEDICINE

## 2025-01-27 PROCEDURE — 3078F DIAST BP <80 MM HG: CPT | Performed by: FAMILY MEDICINE

## 2025-01-27 PROCEDURE — G2211 COMPLEX E/M VISIT ADD ON: HCPCS | Performed by: FAMILY MEDICINE

## 2025-01-27 PROCEDURE — 99214 OFFICE O/P EST MOD 30 MIN: CPT | Performed by: FAMILY MEDICINE

## 2025-01-27 RX ORDER — ZOLPIDEM TARTRATE 12.5 MG/1
TABLET, FILM COATED, EXTENDED RELEASE ORAL
Qty: 90 TABLET | Refills: 1 | Status: SHIPPED | OUTPATIENT
Start: 2025-01-27

## 2025-01-27 RX ORDER — BUSPIRONE HYDROCHLORIDE 15 MG/1
15 TABLET ORAL 2 TIMES DAILY
Qty: 180 TABLET | Refills: 1 | Status: SHIPPED | OUTPATIENT
Start: 2025-01-27

## 2025-01-27 NOTE — PROGRESS NOTES
"Chief Complaint   Patient presents with    Follow-up    Hypothyroidism    Anxiety     Wants to discuss increasing Buspar dose slightly     Insomnia    chronic low back pain       Subjective      Dorothy Zavala is a 79 y.o. who presents for chronic care.    Hypertension.  Not monitored at home but takes medicine as prescribed.    Hypothyroidism.  Patient continues to take levothyroxine as prescribed.  She is due for surveillance TSH.    Mental health (anxiety).  Patient noted improvement in day today level of nervousness and feelings of anxiety with initiation of buspirone.  She has benefited so much inquires about a potential higher dose to help alleviate symptoms even more.  She remains on sertraline 100 mg.    Chronic low back pain.  Condition is stable with use of Tylenol and NSAIDs    The following portions of the patient's history were reviewed and updated as appropriate: allergies, current medications, past family history, past medical history, past social history, past surgical history, and problem list.    Review of Systems    Objective   Vital Signs:  /75   Pulse 72   Temp 97.3 °F (36.3 °C)   Resp 20   Ht 167.6 cm (66\")   Wt 95.3 kg (210 lb)   SpO2 98%   BMI 33.89 kg/m²               Physical Exam  Constitutional:       Appearance: Normal appearance.   HENT:      Head: Normocephalic and atraumatic.      Right Ear: Tympanic membrane and ear canal normal.      Left Ear: Tympanic membrane and ear canal normal.      Nose: Nose normal.      Mouth/Throat:      Mouth: Mucous membranes are moist.      Pharynx: Oropharynx is clear.   Eyes:      Conjunctiva/sclera: Conjunctivae normal.   Cardiovascular:      Rate and Rhythm: Normal rate and regular rhythm.      Heart sounds: Normal heart sounds. No murmur heard.  Pulmonary:      Effort: Pulmonary effort is normal. No respiratory distress.      Breath sounds: Normal breath sounds.   Musculoskeletal:      Cervical back: Normal range of motion and neck supple. " No tenderness.   Lymphadenopathy:      Cervical: No cervical adenopathy.   Skin:     General: Skin is warm and dry.   Neurological:      Mental Status: She is alert.   Psychiatric:         Mood and Affect: Mood normal.          Result Review                     Assessment and Plan  Diagnoses and all orders for this visit:    1. Primary hypertension (Primary)  Assessment & Plan:  Hypertension is stable and controlled  Continue current treatment regimen.  Blood pressure will be reassessed in 6 months.      2. Hypothyroidism, unspecified type  Assessment & Plan:  Condition is stable.  Surveillance TSH ordered.  Continue levothyroxine    Orders:  -     TSH    3. Primary insomnia  Comments:  Stable.  Continue Ambien CR  Orders:  -     zolpidem CR (AMBIEN CR) 12.5 MG CR tablet; Take 1 table by mouth nightly for lseep  Dispense: 90 tablet; Refill: 1    4. Adjustment disorder with anxious mood  Comments:  Improving.  Trial of increase buspirone to 15 mg twice daily  Orders:  -     busPIRone (BUSPAR) 15 MG tablet; Take 1 tablet by mouth 2 (Two) Times a Day.  Dispense: 180 tablet; Refill: 1            Follow Up  No follow-ups on file.  Patient was given instructions and counseling regarding her condition or for health maintenance advice. Please see specific information pulled into the AVS if appropriate.

## 2025-01-28 LAB — TSH SERPL DL<=0.005 MIU/L-ACNC: 1.12 UIU/ML (ref 0.45–4.5)

## 2025-01-30 DIAGNOSIS — F43.22 ADJUSTMENT DISORDER WITH ANXIOUS MOOD: ICD-10-CM

## 2025-01-30 RX ORDER — BUSPIRONE HYDROCHLORIDE 10 MG/1
10 TABLET ORAL 2 TIMES DAILY
Qty: 60 TABLET | Refills: 0 | OUTPATIENT
Start: 2025-01-30

## 2025-02-05 DIAGNOSIS — F41.9 ANXIETY AND DEPRESSION: Chronic | ICD-10-CM

## 2025-02-05 DIAGNOSIS — F51.01 PRIMARY INSOMNIA: ICD-10-CM

## 2025-02-05 DIAGNOSIS — F32.A ANXIETY AND DEPRESSION: Chronic | ICD-10-CM

## 2025-02-05 RX ORDER — HYDROXYZINE HYDROCHLORIDE 50 MG/1
TABLET, FILM COATED ORAL
Qty: 30 TABLET | Refills: 3 | Status: SHIPPED | OUTPATIENT
Start: 2025-02-05

## 2025-02-05 RX ORDER — ZOLPIDEM TARTRATE 12.5 MG/1
TABLET, FILM COATED, EXTENDED RELEASE ORAL
Qty: 90 TABLET | Refills: 1 | Status: SHIPPED | OUTPATIENT
Start: 2025-02-05

## 2025-02-05 NOTE — TELEPHONE ENCOUNTER
Rx Refill Note  Requested Prescriptions     Pending Prescriptions Disp Refills    zolpidem CR (AMBIEN CR) 12.5 MG CR tablet [Pharmacy Med Name: ZOLPIDEM TARTRATE ER 12.5MG TBCR] 90 tablet 1     Sig: TAKE 1 TABLET BY MOUTH EVERY NIGHT FOR SLEEP    hydrOXYzine (ATARAX) 50 MG tablet [Pharmacy Med Name: HYDROXYZINE HCL 50MG TABS] 30 tablet 3     Sig: TAKE 1 TABLET BY MOUTH 2 TIMES A DAY AS NEEDED FOR ANXIETY. APPOINTMENT NEEDED FOR FURTHER REFILLS.      Last office visit with prescribing clinician: 1/27/2025   Last telemedicine visit with prescribing clinician: Visit date not found   Next office visit with prescribing clinician: 7/28/2025                         Would you like a call back once the refill request has been completed: [] Yes [] No    If the office needs to give you a call back, can they leave a voicemail: [] Yes [] No    Tonia Malik MA  02/05/25, 12:05 EST

## 2025-03-02 LAB
NCCN CRITERIA FLAG: NORMAL
TYRER CUZICK SCORE: 2.1

## 2025-03-29 DIAGNOSIS — F33.0 MILD EPISODE OF RECURRENT MAJOR DEPRESSIVE DISORDER: ICD-10-CM

## 2025-03-31 RX ORDER — SERTRALINE HYDROCHLORIDE 100 MG/1
100 TABLET, FILM COATED ORAL DAILY
Qty: 90 TABLET | Refills: 2 | Status: SHIPPED | OUTPATIENT
Start: 2025-03-31

## 2025-04-09 ENCOUNTER — HOSPITAL ENCOUNTER (OUTPATIENT)
Dept: MAMMOGRAPHY | Facility: HOSPITAL | Age: 80
Discharge: HOME | End: 2025-04-09
Admitting: FAMILY MEDICINE
Payer: MEDICARE

## 2025-04-09 DIAGNOSIS — Z12.31 ENCOUNTER FOR SCREENING MAMMOGRAM FOR MALIGNANT NEOPLASM OF BREAST: ICD-10-CM

## 2025-04-09 PROCEDURE — 77063 BREAST TOMOSYNTHESIS BI: CPT

## 2025-04-09 PROCEDURE — 77067 SCR MAMMO BI INCL CAD: CPT

## 2025-05-20 ENCOUNTER — OFFICE VISIT (OUTPATIENT)
Dept: FAMILY MEDICINE CLINIC | Facility: CLINIC | Age: 80
End: 2025-05-20
Payer: MEDICARE

## 2025-05-20 VITALS
TEMPERATURE: 97.5 F | SYSTOLIC BLOOD PRESSURE: 162 MMHG | HEART RATE: 72 BPM | OXYGEN SATURATION: 95 % | BODY MASS INDEX: 34.58 KG/M2 | RESPIRATION RATE: 18 BRPM | HEIGHT: 66 IN | WEIGHT: 215.2 LBS | DIASTOLIC BLOOD PRESSURE: 74 MMHG

## 2025-05-20 DIAGNOSIS — J01.90 SUBACUTE SINUSITIS, UNSPECIFIED LOCATION: Primary | ICD-10-CM

## 2025-05-20 DIAGNOSIS — H69.93 ETD (EUSTACHIAN TUBE DYSFUNCTION), BILATERAL: ICD-10-CM

## 2025-05-20 PROCEDURE — 3078F DIAST BP <80 MM HG: CPT | Performed by: FAMILY MEDICINE

## 2025-05-20 PROCEDURE — 1125F AMNT PAIN NOTED PAIN PRSNT: CPT | Performed by: FAMILY MEDICINE

## 2025-05-20 PROCEDURE — 99213 OFFICE O/P EST LOW 20 MIN: CPT | Performed by: FAMILY MEDICINE

## 2025-05-20 PROCEDURE — 3077F SYST BP >= 140 MM HG: CPT | Performed by: FAMILY MEDICINE

## 2025-05-20 RX ORDER — FLUTICASONE PROPIONATE 50 MCG
2 SPRAY, SUSPENSION (ML) NASAL DAILY
Qty: 16 G | Refills: 5 | Status: SHIPPED | OUTPATIENT
Start: 2025-05-20

## 2025-05-20 RX ORDER — CEFDINIR 300 MG/1
300 CAPSULE ORAL 2 TIMES DAILY
Qty: 14 CAPSULE | Refills: 0 | Status: SHIPPED | OUTPATIENT
Start: 2025-05-20

## 2025-05-20 RX ORDER — METHYLPREDNISOLONE 4 MG/1
TABLET ORAL
Qty: 21 TABLET | Refills: 0 | Status: SHIPPED | OUTPATIENT
Start: 2025-05-20

## 2025-05-20 NOTE — PROGRESS NOTES
"Chief Complaint   Patient presents with    Referall     Needing referral for hearing, not sure which type she needs.        Subjective      Dorothy Zavala is a 79 y.o. who presents for decrease in hearing and feeling as if \"her head is in a bubble since having \"sinuses\" last month.  She is having difficulty hearing when speaking with other people.  She has some ear pain on the right.  She denies nasal discharge or allergy symptoms    Objective   Vital Signs:  /74   Pulse 72   Temp 97.5 °F (36.4 °C)   Resp 18   Ht 167.6 cm (66\")   Wt 97.6 kg (215 lb 3.2 oz)   SpO2 95%   BMI 34.73 kg/m²     Physical Exam  Vitals reviewed.   Constitutional:       Appearance: Normal appearance.   HENT:      Head: Normocephalic and atraumatic.      Right Ear: Ear canal normal.      Left Ear: Ear canal normal.      Ears:      Comments: Tympanic membranes are retracted bilaterally, right more than left     Nose: Nose normal.      Mouth/Throat:      Mouth: Mucous membranes are moist.      Pharynx: Oropharynx is clear.   Eyes:      Conjunctiva/sclera: Conjunctivae normal.   Cardiovascular:      Rate and Rhythm: Normal rate and regular rhythm.      Heart sounds: Normal heart sounds. No murmur heard.  Pulmonary:      Effort: Pulmonary effort is normal. No respiratory distress.      Breath sounds: Normal breath sounds.   Musculoskeletal:      Cervical back: Normal range of motion and neck supple. No tenderness.   Lymphadenopathy:      Cervical: No cervical adenopathy.   Skin:     General: Skin is warm and dry.   Neurological:      Mental Status: She is alert.   Psychiatric:         Mood and Affect: Mood normal.          Result Review                     Assessment and Plan  Diagnoses and all orders for this visit:    1. Subacute sinusitis, unspecified location (Primary)  -     cefdinir (OMNICEF) 300 MG capsule; Take 1 capsule by mouth 2 (Two) Times a Day.  Dispense: 14 capsule; Refill: 0  -     methylPREDNISolone (MEDROL) 4 MG " dose pack; Take as directed on package instructions.  Dispense: 21 tablet; Refill: 0    2. ETD (Eustachian tube dysfunction), bilateral  -     fluticasone (FLONASE) 50 MCG/ACT nasal spray; Administer 2 sprays into the nostril(s) as directed by provider Daily.  Dispense: 16 g; Refill: 5    MDM: Acute problem requiring prescription medications.  Use oral and intranasal steroids as well as antibiotic for subacute sinusitis and eustachian tube dysfunction.  Return in 1 week for reexamination of the ears        Follow Up  Return in 1 week (on 5/27/2025) for Follow up in 1 week for re-examination of ears.  Patient was given instructions and counseling regarding her condition or for health maintenance advice. Please see specific information pulled into the AVS if appropriate.

## 2025-05-27 ENCOUNTER — OFFICE VISIT (OUTPATIENT)
Dept: FAMILY MEDICINE CLINIC | Facility: CLINIC | Age: 80
End: 2025-05-27
Payer: MEDICARE

## 2025-05-27 VITALS
HEART RATE: 81 BPM | RESPIRATION RATE: 18 BRPM | TEMPERATURE: 96.4 F | HEIGHT: 66 IN | WEIGHT: 213 LBS | BODY MASS INDEX: 34.23 KG/M2 | DIASTOLIC BLOOD PRESSURE: 86 MMHG | OXYGEN SATURATION: 96 % | SYSTOLIC BLOOD PRESSURE: 132 MMHG

## 2025-05-27 DIAGNOSIS — H69.93 ETD (EUSTACHIAN TUBE DYSFUNCTION), BILATERAL: Primary | ICD-10-CM

## 2025-05-27 PROCEDURE — 3079F DIAST BP 80-89 MM HG: CPT | Performed by: FAMILY MEDICINE

## 2025-05-27 PROCEDURE — 1125F AMNT PAIN NOTED PAIN PRSNT: CPT | Performed by: FAMILY MEDICINE

## 2025-05-27 PROCEDURE — 3075F SYST BP GE 130 - 139MM HG: CPT | Performed by: FAMILY MEDICINE

## 2025-05-27 PROCEDURE — 99213 OFFICE O/P EST LOW 20 MIN: CPT | Performed by: FAMILY MEDICINE

## 2025-05-27 NOTE — PROGRESS NOTES
"Chief Complaint   Patient presents with    Sinus Problem     F/up          Subjective      Dorothy Zavala is a 79 y.o. who presents for returns of ETD and subacute sinusitis. Post nasal drainiage and nasal discharege have improved. Hearing is still impaired and ears \"feeel stopped up\"    Objective   Vital Signs:  /86   Pulse 81   Temp 96.4 °F (35.8 °C)   Resp 18   Ht 167.6 cm (66\")   Wt 96.6 kg (213 lb)   SpO2 96%   BMI 34.38 kg/m²     Physical Exam  Vitals reviewed.   Constitutional:       Appearance: Normal appearance.   HENT:      Ears:      Comments: Retraction of Tms bilaterally  Neurological:      Mental Status: She is alert.          Result Review                     Assessment and Plan  Diagnoses and all orders for this visit:    1. ETD (Eustachian tube dysfunction), bilateral (Primary)  Comments:  Refer to ENT. cont. flonase for now  Orders:  -     Ambulatory Referral to ENT (Otolaryngology)            Follow Up  No follow-ups on file.  Patient was given instructions and counseling regarding her condition or for health maintenance advice. Please see specific information pulled into the AVS if appropriate.       "

## 2025-06-23 ENCOUNTER — TELEPHONE (OUTPATIENT)
Dept: FAMILY MEDICINE CLINIC | Facility: CLINIC | Age: 80
End: 2025-06-23
Payer: MEDICARE

## 2025-06-23 NOTE — TELEPHONE ENCOUNTER
Caller: Dorothy Zavala    Relationship: Self    Best call back number:   Telephone Information:   Mobile [phone]       What medication are you requesting: ANTIBIOTIC    What are your current symptoms: COUGH, WHEEZING RELATED TO WEATHER CHANGES    How long have you been experiencing symptoms: WHEN THE TEMPERATURE STARTED GOING UP    Have you had these symptoms before:    [x] Yes  [] No    Have you been treated for these symptoms before:   [x] Yes  [] No    If a prescription is needed, what is your preferred pharmacy and phone number:  [facility]/pharmacy #[id number] - [place], [place] - [address] [place] [address] AT [facility] [address] - [phone]  - [phone] FX     Additional notes: PATIENT STATES SHE HAS BEEN USING THE NASAL DROPS THAT WERE PRESCRIBED, BUT THE COUGH AND WHEEZING ARE NOT GOING AWAY. PATIENT IS HOPING TO GET AN ANTIBIOTIC TO HELP CLEAR HER SYMPTOMS.

## 2025-06-25 DIAGNOSIS — M54.50 CHRONIC MIDLINE LOW BACK PAIN, UNSPECIFIED WHETHER SCIATICA PRESENT: ICD-10-CM

## 2025-06-25 DIAGNOSIS — G89.29 CHRONIC MIDLINE LOW BACK PAIN, UNSPECIFIED WHETHER SCIATICA PRESENT: ICD-10-CM

## 2025-06-25 DIAGNOSIS — F43.22 ADJUSTMENT DISORDER WITH ANXIOUS MOOD: ICD-10-CM

## 2025-06-25 RX ORDER — MELOXICAM 7.5 MG/1
7.5 TABLET ORAL DAILY
Qty: 90 TABLET | Refills: 3 | Status: SHIPPED | OUTPATIENT
Start: 2025-06-25

## 2025-06-25 RX ORDER — BUSPIRONE HYDROCHLORIDE 15 MG/1
15 TABLET ORAL 2 TIMES DAILY
Qty: 180 TABLET | Refills: 1 | Status: SHIPPED | OUTPATIENT
Start: 2025-06-25

## 2025-06-25 NOTE — TELEPHONE ENCOUNTER
Caller: Dorothy Zavala    Relationship: Self    Best call back number: [phone]     Requested Prescriptions:   Requested Prescriptions     Pending Prescriptions Disp Refills    busPIRone (BUSPAR) 15 MG tablet 180 tablet 1     Sig: Take 1 tablet by mouth 2 (Two) Times a Day.    meloxicam (MOBIC) 7.5 MG tablet 90 tablet 3     Sig: Take 1 tablet by mouth Daily.        Pharmacy where request should be sent: [address] [phone] [place] [phone] FX     Last office visit with prescribing clinician: 5/27/2025   Last telemedicine visit with prescribing clinician: Visit date not found   Next office visit with prescribing clinician: 8/4/2025     Additional details provided by patient:     Does the patient have less than a 3 day supply:  [] Yes  [x] No    Would you like a call back once the refill request has been completed: [x] Yes [] No    If the office needs to give you a call back, can they leave a voicemail: [x] Yes [] No    Esteban Lopez Rep   06/25/25 14:27 EDT

## 2025-06-30 ENCOUNTER — OFFICE VISIT (OUTPATIENT)
Dept: FAMILY MEDICINE CLINIC | Facility: CLINIC | Age: 80
End: 2025-06-30
Payer: MEDICARE

## 2025-06-30 VITALS
OXYGEN SATURATION: 95 % | DIASTOLIC BLOOD PRESSURE: 78 MMHG | SYSTOLIC BLOOD PRESSURE: 122 MMHG | HEIGHT: 66 IN | BODY MASS INDEX: 34.39 KG/M2 | TEMPERATURE: 97.8 F | HEART RATE: 90 BPM | WEIGHT: 214 LBS | RESPIRATION RATE: 18 BRPM

## 2025-06-30 DIAGNOSIS — R06.2 WHEEZE: Primary | ICD-10-CM

## 2025-06-30 PROCEDURE — 3078F DIAST BP <80 MM HG: CPT | Performed by: FAMILY MEDICINE

## 2025-06-30 PROCEDURE — 99213 OFFICE O/P EST LOW 20 MIN: CPT | Performed by: FAMILY MEDICINE

## 2025-06-30 PROCEDURE — 3074F SYST BP LT 130 MM HG: CPT | Performed by: FAMILY MEDICINE

## 2025-06-30 PROCEDURE — 1125F AMNT PAIN NOTED PAIN PRSNT: CPT | Performed by: FAMILY MEDICINE

## 2025-06-30 PROCEDURE — G2211 COMPLEX E/M VISIT ADD ON: HCPCS | Performed by: FAMILY MEDICINE

## 2025-06-30 RX ORDER — PREDNISONE 20 MG/1
40 TABLET ORAL DAILY
Qty: 10 TABLET | Refills: 0 | Status: SHIPPED | OUTPATIENT
Start: 2025-06-30

## 2025-06-30 NOTE — TELEPHONE ENCOUNTER
Name: Sally Dorothy Xiomara      Relationship: Self      Best Callback Number: [phone]       HUB PROVIDED THE RELAY MESSAGE FROM THE OFFICE      PATIENT: SCHEDULED PER NOTE    ADDITIONAL INFORMATION:

## 2025-06-30 NOTE — PROGRESS NOTES
Subjective   Dorothy Zavala is a 80 y.o. female.     History of Present Illness     She has had wheezing and congestion the past week  No SOA noted  No fevers  Mild cough, minor congestion  Just feels tighter breathing with wheeze    The following portions of the patient's history were reviewed and updated as appropriate: allergies, current medications, past family history, past medical history, past social history, past surgical history, and problem list.    Review of Systems   Constitutional:  Negative for fever.   Respiratory:  Positive for wheezing. Negative for shortness of breath.        Objective   Physical Exam  Vitals and nursing note reviewed.   Constitutional:       Appearance: She is well-developed.   HENT:      Head: Normocephalic and atraumatic.      Right Ear: Hearing, tympanic membrane, ear canal and external ear normal.      Left Ear: Hearing, tympanic membrane, ear canal and external ear normal.      Nose: Nose normal.      Mouth/Throat:      Pharynx: Uvula midline.   Eyes:      Conjunctiva/sclera: Conjunctivae normal.   Cardiovascular:      Rate and Rhythm: Normal rate and regular rhythm.      Heart sounds: Normal heart sounds.   Pulmonary:      Effort: Pulmonary effort is normal.      Breath sounds: Normal breath sounds. No wheezing or rhonchi.   Musculoskeletal:      Cervical back: Normal range of motion.   Lymphadenopathy:      Cervical: No cervical adenopathy.   Psychiatric:         Behavior: Behavior normal.         Assessment & Plan   Diagnoses and all orders for this visit:    1. Wheeze (Primary)  -     predniSONE (DELTASONE) 20 MG tablet; Take 2 tablets by mouth Daily.  Dispense: 10 tablet; Refill: 0    Treat with pred burst, pt to call INB.  No indication for imaging nor Abx noted at today's visit

## 2025-07-07 DIAGNOSIS — F41.9 ANXIETY AND DEPRESSION: Chronic | ICD-10-CM

## 2025-07-07 DIAGNOSIS — F32.A ANXIETY AND DEPRESSION: Chronic | ICD-10-CM

## 2025-07-07 RX ORDER — HYDROXYZINE HYDROCHLORIDE 50 MG/1
50 TABLET, FILM COATED ORAL 2 TIMES DAILY PRN
Qty: 30 TABLET | Refills: 2 | Status: SHIPPED | OUTPATIENT
Start: 2025-07-07

## 2025-07-07 NOTE — TELEPHONE ENCOUNTER
Caller: Dorothy Zavala    Relationship: Self    Best call back number: [phone]     Requested Prescriptions:   Requested Prescriptions     Pending Prescriptions Disp Refills    hydrOXYzine (ATARAX) 50 MG tablet 30 tablet 3        Pharmacy where request should be sent: [facility], [address] [phone] [facility] [phone]      Last office visit with prescribing clinician: 5/27/2025   Last telemedicine visit with prescribing clinician: Visit date not found   Next office visit with prescribing clinician: 8/4/2025     Additional details provided by patient: 5 DAYS OF MEDICATION ON HAND     Does the patient have less than a 3 day supply:  [] Yes  [x] No    Would you like a call back once the refill request has been completed: [] Yes [] No    If the office needs to give you a call back, can they leave a voicemail: [] Yes [] No    Esteban Nuñez   07/07/25 13:36 EDT

## 2025-08-04 ENCOUNTER — OFFICE VISIT (OUTPATIENT)
Dept: FAMILY MEDICINE CLINIC | Facility: CLINIC | Age: 80
End: 2025-08-04
Payer: MEDICARE

## 2025-08-04 VITALS
BODY MASS INDEX: 34.87 KG/M2 | HEIGHT: 66 IN | SYSTOLIC BLOOD PRESSURE: 134 MMHG | OXYGEN SATURATION: 97 % | DIASTOLIC BLOOD PRESSURE: 64 MMHG | TEMPERATURE: 98 F | HEART RATE: 87 BPM | WEIGHT: 217 LBS

## 2025-08-04 DIAGNOSIS — E03.9 HYPOTHYROIDISM, UNSPECIFIED TYPE: Chronic | ICD-10-CM

## 2025-08-04 DIAGNOSIS — M54.50 CHRONIC MIDLINE LOW BACK PAIN, UNSPECIFIED WHETHER SCIATICA PRESENT: ICD-10-CM

## 2025-08-04 DIAGNOSIS — E78.00 HYPERCHOLESTEROLEMIA: ICD-10-CM

## 2025-08-04 DIAGNOSIS — Z00.00 ANNUAL PHYSICAL EXAM: ICD-10-CM

## 2025-08-04 DIAGNOSIS — F51.01 PRIMARY INSOMNIA: ICD-10-CM

## 2025-08-04 DIAGNOSIS — Z00.00 MEDICARE ANNUAL WELLNESS VISIT, SUBSEQUENT: Primary | ICD-10-CM

## 2025-08-04 DIAGNOSIS — I10 PRIMARY HYPERTENSION: Chronic | ICD-10-CM

## 2025-08-04 DIAGNOSIS — G89.29 CHRONIC MIDLINE LOW BACK PAIN, UNSPECIFIED WHETHER SCIATICA PRESENT: ICD-10-CM

## 2025-08-04 DIAGNOSIS — I10 ESSENTIAL HYPERTENSION: ICD-10-CM

## 2025-08-04 PROCEDURE — 1160F RVW MEDS BY RX/DR IN RCRD: CPT | Performed by: FAMILY MEDICINE

## 2025-08-04 PROCEDURE — G0439 PPPS, SUBSEQ VISIT: HCPCS | Performed by: FAMILY MEDICINE

## 2025-08-04 PROCEDURE — 3078F DIAST BP <80 MM HG: CPT | Performed by: FAMILY MEDICINE

## 2025-08-04 PROCEDURE — 3075F SYST BP GE 130 - 139MM HG: CPT | Performed by: FAMILY MEDICINE

## 2025-08-04 PROCEDURE — 1170F FXNL STATUS ASSESSED: CPT | Performed by: FAMILY MEDICINE

## 2025-08-04 PROCEDURE — 1126F AMNT PAIN NOTED NONE PRSNT: CPT | Performed by: FAMILY MEDICINE

## 2025-08-04 PROCEDURE — 99397 PER PM REEVAL EST PAT 65+ YR: CPT | Performed by: FAMILY MEDICINE

## 2025-08-04 PROCEDURE — 1159F MED LIST DOCD IN RCRD: CPT | Performed by: FAMILY MEDICINE

## 2025-08-04 RX ORDER — NIFEDIPINE 30 MG
30 TABLET, EXTENDED RELEASE ORAL
Qty: 90 TABLET | Refills: 3 | Status: SHIPPED | OUTPATIENT
Start: 2025-08-04

## 2025-08-04 RX ORDER — ZOLPIDEM TARTRATE 12.5 MG/1
TABLET, FILM COATED, EXTENDED RELEASE ORAL
Qty: 90 TABLET | Refills: 1 | Status: SHIPPED | OUTPATIENT
Start: 2025-08-04

## 2025-08-05 LAB
ALBUMIN SERPL-MCNC: 4.4 G/DL (ref 3.8–4.8)
ALP SERPL-CCNC: 98 IU/L (ref 44–121)
ALT SERPL-CCNC: 13 IU/L (ref 0–32)
AST SERPL-CCNC: 19 IU/L (ref 0–40)
BILIRUB SERPL-MCNC: 0.3 MG/DL (ref 0–1.2)
BUN SERPL-MCNC: 15 MG/DL (ref 8–27)
BUN/CREAT SERPL: 19 (ref 12–28)
CALCIUM SERPL-MCNC: 10.1 MG/DL (ref 8.7–10.3)
CHLORIDE SERPL-SCNC: 100 MMOL/L (ref 96–106)
CHOLEST SERPL-MCNC: 310 MG/DL (ref 100–199)
CO2 SERPL-SCNC: 24 MMOL/L (ref 20–29)
CREAT SERPL-MCNC: 0.81 MG/DL (ref 0.57–1)
EGFRCR SERPLBLD CKD-EPI 2021: 74 ML/MIN/1.73
GLOBULIN SER CALC-MCNC: 2.7 G/DL (ref 1.5–4.5)
GLUCOSE SERPL-MCNC: 96 MG/DL (ref 70–99)
HCT VFR BLD AUTO: 43.6 % (ref 34–46.6)
HDLC SERPL-MCNC: 75 MG/DL
HGB BLD-MCNC: 13.5 G/DL (ref 11.1–15.9)
LDL CALC COMMENT:: ABNORMAL
LDLC SERPL CALC-MCNC: 195 MG/DL (ref 0–99)
POTASSIUM SERPL-SCNC: 5 MMOL/L (ref 3.5–5.2)
PROT SERPL-MCNC: 7.1 G/DL (ref 6–8.5)
SODIUM SERPL-SCNC: 139 MMOL/L (ref 134–144)
T4 FREE SERPL-MCNC: 0.86 NG/DL (ref 0.82–1.77)
TRIGL SERPL-MCNC: 213 MG/DL (ref 0–149)
TSH SERPL DL<=0.005 MIU/L-ACNC: 7.22 UIU/ML (ref 0.45–4.5)
VLDLC SERPL CALC-MCNC: 40 MG/DL (ref 5–40)